# Patient Record
Sex: MALE | Race: WHITE | NOT HISPANIC OR LATINO | Employment: FULL TIME | URBAN - METROPOLITAN AREA
[De-identification: names, ages, dates, MRNs, and addresses within clinical notes are randomized per-mention and may not be internally consistent; named-entity substitution may affect disease eponyms.]

---

## 2017-01-20 ENCOUNTER — RECORDS - HEALTHEAST (OUTPATIENT)
Dept: LAB | Facility: CLINIC | Age: 62
End: 2017-01-20

## 2017-01-20 LAB
CHOLEST SERPL-MCNC: 112 MG/DL
FASTING STATUS PATIENT QL REPORTED: YES
HDLC SERPL-MCNC: 34 MG/DL
LDLC SERPL CALC-MCNC: 69 MG/DL
TRIGL SERPL-MCNC: 45 MG/DL

## 2017-03-30 ENCOUNTER — AMBULATORY - HEALTHEAST (OUTPATIENT)
Dept: CARDIOLOGY | Facility: CLINIC | Age: 62
End: 2017-03-30

## 2017-04-03 ENCOUNTER — OFFICE VISIT - HEALTHEAST (OUTPATIENT)
Dept: CARDIOLOGY | Facility: CLINIC | Age: 62
End: 2017-04-03

## 2017-04-03 DIAGNOSIS — I10 ESSENTIAL HYPERTENSION WITH GOAL BLOOD PRESSURE LESS THAN 130/80: ICD-10-CM

## 2017-04-03 ASSESSMENT — MIFFLIN-ST. JEOR: SCORE: 1765.56

## 2017-07-17 ENCOUNTER — RECORDS - HEALTHEAST (OUTPATIENT)
Dept: LAB | Facility: CLINIC | Age: 62
End: 2017-07-17

## 2017-07-17 LAB — PSA SERPL-MCNC: 0.7 NG/ML (ref 0–4.5)

## 2017-08-29 ENCOUNTER — AMBULATORY - HEALTHEAST (OUTPATIENT)
Dept: CARDIOLOGY | Facility: CLINIC | Age: 62
End: 2017-08-29

## 2017-08-29 ENCOUNTER — RECORDS - HEALTHEAST (OUTPATIENT)
Dept: ADMINISTRATIVE | Facility: OTHER | Age: 62
End: 2017-08-29

## 2017-08-30 ENCOUNTER — OFFICE VISIT - HEALTHEAST (OUTPATIENT)
Dept: CARDIOLOGY | Facility: CLINIC | Age: 62
End: 2017-08-30

## 2017-08-30 DIAGNOSIS — I48.0 PAF (PAROXYSMAL ATRIAL FIBRILLATION) (H): ICD-10-CM

## 2017-08-30 ASSESSMENT — MIFFLIN-ST. JEOR: SCORE: 1756.48

## 2017-08-31 LAB
ATRIAL RATE - MUSE: 46 BPM
DIASTOLIC BLOOD PRESSURE - MUSE: NORMAL MMHG
INTERPRETATION ECG - MUSE: NORMAL
P AXIS - MUSE: 33 DEGREES
PR INTERVAL - MUSE: 190 MS
QRS DURATION - MUSE: 114 MS
QT - MUSE: 496 MS
QTC - MUSE: 434 MS
R AXIS - MUSE: 48 DEGREES
SYSTOLIC BLOOD PRESSURE - MUSE: NORMAL MMHG
T AXIS - MUSE: 58 DEGREES
VENTRICULAR RATE- MUSE: 46 BPM

## 2017-09-01 ENCOUNTER — COMMUNICATION - HEALTHEAST (OUTPATIENT)
Dept: CARDIOLOGY | Facility: CLINIC | Age: 62
End: 2017-09-01

## 2017-09-01 DIAGNOSIS — I10 HTN (HYPERTENSION): ICD-10-CM

## 2017-09-01 RX ORDER — ALISKIREN HEMIFUMARATE 150 MG/1
TABLET, FILM COATED ORAL
Qty: 90 TABLET | Refills: 3 | Status: SHIPPED | OUTPATIENT
Start: 2017-09-01

## 2017-09-18 ENCOUNTER — HOSPITAL ENCOUNTER (OUTPATIENT)
Dept: CARDIOLOGY | Facility: CLINIC | Age: 62
Discharge: HOME | End: 2017-09-18
Attending: INTERNAL MEDICINE

## 2017-09-18 DIAGNOSIS — I48.0 PAF (PAROXYSMAL ATRIAL FIBRILLATION) (H): ICD-10-CM

## 2017-10-11 ENCOUNTER — COMMUNICATION - HEALTHEAST (OUTPATIENT)
Dept: CARDIOLOGY | Facility: CLINIC | Age: 62
End: 2017-10-11

## 2017-10-11 DIAGNOSIS — I48.0 PAF (PAROXYSMAL ATRIAL FIBRILLATION) (H): ICD-10-CM

## 2017-10-13 ENCOUNTER — OFFICE VISIT - HEALTHEAST (OUTPATIENT)
Dept: CARDIOLOGY | Facility: CLINIC | Age: 62
End: 2017-10-13

## 2017-10-13 DIAGNOSIS — I10 ESSENTIAL HYPERTENSION: ICD-10-CM

## 2017-10-13 DIAGNOSIS — I48.0 PAF (PAROXYSMAL ATRIAL FIBRILLATION) (H): ICD-10-CM

## 2017-10-13 DIAGNOSIS — I25.10 CORONARY ARTERY DISEASE: ICD-10-CM

## 2017-10-13 ASSESSMENT — MIFFLIN-ST. JEOR: SCORE: 1738.34

## 2018-01-29 ENCOUNTER — RECORDS - HEALTHEAST (OUTPATIENT)
Dept: LAB | Facility: CLINIC | Age: 63
End: 2018-01-29

## 2018-01-29 LAB
ANION GAP SERPL CALCULATED.3IONS-SCNC: 10 MMOL/L (ref 5–18)
BUN SERPL-MCNC: 17 MG/DL (ref 8–22)
CALCIUM SERPL-MCNC: 9.2 MG/DL (ref 8.5–10.5)
CHLORIDE BLD-SCNC: 104 MMOL/L (ref 98–107)
CHOLEST SERPL-MCNC: 160 MG/DL
CO2 SERPL-SCNC: 26 MMOL/L (ref 22–31)
CREAT SERPL-MCNC: 0.82 MG/DL (ref 0.7–1.3)
CREAT UR-MCNC: 64.4 MG/DL
FASTING STATUS PATIENT QL REPORTED: NORMAL
GFR SERPL CREATININE-BSD FRML MDRD: >60 ML/MIN/1.73M2
GLUCOSE BLD-MCNC: 128 MG/DL (ref 70–125)
HDLC SERPL-MCNC: 44 MG/DL
LDLC SERPL CALC-MCNC: 94 MG/DL
MICROALBUMIN UR-MCNC: <0.5 MG/DL (ref 0–1.99)
MICROALBUMIN/CREAT UR: NORMAL MG/G
POTASSIUM BLD-SCNC: 3.8 MMOL/L (ref 3.5–5)
SODIUM SERPL-SCNC: 140 MMOL/L (ref 136–145)
TRIGL SERPL-MCNC: 112 MG/DL

## 2018-07-23 ENCOUNTER — RECORDS - HEALTHEAST (OUTPATIENT)
Dept: LAB | Facility: CLINIC | Age: 63
End: 2018-07-23

## 2018-07-23 LAB
ALBUMIN SERPL-MCNC: 3.8 G/DL (ref 3.5–5)
ALP SERPL-CCNC: 99 U/L (ref 45–120)
ALT SERPL W P-5'-P-CCNC: 36 U/L (ref 0–45)
ANION GAP SERPL CALCULATED.3IONS-SCNC: 10 MMOL/L (ref 5–18)
AST SERPL W P-5'-P-CCNC: 21 U/L (ref 0–40)
BILIRUB SERPL-MCNC: 0.9 MG/DL (ref 0–1)
BUN SERPL-MCNC: 21 MG/DL (ref 8–22)
CALCIUM SERPL-MCNC: 9.1 MG/DL (ref 8.5–10.5)
CHLORIDE BLD-SCNC: 104 MMOL/L (ref 98–107)
CHOLEST SERPL-MCNC: 142 MG/DL
CO2 SERPL-SCNC: 25 MMOL/L (ref 22–31)
CREAT SERPL-MCNC: 0.85 MG/DL (ref 0.7–1.3)
FASTING STATUS PATIENT QL REPORTED: ABNORMAL
GFR SERPL CREATININE-BSD FRML MDRD: >60 ML/MIN/1.73M2
GLUCOSE BLD-MCNC: 140 MG/DL (ref 70–125)
HDLC SERPL-MCNC: 37 MG/DL
LDLC SERPL CALC-MCNC: 72 MG/DL
POTASSIUM BLD-SCNC: 3.8 MMOL/L (ref 3.5–5)
PROT SERPL-MCNC: 6.4 G/DL (ref 6–8)
PSA SERPL-MCNC: 0.6 NG/ML (ref 0–4.5)
SODIUM SERPL-SCNC: 139 MMOL/L (ref 136–145)
TRIGL SERPL-MCNC: 164 MG/DL

## 2018-08-15 ENCOUNTER — COMMUNICATION - HEALTHEAST (OUTPATIENT)
Dept: ADMINISTRATIVE | Facility: CLINIC | Age: 63
End: 2018-08-15

## 2018-09-30 ENCOUNTER — COMMUNICATION - HEALTHEAST (OUTPATIENT)
Dept: CARDIOLOGY | Facility: CLINIC | Age: 63
End: 2018-09-30

## 2018-09-30 DIAGNOSIS — I48.0 PAF (PAROXYSMAL ATRIAL FIBRILLATION) (H): ICD-10-CM

## 2019-01-14 ENCOUNTER — RECORDS - HEALTHEAST (OUTPATIENT)
Dept: LAB | Facility: CLINIC | Age: 64
End: 2019-01-14

## 2019-01-14 LAB
ANION GAP SERPL CALCULATED.3IONS-SCNC: 10 MMOL/L (ref 5–18)
BUN SERPL-MCNC: 17 MG/DL (ref 8–22)
CALCIUM SERPL-MCNC: 9.1 MG/DL (ref 8.5–10.5)
CHLORIDE BLD-SCNC: 105 MMOL/L (ref 98–107)
CO2 SERPL-SCNC: 26 MMOL/L (ref 22–31)
CREAT SERPL-MCNC: 0.81 MG/DL (ref 0.7–1.3)
GFR SERPL CREATININE-BSD FRML MDRD: >60 ML/MIN/1.73M2
GLUCOSE BLD-MCNC: 145 MG/DL (ref 70–125)
POTASSIUM BLD-SCNC: 4 MMOL/L (ref 3.5–5)
SODIUM SERPL-SCNC: 141 MMOL/L (ref 136–145)

## 2019-07-26 ENCOUNTER — RECORDS - HEALTHEAST (OUTPATIENT)
Dept: ADMINISTRATIVE | Facility: OTHER | Age: 64
End: 2019-07-26

## 2019-07-29 ENCOUNTER — TRANSFERRED RECORDS (OUTPATIENT)
Dept: HEALTH INFORMATION MANAGEMENT | Facility: CLINIC | Age: 64
End: 2019-07-29

## 2019-07-29 ENCOUNTER — RECORDS - HEALTHEAST (OUTPATIENT)
Dept: LAB | Facility: CLINIC | Age: 64
End: 2019-07-29

## 2019-07-29 ENCOUNTER — OFFICE VISIT - HEALTHEAST (OUTPATIENT)
Dept: CARDIOLOGY | Facility: CLINIC | Age: 64
End: 2019-07-29

## 2019-07-29 DIAGNOSIS — I10 ESSENTIAL HYPERTENSION: ICD-10-CM

## 2019-07-29 DIAGNOSIS — R01.1 HEART MURMUR: ICD-10-CM

## 2019-07-29 DIAGNOSIS — I25.10 CORONARY ARTERY DISEASE: ICD-10-CM

## 2019-07-29 DIAGNOSIS — I48.0 PAF (PAROXYSMAL ATRIAL FIBRILLATION) (H): ICD-10-CM

## 2019-07-29 LAB
ALBUMIN SERPL-MCNC: 4 G/DL (ref 3.5–5)
ALP SERPL-CCNC: 116 U/L (ref 45–120)
ALT SERPL W P-5'-P-CCNC: 35 U/L (ref 0–45)
ANION GAP SERPL CALCULATED.3IONS-SCNC: 8 MMOL/L (ref 5–18)
AST SERPL W P-5'-P-CCNC: 21 U/L (ref 0–40)
ATRIAL RATE - MUSE: 58 BPM
BILIRUB SERPL-MCNC: 0.8 MG/DL (ref 0–1)
BUN SERPL-MCNC: 16 MG/DL (ref 8–22)
CALCIUM SERPL-MCNC: 9.6 MG/DL (ref 8.5–10.5)
CHLORIDE BLD-SCNC: 104 MMOL/L (ref 98–107)
CHOLEST SERPL-MCNC: 158 MG/DL
CO2 SERPL-SCNC: 28 MMOL/L (ref 22–31)
CREAT SERPL-MCNC: 0.84 MG/DL (ref 0.7–1.3)
DIASTOLIC BLOOD PRESSURE - MUSE: NORMAL MMHG
FASTING STATUS PATIENT QL REPORTED: NORMAL
GFR SERPL CREATININE-BSD FRML MDRD: >60 ML/MIN/1.73M2
GLUCOSE BLD-MCNC: 134 MG/DL (ref 70–125)
HDLC SERPL-MCNC: 42 MG/DL
INTERPRETATION ECG - MUSE: NORMAL
LDLC SERPL CALC-MCNC: 99 MG/DL
P AXIS - MUSE: -27 DEGREES
POTASSIUM BLD-SCNC: 4.2 MMOL/L (ref 3.5–5)
PR INTERVAL - MUSE: 158 MS
PROT SERPL-MCNC: 6.6 G/DL (ref 6–8)
QRS DURATION - MUSE: 100 MS
QT - MUSE: 444 MS
QTC - MUSE: 435 MS
R AXIS - MUSE: 47 DEGREES
SODIUM SERPL-SCNC: 140 MMOL/L (ref 136–145)
SYSTOLIC BLOOD PRESSURE - MUSE: NORMAL MMHG
T AXIS - MUSE: 59 DEGREES
TRIGL SERPL-MCNC: 85 MG/DL
VENTRICULAR RATE- MUSE: 58 BPM

## 2019-07-29 ASSESSMENT — MIFFLIN-ST. JEOR: SCORE: 1824.52

## 2019-08-06 ENCOUNTER — HOSPITAL ENCOUNTER (OUTPATIENT)
Dept: CARDIOLOGY | Facility: CLINIC | Age: 64
Discharge: HOME | End: 2019-08-06
Attending: INTERNAL MEDICINE

## 2019-08-06 LAB
AORTIC ROOT: 3.1 CM
AORTIC VALVE MEAN VELOCITY: 102 CM/S
AV DIMENSIONLESS INDEX VTI: 0.7
AV MEAN GRADIENT: 5 MMHG
AV PEAK GRADIENT: 8.3 MMHG
AV VALVE AREA: 2.6 CM2
AV VELOCITY RATIO: 0.8
BSA FOR ECHO PROCEDURE: 2.27 M2
CV BLOOD PRESSURE: ABNORMAL MMHG
CV ECHO HEIGHT: 70 IN
CV ECHO WEIGHT: 230 LBS
DOP CALC AO PEAK VEL: 144 CM/S
DOP CALC AO VTI: 37.1 CM
DOP CALC LVOT AREA: 3.46 CM2
DOP CALC LVOT DIAMETER: 2.1 CM
DOP CALC LVOT PEAK VEL: 114 CM/S
DOP CALC LVOT STROKE VOLUME: 94.9 CM3
DOP CALCLVOT PEAK VEL VTI: 27.4 CM
ECHO EJECTION FRACTION ESTIMATED: 55 %
EJECTION FRACTION: 51 % (ref 55–75)
FRACTIONAL SHORTENING: 25.8 % (ref 28–44)
INTERVENTRICULAR SEPTUM IN END DIASTOLE: 1.22 CM (ref 0.6–1)
IVS/PW RATIO: 1.1
LA AREA 1: 20.5 CM2
LA AREA 2: 19 CM2
LEFT ATRIUM LENGTH: 5.37 CM
LEFT ATRIUM SIZE: 4.6 CM
LEFT ATRIUM TO AORTIC ROOT RATIO: 1.48 NO UNITS
LEFT ATRIUM VOLUME INDEX: 27.2 ML/M2
LEFT ATRIUM VOLUME: 61.7 ML
LEFT VENTRICLE CARDIAC INDEX: 1.9 L/MIN/M2
LEFT VENTRICLE CARDIAC OUTPUT: 4.4 L/MIN
LEFT VENTRICLE DIASTOLIC VOLUME INDEX: 64.8 CM3/M2 (ref 34–74)
LEFT VENTRICLE DIASTOLIC VOLUME: 147 CM3 (ref 62–150)
LEFT VENTRICLE HEART RATE: 46 BPM
LEFT VENTRICLE MASS INDEX: 86.3 G/M2
LEFT VENTRICLE SYSTOLIC VOLUME INDEX: 31.7 CM3/M2 (ref 11–31)
LEFT VENTRICLE SYSTOLIC VOLUME: 72 CM3 (ref 21–61)
LEFT VENTRICULAR INTERNAL DIMENSION IN DIASTOLE: 4.61 CM (ref 4.2–5.8)
LEFT VENTRICULAR INTERNAL DIMENSION IN SYSTOLE: 3.42 CM (ref 2.5–4)
LEFT VENTRICULAR MASS: 196 G
LEFT VENTRICULAR OUTFLOW TRACT MEAN GRADIENT: 2 MMHG
LEFT VENTRICULAR OUTFLOW TRACT MEAN VELOCITY: 68.9 CM/S
LEFT VENTRICULAR OUTFLOW TRACT PEAK GRADIENT: 5 MMHG
LEFT VENTRICULAR POSTERIOR WALL IN END DIASTOLE: 1.1 CM (ref 0.6–1)
LV STROKE VOLUME INDEX: 41.8 ML/M2
MITRAL VALVE E/A RATIO: 1.5
MV AVERAGE E/E' RATIO: 11 CM/S
MV DECELERATION TIME: 241 MS
MV E'TISSUE VEL-LAT: 9.85 CM/S
MV E'TISSUE VEL-MED: 6.53 CM/S
MV LATERAL E/E' RATIO: 9.1
MV MEDIAL E/E' RATIO: 13.8
MV PEAK A VELOCITY: 59.7 CM/S
MV PEAK E VELOCITY: 90.1 CM/S
NUC REST DIASTOLIC VOLUME INDEX: 3680 LBS
NUC REST SYSTOLIC VOLUME INDEX: 70 IN
TRICUSPID VALVE ANULAR PLANE SYSTOLIC EXCURSION: 2.2 CM

## 2019-08-06 ASSESSMENT — MIFFLIN-ST. JEOR: SCORE: 1824.52

## 2019-09-25 ENCOUNTER — COMMUNICATION - HEALTHEAST (OUTPATIENT)
Dept: CARDIOLOGY | Facility: CLINIC | Age: 64
End: 2019-09-25

## 2019-09-25 DIAGNOSIS — I48.0 PAF (PAROXYSMAL ATRIAL FIBRILLATION) (H): ICD-10-CM

## 2019-12-12 ENCOUNTER — THERAPY VISIT (OUTPATIENT)
Dept: PHYSICAL THERAPY | Facility: CLINIC | Age: 64
End: 2019-12-12
Payer: OTHER MISCELLANEOUS

## 2019-12-12 DIAGNOSIS — M54.6 CHRONIC BILATERAL THORACIC BACK PAIN: ICD-10-CM

## 2019-12-12 DIAGNOSIS — M25.511 CHRONIC RIGHT SHOULDER PAIN: ICD-10-CM

## 2019-12-12 DIAGNOSIS — G89.29 CHRONIC RIGHT SHOULDER PAIN: ICD-10-CM

## 2019-12-12 DIAGNOSIS — G89.29 CHRONIC BILATERAL THORACIC BACK PAIN: ICD-10-CM

## 2019-12-12 PROCEDURE — 97110 THERAPEUTIC EXERCISES: CPT | Mod: GP | Performed by: PHYSICAL THERAPIST

## 2019-12-12 PROCEDURE — 97161 PT EVAL LOW COMPLEX 20 MIN: CPT | Mod: GP | Performed by: PHYSICAL THERAPIST

## 2019-12-12 NOTE — PROGRESS NOTES
[facility] Initial Evaluation  Subjective:  The history is provided by the patient. No  was used.   Reggie Roper being seen for right shoulder and upper back pain.   Date of Onset: Right shoulder began February 2018, upper back April 2019. 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  and reported as 4/10 (0-4/10) on pain scale. General health as reported by patient is good. Pertinent medical history includes:  High blood pressure and overweight.    Surgeries include:  Heart surgery. Other surgery history details: angiogram.  Current medications:  High blood pressure medication.   Primary job tasks include:  Lifting/carrying, prolonged standing and pushing/pulling.  Pain is described as aching  Pain is worse during the day. Since onset symptoms are gradually improving. Special tests:  MRI. Previous treatment includes physical therapy. There was moderate improvement following previous treatment.   Patient is warehouse operations. Restrictions include:  Working in normal job with restrictions (lifting < 30#).    Barriers include:  None as reported by patient.  Red flags:  None as reported by patient.  Type of problem:  Right shoulder   Condition occurred with:  Other. This is a chronic condition    Patient reports pain:  Anterior.   Symptoms are exacerbated by using arm overhead and other (pushing/pulling) and relieved by rest.    Type of problem:  Thoracic spine   Condition occurred with:  Contact with object. This is a chronic condition    Patient reports pain:  Upper thoracic, thoracic right side and thoracic left side. Radiates to:  None.  Symptoms are exacerbated by other (pushing/pulling, reaching) and relieved by rest.                      Objective:  System              Cervical/Thoracic Evaluation    AROM:  AROM Cervical:    Flexion:             100%  Extension:       50%  Rotation:         Left: 80% (R shoulder pain)     Right: 90% (R shoulder pain)  Side Bend:      Left: 75%     Right:  75%  AROM Thoracic:    Flexion:               100%  Extension:          50%  Rotation:            Left: 100%     Right: 100%      Headaches: none                         Shoulder Evaluation:  ROM:  AROM:    Flexion:  Left:  141    Right:  122    Abduction:  Left: 140   Right:  140      External Rotation:  Left:  T10    Right:  T12                      Strength:    Flexion: Right: 5/5     Pain:     Abduction:  Right: 5-/5     Pain:    Internal Rotation:  Right: 5/5     Pain:  External Rotation:   Right:5-/5     Pain:              Special Tests:      Right shoulder positive for the following special tests:Impingement (+ Neer's, H-K and cross impingement)  Right shoulder negative for the following special tests:Rotator cuff tear  Palpation:  normal      Mobility Tests:    Glenohumeral anterior right:  Normal  Glenohumeral posterior right:  Normal                                             General     ROS    Assessment/Plan:    Patient is a 64 year old male with thoracic and right side shoulder complaints.  He has a significant loss of cervical and thoracic extension. He has poor posture with significant FH and rounded shoulders. He has mild cuff weakness and positive impingement testing. He may benefit from progressive stretching and strengthening as well as posture correction.  Patient has the following significant findings with corresponding treatment plan.                Diagnosis 1:  Thoracic pain  Pain -  self management, education, directional preference exercise and home program  Decreased ROM/flexibility - manual therapy and therapeutic exercise  Decreased function - therapeutic activities  Impaired posture - neuro re-education  Diagnosis 2:  R shoulder pain   Pain -  self management, education, directional preference exercise and home program  Decreased  ROM/flexibility - manual therapy and therapeutic exercise  Decreased strength - therapeutic exercise and therapeutic activities  Decreased function - therapeutic activities  Impaired posture - neuro re-education    Cumulative Therapy Evaluation is: Low complexity.    Previous and current functional limitations:  (See Goal Flow Sheet for this information)    Short term and Long term goals: (See Goal Flow Sheet for this information)     Communication ability:  Patient appears to be able to clearly communicate and understand verbal and written communication and follow directions correctly.  Treatment Explanation - The following has been discussed with the patient:   RX ordered/plan of care  Anticipated outcomes  Possible risks and side effects  This patient would benefit from PT intervention to resume normal activities.   Rehab potential is good.    Frequency:  1 X week, once daily  Duration:  for 6 weeks  Discharge Plan:  Achieve all LTG.  Independent in home treatment program.  Reach maximal therapeutic benefit.    Please refer to the daily flowsheet for treatment today, total treatment time and time spent performing 1:1 timed codes.

## 2019-12-19 ENCOUNTER — THERAPY VISIT (OUTPATIENT)
Dept: PHYSICAL THERAPY | Facility: CLINIC | Age: 64
End: 2019-12-19
Payer: OTHER MISCELLANEOUS

## 2019-12-19 DIAGNOSIS — M25.511 CHRONIC RIGHT SHOULDER PAIN: ICD-10-CM

## 2019-12-19 DIAGNOSIS — G89.29 CHRONIC RIGHT SHOULDER PAIN: ICD-10-CM

## 2019-12-19 DIAGNOSIS — M54.6 CHRONIC BILATERAL THORACIC BACK PAIN: ICD-10-CM

## 2019-12-19 DIAGNOSIS — G89.29 CHRONIC BILATERAL THORACIC BACK PAIN: ICD-10-CM

## 2019-12-19 PROCEDURE — 97112 NEUROMUSCULAR REEDUCATION: CPT | Mod: GP | Performed by: PHYSICAL THERAPIST

## 2019-12-19 PROCEDURE — 97110 THERAPEUTIC EXERCISES: CPT | Mod: GP | Performed by: PHYSICAL THERAPIST

## 2020-01-02 ENCOUNTER — THERAPY VISIT (OUTPATIENT)
Dept: PHYSICAL THERAPY | Facility: CLINIC | Age: 65
End: 2020-01-02
Payer: OTHER MISCELLANEOUS

## 2020-01-02 DIAGNOSIS — G89.29 CHRONIC BILATERAL THORACIC BACK PAIN: ICD-10-CM

## 2020-01-02 DIAGNOSIS — G89.29 CHRONIC RIGHT SHOULDER PAIN: ICD-10-CM

## 2020-01-02 DIAGNOSIS — M25.511 CHRONIC RIGHT SHOULDER PAIN: ICD-10-CM

## 2020-01-02 DIAGNOSIS — M54.6 CHRONIC BILATERAL THORACIC BACK PAIN: ICD-10-CM

## 2020-01-02 PROCEDURE — 97110 THERAPEUTIC EXERCISES: CPT | Mod: GP | Performed by: PHYSICAL THERAPIST

## 2020-01-02 PROCEDURE — 97112 NEUROMUSCULAR REEDUCATION: CPT | Mod: GP | Performed by: PHYSICAL THERAPIST

## 2020-01-06 ENCOUNTER — RECORDS - HEALTHEAST (OUTPATIENT)
Dept: LAB | Facility: CLINIC | Age: 65
End: 2020-01-06

## 2020-01-06 LAB
ANION GAP SERPL CALCULATED.3IONS-SCNC: 10 MMOL/L (ref 5–18)
BUN SERPL-MCNC: 15 MG/DL (ref 8–22)
CALCIUM SERPL-MCNC: 9.1 MG/DL (ref 8.5–10.5)
CHLORIDE BLD-SCNC: 103 MMOL/L (ref 98–107)
CO2 SERPL-SCNC: 26 MMOL/L (ref 22–31)
CREAT SERPL-MCNC: 0.86 MG/DL (ref 0.7–1.3)
GFR SERPL CREATININE-BSD FRML MDRD: >60 ML/MIN/1.73M2
GLUCOSE BLD-MCNC: 187 MG/DL (ref 70–125)
POTASSIUM BLD-SCNC: 4 MMOL/L (ref 3.5–5)
SODIUM SERPL-SCNC: 139 MMOL/L (ref 136–145)

## 2020-01-09 ENCOUNTER — THERAPY VISIT (OUTPATIENT)
Dept: PHYSICAL THERAPY | Facility: CLINIC | Age: 65
End: 2020-01-09
Payer: OTHER MISCELLANEOUS

## 2020-01-09 DIAGNOSIS — G89.29 CHRONIC RIGHT SHOULDER PAIN: ICD-10-CM

## 2020-01-09 DIAGNOSIS — G89.29 CHRONIC BILATERAL THORACIC BACK PAIN: ICD-10-CM

## 2020-01-09 DIAGNOSIS — M25.511 CHRONIC RIGHT SHOULDER PAIN: ICD-10-CM

## 2020-01-09 DIAGNOSIS — M54.6 CHRONIC BILATERAL THORACIC BACK PAIN: ICD-10-CM

## 2020-01-09 PROCEDURE — 97110 THERAPEUTIC EXERCISES: CPT | Mod: GP | Performed by: PHYSICAL THERAPIST

## 2020-01-09 PROCEDURE — 97112 NEUROMUSCULAR REEDUCATION: CPT | Mod: GP | Performed by: PHYSICAL THERAPIST

## 2020-01-16 ENCOUNTER — THERAPY VISIT (OUTPATIENT)
Dept: PHYSICAL THERAPY | Facility: CLINIC | Age: 65
End: 2020-01-16
Payer: OTHER MISCELLANEOUS

## 2020-01-16 DIAGNOSIS — G89.29 CHRONIC BILATERAL THORACIC BACK PAIN: ICD-10-CM

## 2020-01-16 DIAGNOSIS — M54.6 CHRONIC BILATERAL THORACIC BACK PAIN: ICD-10-CM

## 2020-01-16 DIAGNOSIS — M25.511 CHRONIC RIGHT SHOULDER PAIN: ICD-10-CM

## 2020-01-16 DIAGNOSIS — G89.29 CHRONIC RIGHT SHOULDER PAIN: ICD-10-CM

## 2020-01-16 PROCEDURE — 97112 NEUROMUSCULAR REEDUCATION: CPT | Mod: GP | Performed by: PHYSICAL THERAPIST

## 2020-01-16 PROCEDURE — 97110 THERAPEUTIC EXERCISES: CPT | Mod: GP | Performed by: PHYSICAL THERAPIST

## 2020-01-30 ENCOUNTER — THERAPY VISIT (OUTPATIENT)
Dept: PHYSICAL THERAPY | Facility: CLINIC | Age: 65
End: 2020-01-30
Payer: OTHER MISCELLANEOUS

## 2020-01-30 DIAGNOSIS — M54.6 CHRONIC BILATERAL THORACIC BACK PAIN: ICD-10-CM

## 2020-01-30 DIAGNOSIS — G89.29 CHRONIC RIGHT SHOULDER PAIN: ICD-10-CM

## 2020-01-30 DIAGNOSIS — M25.511 CHRONIC RIGHT SHOULDER PAIN: ICD-10-CM

## 2020-01-30 DIAGNOSIS — G89.29 CHRONIC BILATERAL THORACIC BACK PAIN: ICD-10-CM

## 2020-01-30 PROCEDURE — 97112 NEUROMUSCULAR REEDUCATION: CPT | Mod: GP | Performed by: PHYSICAL THERAPIST

## 2020-01-30 PROCEDURE — 97110 THERAPEUTIC EXERCISES: CPT | Mod: GP | Performed by: PHYSICAL THERAPIST

## 2020-01-30 NOTE — PROGRESS NOTES
Subjective:  HPI                    Objective:  System    Physical Exam    General     ROS    Assessment/Plan:    DISCHARGE REPORT    Progress reporting period is from 12-12-19 to 1-30-20.       SUBJECTIVE  Daily activities go fine at home. CC is shoulder pain in the workplace with pulling heavy doors up/down or lifting heavy boxes overhead. More aware of posutre. He has follow up with MD next week.     Current Pain level: 0/10.      Initial Pain level: 4/10.   Changes in function:  Yes (See Goal flowsheet attached for changes in current functional level)  Adverse reaction to treatment or activity: None    OBJECTIVE  Changes noted in objective findings:  Yes,   CAROM: flex=100%, ext=75% (50% at IE), LR=90% (80% at IE), RR=90%, RSB and ARF=535% (75% at IE.)   Thoracic ext improved from 50% to 75%.   R shoulder AROM is WNL bilaterally (improved from R flex=122 at IE).   MMT: 5/5 throughout, improved from abd and ER=5-/5 at IE.)   (-) impingement testing today (+ at IE).    Poor to fair posture: FH, rounded shoulders, large protruding abdomen.      ASSESSMENT/PLAN  Updated problem list and treatment plan: Diagnosis 1:  Thoracic pain  Decreased ROM/flexibility - home program  Decreased function - home program  Impaired posture - home program  Diagnosis 2:  R shoulder pain   Pain -  home program  Decreased function - home program  Impaired posture - home program  STG/LTGs have been met or progress has been made towards goals:  Yes (See Goal flow sheet completed today.)  Assessment of Progress: The patient's condition is improving.  Patient is meeting short term goals and is progressing towards long term goals.  Self Management Plans:  Patient has been instructed in a home treatment program.  Patient  has been instructed in self management of symptoms.  I have re-evaluated this patient and find that the nature, scope, duration and intensity of the therapy is appropriate for the medical condition of the patient.  Reggie  continues to require the following intervention to meet STG and LTG's:  PT intervention is no longer required to meet STG/LTG.    Recommendations:  This patient is ready to be discharged from therapy and continue their home treatment program.  He has follow up with MD next week.    Please refer to the daily flowsheet for treatment today, total treatment time and time spent performing 1:1 timed codes.

## 2020-04-06 ENCOUNTER — RECORDS - HEALTHEAST (OUTPATIENT)
Dept: LAB | Facility: CLINIC | Age: 65
End: 2020-04-06

## 2020-04-06 LAB
CHOLEST SERPL-MCNC: 133 MG/DL
FASTING STATUS PATIENT QL REPORTED: ABNORMAL
HDLC SERPL-MCNC: 37 MG/DL
LDLC SERPL CALC-MCNC: 69 MG/DL
TRIGL SERPL-MCNC: 135 MG/DL

## 2020-04-07 ENCOUNTER — TELEPHONE (OUTPATIENT)
Dept: PHYSICAL THERAPY | Facility: CLINIC | Age: 65
End: 2020-04-07

## 2020-05-17 ENCOUNTER — COMMUNICATION - HEALTHEAST (OUTPATIENT)
Dept: CARDIOLOGY | Facility: CLINIC | Age: 65
End: 2020-05-17

## 2020-05-17 DIAGNOSIS — I48.0 PAF (PAROXYSMAL ATRIAL FIBRILLATION) (H): ICD-10-CM

## 2020-08-10 ENCOUNTER — RECORDS - HEALTHEAST (OUTPATIENT)
Dept: LAB | Facility: CLINIC | Age: 65
End: 2020-08-10

## 2020-08-10 LAB
ANION GAP SERPL CALCULATED.3IONS-SCNC: 8 MMOL/L (ref 5–18)
BUN SERPL-MCNC: 13 MG/DL (ref 8–22)
CALCIUM SERPL-MCNC: 9 MG/DL (ref 8.5–10.5)
CHLORIDE BLD-SCNC: 103 MMOL/L (ref 98–107)
CO2 SERPL-SCNC: 27 MMOL/L (ref 22–31)
CREAT SERPL-MCNC: 0.93 MG/DL (ref 0.7–1.3)
GFR SERPL CREATININE-BSD FRML MDRD: >60 ML/MIN/1.73M2
GLUCOSE BLD-MCNC: 178 MG/DL (ref 70–125)
POTASSIUM BLD-SCNC: 4 MMOL/L (ref 3.5–5)
SODIUM SERPL-SCNC: 138 MMOL/L (ref 136–145)

## 2020-08-25 ENCOUNTER — COMMUNICATION - HEALTHEAST (OUTPATIENT)
Dept: CARDIOLOGY | Facility: CLINIC | Age: 65
End: 2020-08-25

## 2020-08-25 DIAGNOSIS — I48.0 PAF (PAROXYSMAL ATRIAL FIBRILLATION) (H): ICD-10-CM

## 2020-08-27 ENCOUNTER — RECORDS - HEALTHEAST (OUTPATIENT)
Dept: LAB | Facility: CLINIC | Age: 65
End: 2020-08-27

## 2020-08-27 LAB — PSA SERPL-MCNC: 0.5 NG/ML (ref 0–4.5)

## 2020-09-30 ENCOUNTER — RECORDS - HEALTHEAST (OUTPATIENT)
Dept: ADMINISTRATIVE | Facility: OTHER | Age: 65
End: 2020-09-30

## 2020-09-30 ENCOUNTER — AMBULATORY - HEALTHEAST (OUTPATIENT)
Dept: CARDIOLOGY | Facility: CLINIC | Age: 65
End: 2020-09-30

## 2020-10-02 ENCOUNTER — OFFICE VISIT - HEALTHEAST (OUTPATIENT)
Dept: CARDIOLOGY | Facility: CLINIC | Age: 65
End: 2020-10-02

## 2020-10-02 DIAGNOSIS — I10 ESSENTIAL HYPERTENSION: ICD-10-CM

## 2020-10-02 DIAGNOSIS — I25.10 CORONARY ARTERY DISEASE INVOLVING NATIVE CORONARY ARTERY OF NATIVE HEART WITHOUT ANGINA PECTORIS: ICD-10-CM

## 2020-10-02 DIAGNOSIS — I48.0 PAF (PAROXYSMAL ATRIAL FIBRILLATION) (H): ICD-10-CM

## 2020-10-02 ASSESSMENT — MIFFLIN-ST. JEOR: SCORE: 1838.13

## 2021-01-14 ENCOUNTER — RECORDS - HEALTHEAST (OUTPATIENT)
Dept: LAB | Facility: CLINIC | Age: 66
End: 2021-01-14

## 2021-01-14 LAB
ANION GAP SERPL CALCULATED.3IONS-SCNC: 7 MMOL/L (ref 5–18)
BUN SERPL-MCNC: 17 MG/DL (ref 8–22)
CALCIUM SERPL-MCNC: 9.1 MG/DL (ref 8.5–10.5)
CHLORIDE BLD-SCNC: 102 MMOL/L (ref 98–107)
CO2 SERPL-SCNC: 30 MMOL/L (ref 22–31)
CREAT SERPL-MCNC: 0.9 MG/DL (ref 0.7–1.3)
GFR SERPL CREATININE-BSD FRML MDRD: >60 ML/MIN/1.73M2
GLUCOSE BLD-MCNC: 111 MG/DL (ref 70–125)
POTASSIUM BLD-SCNC: 3.9 MMOL/L (ref 3.5–5)
SODIUM SERPL-SCNC: 139 MMOL/L (ref 136–145)

## 2021-03-08 ENCOUNTER — RECORDS - HEALTHEAST (OUTPATIENT)
Dept: LAB | Facility: CLINIC | Age: 66
End: 2021-03-08

## 2021-03-08 LAB
CHOLEST SERPL-MCNC: 128 MG/DL
FASTING STATUS PATIENT QL REPORTED: ABNORMAL
HDLC SERPL-MCNC: 34 MG/DL
LDLC SERPL CALC-MCNC: 59 MG/DL
TRIGL SERPL-MCNC: 173 MG/DL

## 2021-05-24 ENCOUNTER — RECORDS - HEALTHEAST (OUTPATIENT)
Dept: ADMINISTRATIVE | Facility: CLINIC | Age: 66
End: 2021-05-24

## 2021-05-30 VITALS — WEIGHT: 217 LBS | BODY MASS INDEX: 31.07 KG/M2 | HEIGHT: 70 IN

## 2021-05-30 NOTE — PATIENT INSTRUCTIONS - HE
Reggie Roper,    It was a pleasure to see you today at the [facility].     My recommendations after this visit include:    1.  We will check a EKG today and give you the original to take for your preop exam.    2.  I would like to get an echocardiogram to evaluate the heart murmur that you have in the mild edema.  We can get this before your surgery.    3.  We will call you with the results of the echocardiogram.    4.  Please stop your Xarelto 4 days prior to surgery and resume it as soon as your surgeon will allow after surgery.        Nino Salazar

## 2021-05-31 VITALS — BODY MASS INDEX: 30.78 KG/M2 | HEIGHT: 70 IN | WEIGHT: 215 LBS

## 2021-05-31 VITALS — WEIGHT: 211 LBS | HEIGHT: 70 IN | BODY MASS INDEX: 30.21 KG/M2

## 2021-06-03 VITALS — BODY MASS INDEX: 32.93 KG/M2 | WEIGHT: 230 LBS | HEIGHT: 70 IN

## 2021-06-03 VITALS — HEIGHT: 70 IN | BODY MASS INDEX: 32.93 KG/M2 | WEIGHT: 230 LBS

## 2021-06-04 VITALS
BODY MASS INDEX: 33.36 KG/M2 | SYSTOLIC BLOOD PRESSURE: 134 MMHG | DIASTOLIC BLOOD PRESSURE: 70 MMHG | RESPIRATION RATE: 14 BRPM | HEIGHT: 70 IN | WEIGHT: 233 LBS | HEART RATE: 68 BPM

## 2021-06-09 NOTE — PROGRESS NOTES
[facility] Note    Assessment / Plan:    1.  Hypertension.  controlled on medical regimen.  Consider a trial of stopping amlodipine  2.  Obesity.  Considerably improved with diet and exercise regimen.  Congratulated and encouraged to continue his efforts  3.  Diabetes mellitus, noncompliant with metformin.  Encouraged to contact his primary care provider    Follow-up here as needed    ______________________________________________________________________    Subjective:    I had the opportunity to see Reggie Roper at the [facility]. Reggie Roper is a 62 y.o. male with a history of mild coronary artery disease at angiography 6/2014.  He also has a history of hypertension and dyslipidemia.  He had obesity and suspected sleep apnea when I last saw him 2 years ago.  In the interim he was diagnosed with early diabetes.  This encouraged him to seek diet and exercise help.  Since that time he has lost 25 pounds and is currently exercising 3-4 days a week on an elliptical .  He feels that his energy level is significantly improved.  Has not experienced any further chest pains.  His alertness is also improved.  He never had a sleep study performed.    He stopped taking his metformin on his own since he last saw his primary care provider.  He felt she did not mention that diabetes continued to be an issue.  He was encouraged to contact his primary care provider before adjusting any of his medications.  He does not follow his blood sugars at home.    ______________________________________________________________________    Problem List:  Patient Active Problem List   Diagnosis     Chest Pain     Dyslipidemia     Atherosclerosis     Coronary artery disease     Essential hypertension     Medical History:  Past Medical History:   Diagnosis Date     Abnormal stress test      Chest pain     Midsternal Pain     Hyperlipidemia      Hypertension      Shortness of breath on exertion       Surgical History:  Past Surgical History:   Procedure Laterality Date     CATARACT EXTRACTION       Social History:  Social History     Social History     Marital status:      Spouse name: N/A     Number of children: N/A     Years of education: N/A     Occupational History     Not on file.     Social History Main Topics     Smoking status: Former Smoker     Types: Cigars     Smokeless tobacco: Never Used     Alcohol use Not on file     Drug use: No     Sexual activity: Not on file     Other Topics Concern     Not on file     Social History Narrative     Sleep History:  Awakens refreshed during the daytime.  Works nights in a [employer]  Exercise History:  Exercises on [employer] 3-4 days a week    Review of Systems:   General: Weight Loss  Eyes: WNL  Ears/Nose/Throat: WNL  Lungs: WNL  Heart: WNL  Stomach: WNL  Bladder: WNL  Muscle/Joints: WNL  Skin: WNL  Nervous System: WNL  Mental Health: WNL    Blood: WNL      Family History:  Family History   Problem Relation Age of Onset     Heart disease Father      CABG Father 78         Allergies:  No Known Allergies  Medications:  Current Outpatient Prescriptions   Medication Sig Dispense Refill     aliskiren (TEKTURNA) 150 MG tablet Take 150 mg by mouth daily.       amLODIPine (NORVASC) 5 MG tablet Take 5 mg by mouth daily.       ASPIRIN ORAL Take 81 mg by mouth daily.       atorvastatin (LIPITOR) 40 MG tablet TAKE 1 TABLET BY MOUTH AT BEDTIME. 90 tablet 0     atorvastatin (LIPITOR) 40 MG tablet TAKE 1 TABLET BY MOUTH AT BEDTIME. 90 tablet 0     BYSTOLIC 10 mg tablet TAKE 1 TABLET BY MOUTH DAILY FOR BLOOD PRESSURE 90 tablet 0     metFORMIN (GLUCOPHAGE) 500 MG tablet 500 mg 2 (two) times a day with meals.        nitroglycerin (NITROSTAT) 0.4 MG SL tablet Place 1 tablet (0.4 mg total) under the tongue every 5 (five) minutes as needed for chest pain. 25 tablet 10     omeprazole (PRILOSEC) 20 MG capsule Take 20 mg by mouth daily.       No current  "facility-administered medications for this visit.        Objective:   Wt Readings from Last 3 Encounters:   04/03/17 217 lb (98.4 kg)   07/16/15 (!) 242 lb (109.8 kg)   12/19/14 (!) 241 lb (109.3 kg)     Vital signs:  /66 (Patient Site: Left Arm, Patient Position: Sitting, Cuff Size: Adult Large)  Pulse 60  Resp 18  Ht 5' 10\" (1.778 m)  Wt 217 lb (98.4 kg)  BMI 31.14 kg/m2      Physical Exam:    GENERAL APPEARANCE: Alert, cooperative and in no acute distress. Tired appearing   HEENT: Conjunctivae slightly injected bilaterally. Periorbital puffiness is noted No scleral icterus. No Xanthelasma. Oral mucous membranes pink and moist.   NECK: No JVD. No Hepatojugular reflux. Thyroid not enlarged.   CHEST: clear to auscultation   CARDIOVASCULAR: Regular S1, S2 without murmur. Brachial, radial and posterior tibial pulses are intact and symetric. No carotid bruits noted.   ABDOMEN: Obese. Nontender. BS+. No bruits.   EXTREMITIES: Trace bilateral ankle edema.      Lab Results:  LIPIDS:  Lab Results   Component Value Date    CHOL 112 01/20/2017    CHOL 146 01/11/2016    CHOL 115 01/06/2015     Lab Results   Component Value Date    HDL 34 (L) 01/20/2017    HDL 37 (L) 01/11/2016    HDL 34 (L) 01/06/2015     Lab Results   Component Value Date    LDLCALC 69 01/20/2017    LDLCALC 84 01/11/2016    LDLCALC 69 01/06/2015     Lab Results   Component Value Date    TRIG 45 01/20/2017    TRIG 123 01/11/2016    TRIG 62 01/06/2015         LYUDMILA HERNANDEZ MD [facility]  [phone]    This note created using Dragon voice recognition software.  Sound alike errors may have escaped editing.          "

## 2021-06-13 NOTE — PROGRESS NOTES
[facility]--Electrophysiology    Assessment/Plan:      Problem List Items Addressed This Visit     Coronary artery disease    Essential hypertension    PAF (paroxysmal atrial fibrillation) - Primary        1.  Paroxysmal atrial fibrillation: No symptomatology or evidence of recurrence since his first episode in August.  Had a lengthy discussion of the physiology and natural progression of atrial fibrillation as well as treatment options of antiarrhythmic medications versus pulmonary vein isolation ablation.  As he has a tendency for sinus bradycardia, he may be a better candidate for ablative therapies, but recommend watchful waiting at this point as he has had only one known episode.  He was instructed to check his pulse daily and keep a log of A. fib episodes including frequency, duration, and associated symptoms.  He was given information to review at home on atrial fibrillation and ablative therapies.    He was reassured that atrial fibrillation is not life-threatening, but does carry an increased risk for stroke.  He has a [id number] score of 2 for hypertension and CAD, this may increase to 3 if he truly has diabetes.  We discussed the risk for stroke versus the risk for bleed and current guidelines for OAC therapy.  Continue Xarelto 20 mg daily with his evening meal.    2.  Hypertension: Initially hypertensive, but within target upon recheck.  Blood pressures consistently at target, so no medication adjustments recommended.    3.  Nonobstructive coronary artery disease: No symptoms indicative of myocardial ischemia.  Aspirin was discontinued when he was started on Xarelto.    Follow-up in 6 months, or sooner if he has an increase in frequency or duration of atrial fibrillation.    Subjective:      Reggie Roper, a very pleasant 62-year-old gentleman, comes in today for EP evaluation of atrial fibrillation.  His only known episode of atrial fibrillation occurred on 8/26/2017.  He had been working at  [place] [employer] when he developed a sluggish, lightheaded sensation.  He was found to be in atrial fibrillation with controlled ventricular response.  He was started on diltiazem in addition to his chronic Bystolic which he takes for hypertension.  Diltiazem was subsequently discontinued due to profound bradycardia and he was started on Xarelto 20 mg daily for stroke prophylaxis.  Event monitor showed no atrial fibrillation or flutter nor significant bradycardia.  He also has known history of nonobstructive coronary artery disease.  In the past year, he was diagnosed with \"prediabetes\", but since that time has changed his diet, began exercising, and has been losing weight.    Al states that he has been feeling well and has had no recurrent symptoms of atrial fibrillation.  He denies chest discomfort, palpitations, shortness of breath, paroxysmal nocturnal dyspnea, orthopnea, lightheadedness, dizziness, pre-syncope, or syncope.  He works nights but consistently eats between 5 and 6 PM; he has been taking his Xarelto with his evening meal and reports no missed doses or bleeding issues.    Medical, surgical, family, social history, and medications were reviewed and updated as necessary.    Patient Active Problem List   Diagnosis     Chest Pain     Dyslipidemia     Atherosclerosis     Coronary artery disease     Essential hypertension     PAF (paroxysmal atrial fibrillation)       Past Medical History:   Diagnosis Date     Abnormal stress test      Chest pain     Midsternal Pain     Diabetes mellitus      Hyperlipidemia      Hypertension      Shortness of breath on exertion        Past Surgical History:   Procedure Laterality Date     CATARACT EXTRACTION         No Known Allergies    Current Outpatient Prescriptions   Medication Sig Dispense Refill     atorvastatin (LIPITOR) 40 MG tablet TAKE 1 TABLET BY MOUTH AT BEDTIME. 90 tablet 0     BYSTOLIC 10 mg tablet TAKE 1 TABLET BY MOUTH DAILY FOR BLOOD PRESSURE 90 tablet 0 " "    omeprazole (PRILOSEC) 20 MG capsule Take 20 mg by mouth daily.       rivaroxaban 20 mg Tab Take 1 tablet (20 mg total) by mouth daily. 90 tablet 2     TEKTURNA 150 mg tablet TAKE 1 TABLET BY MOUTH ONCE DAILY FOR BLOOD PRESSURE 90 tablet 3     metFORMIN (GLUCOPHAGE) 500 MG tablet 500 mg 2 (two) times a day with meals.        No current facility-administered medications for this visit.        Family History   Problem Relation Age of Onset     Heart disease Father      CABG Father 78       Social History   Substance Use Topics     Smoking status: Former Smoker     Types: Cigars     Smokeless tobacco: Never Used     Alcohol use Yes      Comment: occasional       Review of Systems:   General: WNL  Eyes: WNL  Ears/Nose/Throat: WNL  Lungs: WNL  Heart: WNL  Stomach: WNL  Bladder: WNL  Muscle/Joints: WNL  Skin: WNL  Nervous System: WNL  Mental Health: WNL     Blood: WNL      Objective:    /74  Pulse (!) 48  Resp 16  Ht 5' 10\" (1.778 m)  Wt 211 lb (95.7 kg)  BMI 30.28 kg/m2    Physical Exam  General Appearance:  Alert, well-appearing, in no acute distress.     HEENT:  Atraumatic, normocephalic; no scleral icterus, EOM intact, PERRL; the mucous membranes were pink and moist.   Chest: Chest symmetric, spine straight.     Lungs:   Respirations unlabored; the lungs are clear to auscultation.   Cardiovascular:   Auscultation reveals normal first and second heart sounds with no murmurs, rubs, or gallops.  Regular rate and rhythm.   Radial and posterior tibial pulses are intact.    Abdomen:  Soft, nontender, nondistended, bowel sounds present.     Extremities: No cyanosis, clubbing, or edema.   Musculoskeletal:  Moves all extremities.     Skin: No xanthelasma.   Neurologic: Mood and affect are appropriate. Oriented to person, place, time, and situation.       Cardiographics (personally reviewed)  EC2017 shows sinus bradycardia 46 bpm, QT/QTc interval measures 496/434 ms  EC2017 shows atrial " fibrillation with controlled ventricular response at 85 bpm    One lead patch monitor worn 9/18/2017 through 10/1/2017 shows sinus rhythm with rates 50-70 bpm, occasional PACs.  No atrial fibrillation, atrial flutter, tachyarrhythmias, or bradycardia arrhythmias.    Coronary angiogram done 6/23/2014  Calculated LV ejection fraction is 68 %.    Arterial access obtained via right femoral artery.   Right dominant coronary arterial system.   The LAD coronary artery exhibits mild disease in the proximal   - mid segment.   The LCX coronary artery exhibits mild disease in the proximal   segment.   The right coronary artery exhibits mild disease in the mid   segment.       Lab Review   Lab Results   Component Value Date    CREATININE 1.02 08/26/2017    BUN 16 08/26/2017     08/26/2017    K 4.1 08/26/2017     (H) 08/26/2017    CO2 27 08/26/2017     Lab Results   Component Value Date    TSH 1.68 08/26/2017   No results found for: HGBA1C  Lab Results   Component Value Date    WBC 7.2 08/26/2017    HGB 16.7 08/26/2017    HCT 47.7 08/26/2017    MCV 94 08/26/2017     08/26/2017         Greater than 60 minutes were spent face to face in this visit with more than 50% of the time discussing diagnoses as listed above, counseling, and coordination of care.      Maddi Shannon, [facility], Electrophysiology  [phone]    This note has been dictated using voice recognition software. Any grammatical, typographical, or context distortions are unintentional and inherent to the software.

## 2021-06-25 NOTE — PROGRESS NOTES
Progress Notes by Mauro Richards MD (Ted) at 8/30/2017  3:50 PM     Author: Mauro Richards MD (Ted) Service: -- Author Type: Physician    Filed: 8/30/2017  4:56 PM Encounter Date: 8/30/2017 Status: Signed    : Mauro Richards MD (Ted) (Physician)           Click to link to [facility] HEART [facility] NOTE    Thank you, Dr. Sanderson, for asking the [facility] to evaluate Mr. Reggie Roper.      Assessment/Recommendations   Assessment:    Paroxysmal atrial fibrillation, resolved  Sinus bradycardia secondary to diltiazem and bystolic  Hypertension, controlled  Nonobstructive coronary artery disease  Obesity    Plan:  He needs cardioembolic event prophylaxis.  We will give him prescription for Xarelto 20 mg a day.  He was instructed to stop taking aspirin.  He should stop taking diltiazem because of profound bradycardia.  14 day event monitor to assess chronotropic competence and recurrence of atrial fibrillation.  Follow-up with EP nurse practitioner in 3-4 weeks.       History of Present Illness    Mr. Reggie Roper is a 62 y.o. male comes into rapid access clinic because of new onset atrial fibrillation.  He was working at [employer] when he developed lightheaded sensation.  He went to see a nurse.  He was found to have irregular heart rhythm.  He was sent to the emergency room.  ER evaluation confirmed presence of atrial fibrillation.  Ventricular response rate was only 84 beats per minute.  The patient did not have distinct sensation of heart racing.  He was given diltiazem and sent home.  He has not had recurrence lightheadedness.  He has noticed that his pulse rate has been only in 40s.  He denies syncope or near syncope.  He does not have a history of known atrial fibrillation.  He has been anti-taking antihypertensive medication for a number of years.  He reports recent substantial intentional weight loss.  Has a history of mild  nonobstructive coronary artery disease confirmed by coronary angiogram in 2014.  He denies history of valvular heart disease  ECG: Personally reviewed.  First EKG shows atrial fibrillation with rate of 84 bpm.  EKG performed today shows sinus bradycardia rate of 46 bpm       Physical Examination Review of Systems   Vitals:    08/30/17 1552   BP: 124/74   Pulse: (!) 44   Resp: 16   SpO2: 97%     Body mass index is 30.85 kg/(m^2).  Wt Readings from Last 3 Encounters:   08/30/17 215 lb (97.5 kg)   08/26/17 222 lb (100.7 kg)   04/03/17 217 lb (98.4 kg)     General Appearance:   Alert, cooperative, no distress, appears stated age   Head/ENT: Normocephalic, without obvious abnormality. Membranes moist      EYES:  no scleral icterus, normal conjunctivae   Neck: Supple, symmetrical, trachea midline, no adenopathy, thyroid: not enlarged, symmetric, no carotid bruit or JVD   Chest/Lungs:   Lungs are clear to auscultation, respirations unlabored. No tenderness or deformity    Cardiovascular:   Regular rhythm, S1, S2 normal, no murmur, rub or gallop.   Abdomen:  Soft, non-tender, bowel sounds active all four quadrants,  no masses, no organomegaly   Extremities: no cyanosis or clubbing. No edema   Skin: Skin color, texture, turgor normal, no rashes or lesions.    Psychiatric: Normal affect, calm   Neurologic: Alert and oriented x 3, moving all four extremities.     General: Weight Loss  Eyes: WNL  Ears/Nose/Throat: WNL  Lungs: WNL  Heart: Irregular Heartbeat  Stomach: WNL  Bladder: WNL  Muscle/Joints: WNL  Skin: WNL  Nervous System: WNL  Mental Health: WNL     Blood: WNL     Medical History  Surgical History Family History Social History   Past Medical History:   Diagnosis Date   ? Abnormal stress test    ? Chest pain     Midsternal Pain   ? Diabetes mellitus    ? Hyperlipidemia    ? Hypertension    ? Shortness of breath on exertion     Past Surgical History:   Procedure Laterality Date   ? CATARACT EXTRACTION      Family  History   Problem Relation Age of Onset   ? Heart disease Father    ? CABG Father 78    Social History     Social History   ? Marital status:      Spouse name: N/A   ? Number of children: N/A   ? Years of education: N/A     Occupational History   ? Not on file.     Social History Main Topics   ? Smoking status: Former Smoker     Types: Cigars   ? Smokeless tobacco: Never Used   ? Alcohol use Yes      Comment: occasional   ? Drug use: No   ? Sexual activity: Not on file     Other Topics Concern   ? Not on file     Social History Narrative          Medications  Allergies   Current Outpatient Prescriptions   Medication Sig Dispense Refill   ? aliskiren (TEKTURNA) 150 MG tablet Take 150 mg by mouth daily.     ? amLODIPine (NORVASC) 5 MG tablet Take 5 mg by mouth daily.     ? atorvastatin (LIPITOR) 40 MG tablet TAKE 1 TABLET BY MOUTH AT BEDTIME. 90 tablet 0   ? BYSTOLIC 10 mg tablet TAKE 1 TABLET BY MOUTH DAILY FOR BLOOD PRESSURE 90 tablet 0   ? omeprazole (PRILOSEC) 20 MG capsule Take 20 mg by mouth daily.     ? metFORMIN (GLUCOPHAGE) 500 MG tablet 500 mg 2 (two) times a day with meals.      ? rivaroxaban 20 mg Tab Take 1 tablet (20 mg total) by mouth daily. 30 tablet 12     No current facility-administered medications for this visit.       No Known Allergies      Lab Results    Chemistry/lipid CBC Cardiac Enzymes/BNP/TSH/INR   Lab Results   Component Value Date    CHOL 112 01/20/2017    HDL 34 (L) 01/20/2017    LDLCALC 69 01/20/2017    TRIG 45 01/20/2017    CREATININE 1.02 08/26/2017    BUN 16 08/26/2017    K 4.1 08/26/2017     08/26/2017     (H) 08/26/2017    CO2 27 08/26/2017    Lab Results   Component Value Date    WBC 7.2 08/26/2017    HGB 16.7 08/26/2017    HCT 47.7 08/26/2017    MCV 94 08/26/2017     08/26/2017    Lab Results   Component Value Date    TROPONINI <0.01 08/26/2017    TSH 1.68 08/26/2017    INR 1.13 (H) 08/26/2017

## 2021-06-27 NOTE — PROGRESS NOTES
Progress Notes by Nino Salazar MD at 7/29/2019  2:20 PM     Author: Nino Salazar MD Service: -- Author Type: Physician    Filed: 7/29/2019  2:59 PM Encounter Date: 7/29/2019 Status: Signed    : Nino Salazar MD (Physician)           Click to link to [facility] Heart [facility] HEART CARE NOTE    Thank you, Dr. Sanderson, for asking us to see Reggie Roper at the [facility].      Assessment/Recommendations   Patient with known history of atrial fibrillation without any symptoms to suggest atrial fibrillation in the last couple of years.  He does not have evidence of angina or congestive heart failure so his surgical risk should be minimal.  I told him he could discontinue his Xarelto 4 days prior to surgery and resume it as soon as the surgeon will allow after surgery.    Because of his murmur and mild edema I think would be reasonable to do an echocardiogram and will schedule that in the near future.  We will call him with results and any further recommendations.    We will schedule him for a one-year follow-up but would be happy to see him sooner if questions or problems arise.    Thank you for allowing us to participate in his care.         History of Present Illness    Mr. Reggie Roper is a 64 y.o. male with known history of atrial fibrillation and mild coronary artery disease.  He has had at least one episode of atrial fibrillation documented in the past.  This was several years ago.  Approximately 5 years ago he also had a coronary angiography which showed mild plaque.    He has been feeling well of late.  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  He has not had any recent palpitations, syncope or near syncopal episode and denies peripheral edema.  He has no orthopnea or paroxysmal nocturnal dyspnea.    ECG: Personally reviewed.  Sinus rhythm without acute ST-T wave changes.     Physical  Examination Review of Systems   Vitals:    07/29/19 1430   BP: 122/78   Pulse: 68   Resp: 16     Body mass index is 33 kg/m .  Wt Readings from Last 3 Encounters:   07/29/19 (!) 230 lb (104.3 kg)   10/13/17 211 lb (95.7 kg)   08/30/17 215 lb (97.5 kg)     General Appearance:   Alert, cooperative and in no acute distress.   ENT/Mouth: Oral mucuos membranes pink and moist .      EYES:  No scleral icterus. No Xanthelasma.    Neck: JVP normal. No Hepatojugular reflux. Thyroid not visualized   Chest/Lungs:   Lungs are clear to auscultation, equal chest wall expansion    Cardiovascular:   S1, S2 with 2/6 systolic murmur , no clicks or rubs. Brachial, radial and posterior tibial pulses are intact and symetric. No carotid bruits noted   Abdomen:  Nontender. BS+.  Rounded      Extremities: No cyanosis, clubbing and very mild bilateral pretibial edema   Skin: no xanthelasma, warm.    Psych: Appropriate affect.   Neurologic: normal gait, normal  bilateral, no tremors        General: WNL  Eyes: WNL  Ears/Nose/Throat: WNL  Lungs: WNL  Heart: WNL  Stomach: WNL  Bladder: WNL  Muscle/Joints: WNL  Skin: WNL  Nervous System: WNL  Mental Health: WNL     Blood: WNL     Medical History  Surgical History Family History Social History   Past Medical History:   Diagnosis Date   ? Abnormal stress test    ? Chest pain     Midsternal Pain   ? Diabetes mellitus (H)    ? Hyperlipidemia    ? Hypertension    ? Shortness of breath on exertion     Past Surgical History:   Procedure Laterality Date   ? CATARACT EXTRACTION      Family History   Problem Relation Age of Onset   ? Heart disease Father    ? CABG Father 78    Social History     Socioeconomic History   ? Marital status:      Spouse name: Not on file   ? Number of children: Not on file   ? Years of education: Not on file   ? Highest education level: Not on file   Occupational History   ? Not on file   Social Needs   ? Financial resource strain: Not on file   ? Food insecurity:      Worry: Not on file     Inability: Not on file   ? Transportation needs:     Medical: Not on file     Non-medical: Not on file   Tobacco Use   ? Smoking status: Former Smoker     Types: Cigars   ? Smokeless tobacco: Never Used   Substance and Sexual Activity   ? Alcohol use: Yes     Comment: occasional   ? Drug use: No   ? Sexual activity: Not on file   Lifestyle   ? Physical activity:     Days per week: Not on file     Minutes per session: Not on file   ? Stress: Not on file   Relationships   ? Social connections:     Talks on phone: Not on file     Gets together: Not on file     Attends Holiness service: Not on file     Active member of club or organization: Not on file     Attends meetings of clubs or organizations: Not on file     Relationship status: Not on file   ? Intimate partner violence:     Fear of current or ex partner: Not on file     Emotionally abused: Not on file     Physically abused: Not on file     Forced sexual activity: Not on file   Other Topics Concern   ? Not on file   Social History Narrative   ? Not on file          Medications  Allergies   Current Outpatient Medications   Medication Sig Dispense Refill   ? atorvastatin (LIPITOR) 40 MG tablet TAKE 1 TABLET BY MOUTH AT BEDTIME. 90 tablet 0   ? BYSTOLIC 10 mg tablet TAKE 1 TABLET BY MOUTH DAILY FOR BLOOD PRESSURE 90 tablet 0   ? metFORMIN (GLUCOPHAGE) 500 MG tablet 500 mg 2 (two) times a day with meals.      ? omeprazole (PRILOSEC) 20 MG capsule Take 20 mg by mouth daily.     ? TEKTURNA 150 mg tablet TAKE 1 TABLET BY MOUTH ONCE DAILY FOR BLOOD PRESSURE 90 tablet 3   ? XARELTO 20 mg Tab TAKE 1 TABLET DAILY 90 tablet 0     No current facility-administered medications for this visit.       No Known Allergies      Lab Results    Chemistry/lipid CBC Cardiac Enzymes/BNP/TSH/INR   Lab Results   Component Value Date    CHOL 158 07/29/2019    HDL 42 07/29/2019    LDLCALC 99 07/29/2019    TRIG 85 07/29/2019    CREATININE 0.84 07/29/2019    BUN 16  07/29/2019    K 4.2 07/29/2019     07/29/2019     07/29/2019    CO2 28 07/29/2019    Lab Results   Component Value Date    WBC 7.2 08/26/2017    HGB 16.7 08/26/2017    HCT 47.7 08/26/2017    MCV 94 08/26/2017     08/26/2017    Lab Results   Component Value Date    TROPONINI <0.01 08/26/2017    TSH 1.68 08/26/2017    INR 1.13 (H) 08/26/2017

## 2021-06-29 NOTE — PROGRESS NOTES
Progress Notes by Nino Salazar MD at 10/2/2020  3:50 PM     Author: Nino Salazar MD Service: -- Author Type: Physician    Filed: 10/7/2020  6:09 PM Encounter Date: 10/2/2020 Status: Signed    : Nino Salazar MD (Physician)               Assessment/Recommendations   Patient with known history of paroxysmal atrial fibrillation but no episodes for quite some time and certainly not this year.  He has been feeling well.  His blood pressure is at goal, his LDL cholesterol was just under 70 in April of this year and he has known mild coronary artery disease by previous angiography.    I have encouraged him to maintain an active lifestyle and to exercise with the equivalent of brisk walking at least 30 minutes at least 5 times each week.    I have not made any changes in his medications.  We will plan on seeing him back in 1 year, but of course would be happy to see him sooner if questions or problems arise.    Thank you for allowing us to participate in his care.       History of Present Illness/Subjective    Mr. Reggie Roper is a 65 y.o. male with known history of mild nonobstructive coronary artery disease by previous angiography, hypertension, as well as paroxysmal atrial fibrillation.  He has been feeling well.  He has not had any chest discomfort.  He denies orthopnea, paroxysmal nocturnal dyspnea, peripheral edema, syncope or near syncopal episodes.  LDL cholesterol in April of this year was 69.           Physical Examination Review of Systems   Vitals:    10/02/20 1548   BP: 134/70   Pulse: 68   Resp: 14     Body mass index is 33.43 kg/m .  Wt Readings from Last 3 Encounters:   10/02/20 (!) 233 lb (105.7 kg)   08/06/19 (!) 230 lb (104.3 kg)   07/29/19 (!) 230 lb (104.3 kg)     General Appearance:   Alert, cooperative and in no acute distress.   ENT/Mouth: Oral mucuos membranes pink and moist .      EYES:  no scleral icterus, normal conjunctivae   Neck: JVP normal. No Hepatojugular reflux.  Thyroid not visualized.   Chest/Lungs:   Lungs are clear to auscultation, equal chest wall expansion.   Cardiovascular:   S1, S2 without murmur ,clicks or rubs. Brachial, radial and posterior tibial pulses are intact and symetric. No carotid bruits noted   Abdomen:  Nontender. BS   Extremities: No cyanosis, clubbing or edema   Skin: no xanthelasma, warm.    Neurologic: normal arm motion bilateral, no tremors     Psychiatric: Appropriate affect.      General: WNL  Eyes: WNL  Ears/Nose/Throat: WNL  Lungs: WNL  Heart: WNL  Stomach: WNL  Bladder: WNL  Muscle/Joints: WNL  Skin: WNL  Nervous System: WNL  Mental Health: WNL     Blood: WNL       Medical History  Surgical History Family History Social History   Past Medical History:   Diagnosis Date   ? Abnormal stress test    ? Chest pain     Midsternal Pain   ? Diabetes mellitus (H)    ? Hyperlipidemia    ? Hypertension    ? Shortness of breath on exertion     Past Surgical History:   Procedure Laterality Date   ? CATARACT EXTRACTION      Family History   Problem Relation Age of Onset   ? Heart disease Father    ? CABG Father 78    Social History     Socioeconomic History   ? Marital status:      Spouse name: Not on file   ? Number of children: Not on file   ? Years of education: Not on file   ? Highest education level: Not on file   Occupational History   ? Not on file   Social Needs   ? Financial resource strain: Not on file   ? Food insecurity     Worry: Not on file     Inability: Not on file   ? Transportation needs     Medical: Not on file     Non-medical: Not on file   Tobacco Use   ? Smoking status: Former Smoker     Types: Cigars   ? Smokeless tobacco: Never Used   Substance and Sexual Activity   ? Alcohol use: Yes     Comment: occasional   ? Drug use: No   ? Sexual activity: Not on file   Lifestyle   ? Physical activity     Days per week: Not on file     Minutes per session: Not on file   ? Stress: Not on file   Relationships   ? Social connections      Talks on phone: Not on file     Gets together: Not on file     Attends Faith service: Not on file     Active member of club or organization: Not on file     Attends meetings of clubs or organizations: Not on file     Relationship status: Not on file   ? Intimate partner violence     Fear of current or ex partner: Not on file     Emotionally abused: Not on file     Physically abused: Not on file     Forced sexual activity: Not on file   Other Topics Concern   ? Not on file   Social History Narrative   ? Not on file          Medications  Allergies   Current Outpatient Medications   Medication Sig Dispense Refill   ? atorvastatin (LIPITOR) 40 MG tablet TAKE 1 TABLET BY MOUTH AT BEDTIME. 90 tablet 0   ? BYSTOLIC 10 mg tablet TAKE 1 TABLET BY MOUTH DAILY FOR BLOOD PRESSURE 90 tablet 0   ? metFORMIN (GLUCOPHAGE) 500 MG tablet 2,000 mg daily.      ? omeprazole (PRILOSEC) 20 MG capsule Take 20 mg by mouth daily.     ? rivaroxaban ANTICOAGULANT (XARELTO) 20 mg tablet Take 1 tablet (20 mg total) by mouth daily with supper. 90 tablet 0   ? TEKTURNA 150 mg tablet TAKE 1 TABLET BY MOUTH ONCE DAILY FOR BLOOD PRESSURE 90 tablet 3     No current facility-administered medications for this visit.     No Known Allergies      Lab Results    Chemistry/lipid CBC Cardiac Enzymes/BNP/TSH/INR   Lab Results   Component Value Date    CHOL 133 04/06/2020    HDL 37 (L) 04/06/2020    LDLCALC 69 04/06/2020    TRIG 135 04/06/2020    CREATININE 0.93 08/10/2020    BUN 13 08/10/2020    K 4.0 08/10/2020     08/10/2020     08/10/2020    CO2 27 08/10/2020    Lab Results   Component Value Date    WBC 7.2 08/26/2017    HGB 16.7 08/26/2017    HCT 47.7 08/26/2017    MCV 94 08/26/2017     08/26/2017    Lab Results   Component Value Date    TROPONINI <0.01 08/26/2017    TSH 1.68 08/26/2017    INR 1.13 (H) 08/26/2017

## 2021-08-09 DIAGNOSIS — I48.0 PAF (PAROXYSMAL ATRIAL FIBRILLATION) (H): ICD-10-CM

## 2021-09-15 ENCOUNTER — LAB REQUISITION (OUTPATIENT)
Dept: LAB | Facility: CLINIC | Age: 66
End: 2021-09-15
Payer: COMMERCIAL

## 2021-09-15 DIAGNOSIS — Z12.5 ENCOUNTER FOR SCREENING FOR MALIGNANT NEOPLASM OF PROSTATE: ICD-10-CM

## 2021-09-15 DIAGNOSIS — E11.9 TYPE 2 DIABETES MELLITUS WITHOUT COMPLICATIONS (H): ICD-10-CM

## 2021-09-15 LAB
ANION GAP SERPL CALCULATED.3IONS-SCNC: 13 MMOL/L (ref 5–18)
BUN SERPL-MCNC: 20 MG/DL (ref 8–22)
CALCIUM SERPL-MCNC: 9.5 MG/DL (ref 8.5–10.5)
CHLORIDE BLD-SCNC: 101 MMOL/L (ref 98–107)
CO2 SERPL-SCNC: 25 MMOL/L (ref 22–31)
CREAT SERPL-MCNC: 0.97 MG/DL (ref 0.7–1.3)
GFR SERPL CREATININE-BSD FRML MDRD: 80 ML/MIN/1.73M2
GLUCOSE BLD-MCNC: 125 MG/DL (ref 70–125)
POTASSIUM BLD-SCNC: 4.1 MMOL/L (ref 3.5–5)
PSA SERPL-MCNC: 0.53 UG/L (ref 0–4.5)
SODIUM SERPL-SCNC: 139 MMOL/L (ref 136–145)

## 2021-09-15 PROCEDURE — 80048 BASIC METABOLIC PNL TOTAL CA: CPT | Mod: ORL | Performed by: FAMILY MEDICINE

## 2021-09-15 PROCEDURE — 36415 COLL VENOUS BLD VENIPUNCTURE: CPT | Performed by: FAMILY MEDICINE

## 2021-09-15 PROCEDURE — G0103 PSA SCREENING: HCPCS | Mod: ORL | Performed by: FAMILY MEDICINE

## 2021-10-07 ENCOUNTER — LAB REQUISITION (OUTPATIENT)
Dept: LAB | Facility: CLINIC | Age: 66
End: 2021-10-07
Payer: COMMERCIAL

## 2021-10-07 ENCOUNTER — HOSPITAL ENCOUNTER (INPATIENT)
Facility: CLINIC | Age: 66
LOS: 3 days | Discharge: HOME | DRG: 039 | End: 2021-10-10
Attending: EMERGENCY MEDICINE | Admitting: INTERNAL MEDICINE
Payer: COMMERCIAL

## 2021-10-07 ENCOUNTER — APPOINTMENT (OUTPATIENT)
Dept: MRI IMAGING | Facility: CLINIC | Age: 66
DRG: 039 | End: 2021-10-07
Attending: EMERGENCY MEDICINE
Payer: COMMERCIAL

## 2021-10-07 DIAGNOSIS — R53.1 WEAKNESS: ICD-10-CM

## 2021-10-07 DIAGNOSIS — R05.9 COUGH, UNSPECIFIED: ICD-10-CM

## 2021-10-07 DIAGNOSIS — I65.22 CAROTID ARTERY STENOSIS, SYMPTOMATIC, LEFT: Primary | ICD-10-CM

## 2021-10-07 DIAGNOSIS — I63.9 ACUTE EMBOLIC STROKE (H): ICD-10-CM

## 2021-10-07 LAB
ALBUMIN SERPL-MCNC: 4.1 G/DL (ref 3.4–5)
ALBUMIN UR-MCNC: NEGATIVE MG/DL
ALP SERPL-CCNC: 120 U/L (ref 40–150)
ALT SERPL W P-5'-P-CCNC: 64 U/L (ref 0–70)
ANION GAP SERPL CALCULATED.3IONS-SCNC: 6 MMOL/L (ref 3–14)
APPEARANCE UR: CLEAR
AST SERPL W P-5'-P-CCNC: 29 U/L (ref 0–45)
BASOPHILS # BLD AUTO: 0.1 10E3/UL (ref 0–0.2)
BASOPHILS NFR BLD AUTO: 1 %
BILIRUB SERPL-MCNC: 0.9 MG/DL (ref 0.2–1.3)
BILIRUB UR QL STRIP: NEGATIVE
BUN SERPL-MCNC: 16 MG/DL (ref 7–30)
CALCIUM SERPL-MCNC: 8.9 MG/DL (ref 8.5–10.1)
CHLORIDE BLD-SCNC: 105 MMOL/L (ref 94–109)
CO2 SERPL-SCNC: 28 MMOL/L (ref 20–32)
COLOR UR AUTO: YELLOW
CREAT SERPL-MCNC: 0.96 MG/DL (ref 0.66–1.25)
EOSINOPHIL # BLD AUTO: 0.2 10E3/UL (ref 0–0.7)
EOSINOPHIL NFR BLD AUTO: 2 %
ERYTHROCYTE [DISTWIDTH] IN BLOOD BY AUTOMATED COUNT: 12.4 % (ref 10–15)
ETHANOL SERPL-MCNC: <0.01 G/DL
GFR SERPL CREATININE-BSD FRML MDRD: 81 ML/MIN/1.73M2
GLUCOSE BLD-MCNC: 162 MG/DL (ref 70–99)
GLUCOSE UR STRIP-MCNC: >=1000 MG/DL
HCT VFR BLD AUTO: 49.3 % (ref 40–53)
HGB BLD-MCNC: 16.2 G/DL (ref 13.3–17.7)
HGB UR QL STRIP: NEGATIVE
HOLD SPECIMEN: NORMAL
IMM GRANULOCYTES # BLD: 0 10E3/UL
IMM GRANULOCYTES NFR BLD: 0 %
KETONES UR STRIP-MCNC: NEGATIVE MG/DL
LEUKOCYTE ESTERASE UR QL STRIP: NEGATIVE
LYMPHOCYTES # BLD AUTO: 2 10E3/UL (ref 0.8–5.3)
LYMPHOCYTES NFR BLD AUTO: 26 %
MCH RBC QN AUTO: 31.1 PG (ref 26.5–33)
MCHC RBC AUTO-ENTMCNC: 32.9 G/DL (ref 31.5–36.5)
MCV RBC AUTO: 95 FL (ref 78–100)
MONOCYTES # BLD AUTO: 0.8 10E3/UL (ref 0–1.3)
MONOCYTES NFR BLD AUTO: 11 %
MUCOUS THREADS #/AREA URNS LPF: PRESENT /LPF
NEUTROPHILS # BLD AUTO: 4.7 10E3/UL (ref 1.6–8.3)
NEUTROPHILS NFR BLD AUTO: 60 %
NITRATE UR QL: NEGATIVE
NRBC # BLD AUTO: 0 10E3/UL
NRBC BLD AUTO-RTO: 0 /100
PH UR STRIP: 5 [PH] (ref 5–7)
PLATELET # BLD AUTO: 194 10E3/UL (ref 150–450)
POTASSIUM BLD-SCNC: 4 MMOL/L (ref 3.4–5.3)
PROT SERPL-MCNC: 8.1 G/DL (ref 6.8–8.8)
RBC # BLD AUTO: 5.21 10E6/UL (ref 4.4–5.9)
RBC URINE: 0 /HPF
SODIUM SERPL-SCNC: 139 MMOL/L (ref 133–144)
SP GR UR STRIP: 1.03 (ref 1–1.03)
TROPONIN I SERPL-MCNC: <0.015 UG/L (ref 0–0.04)
TSH SERPL DL<=0.005 MIU/L-ACNC: 1.79 MU/L (ref 0.4–4)
UROBILINOGEN UR STRIP-MCNC: NORMAL MG/DL
WBC # BLD AUTO: 7.8 10E3/UL (ref 4–11)
WBC URINE: 1 /HPF

## 2021-10-07 PROCEDURE — 83735 ASSAY OF MAGNESIUM: CPT | Performed by: INTERNAL MEDICINE

## 2021-10-07 PROCEDURE — 84443 ASSAY THYROID STIM HORMONE: CPT | Performed by: EMERGENCY MEDICINE

## 2021-10-07 PROCEDURE — 99285 EMERGENCY DEPT VISIT HI MDM: CPT | Mod: 25

## 2021-10-07 PROCEDURE — 36415 COLL VENOUS BLD VENIPUNCTURE: CPT | Performed by: EMERGENCY MEDICINE

## 2021-10-07 PROCEDURE — C9803 HOPD COVID-19 SPEC COLLECT: HCPCS

## 2021-10-07 PROCEDURE — 80048 BASIC METABOLIC PNL TOTAL CA: CPT | Performed by: PHYSICIAN ASSISTANT

## 2021-10-07 PROCEDURE — 93005 ELECTROCARDIOGRAM TRACING: CPT

## 2021-10-07 PROCEDURE — U0003 INFECTIOUS AGENT DETECTION BY NUCLEIC ACID (DNA OR RNA); SEVERE ACUTE RESPIRATORY SYNDROME CORONAVIRUS 2 (SARS-COV-2) (CORONAVIRUS DISEASE [COVID-19]), AMPLIFIED PROBE TECHNIQUE, MAKING USE OF HIGH THROUGHPUT TECHNOLOGIES AS DESCRIBED BY CMS-2020-01-R: HCPCS | Mod: ORL | Performed by: PHYSICIAN ASSISTANT

## 2021-10-07 PROCEDURE — 255N000002 HC RX 255 OP 636: Performed by: EMERGENCY MEDICINE

## 2021-10-07 PROCEDURE — A9585 GADOBUTROL INJECTION: HCPCS | Performed by: EMERGENCY MEDICINE

## 2021-10-07 PROCEDURE — 85025 COMPLETE CBC W/AUTO DIFF WBC: CPT | Performed by: EMERGENCY MEDICINE

## 2021-10-07 PROCEDURE — 83036 HEMOGLOBIN GLYCOSYLATED A1C: CPT | Performed by: INTERNAL MEDICINE

## 2021-10-07 PROCEDURE — 82465 ASSAY BLD/SERUM CHOLESTEROL: CPT | Performed by: INTERNAL MEDICINE

## 2021-10-07 PROCEDURE — 82040 ASSAY OF SERUM ALBUMIN: CPT | Performed by: EMERGENCY MEDICINE

## 2021-10-07 PROCEDURE — 87635 SARS-COV-2 COVID-19 AMP PRB: CPT | Performed by: EMERGENCY MEDICINE

## 2021-10-07 PROCEDURE — 120N000001 HC R&B MED SURG/OB

## 2021-10-07 PROCEDURE — 84484 ASSAY OF TROPONIN QUANT: CPT | Performed by: EMERGENCY MEDICINE

## 2021-10-07 PROCEDURE — 99223 1ST HOSP IP/OBS HIGH 75: CPT | Mod: AI | Performed by: INTERNAL MEDICINE

## 2021-10-07 PROCEDURE — 70553 MRI BRAIN STEM W/O & W/DYE: CPT

## 2021-10-07 PROCEDURE — 81001 URINALYSIS AUTO W/SCOPE: CPT | Performed by: EMERGENCY MEDICINE

## 2021-10-07 PROCEDURE — 82077 ASSAY SPEC XCP UR&BREATH IA: CPT | Performed by: EMERGENCY MEDICINE

## 2021-10-07 RX ORDER — GADOBUTROL 604.72 MG/ML
10 INJECTION INTRAVENOUS ONCE
Status: COMPLETED | OUTPATIENT
Start: 2021-10-07 | End: 2021-10-07

## 2021-10-07 RX ADMIN — GADOBUTROL 10 ML: 604.72 INJECTION INTRAVENOUS at 22:29

## 2021-10-07 ASSESSMENT — MIFFLIN-ST. JEOR: SCORE: 1819.99

## 2021-10-08 ENCOUNTER — APPOINTMENT (OUTPATIENT)
Dept: PHYSICAL THERAPY | Facility: CLINIC | Age: 66
DRG: 039 | End: 2021-10-08
Attending: INTERNAL MEDICINE
Payer: COMMERCIAL

## 2021-10-08 ENCOUNTER — ANESTHESIA EVENT (OUTPATIENT)
Dept: SURGERY | Facility: CLINIC | Age: 66
DRG: 039 | End: 2021-10-08
Payer: COMMERCIAL

## 2021-10-08 ENCOUNTER — APPOINTMENT (OUTPATIENT)
Dept: CARDIOLOGY | Facility: CLINIC | Age: 66
DRG: 039 | End: 2021-10-08
Attending: STUDENT IN AN ORGANIZED HEALTH CARE EDUCATION/TRAINING PROGRAM
Payer: COMMERCIAL

## 2021-10-08 ENCOUNTER — APPOINTMENT (OUTPATIENT)
Dept: CT IMAGING | Facility: CLINIC | Age: 66
DRG: 039 | End: 2021-10-08
Attending: EMERGENCY MEDICINE
Payer: COMMERCIAL

## 2021-10-08 ENCOUNTER — APPOINTMENT (OUTPATIENT)
Dept: SPEECH THERAPY | Facility: CLINIC | Age: 66
DRG: 039 | End: 2021-10-08
Attending: INTERNAL MEDICINE
Payer: COMMERCIAL

## 2021-10-08 ENCOUNTER — APPOINTMENT (OUTPATIENT)
Dept: ULTRASOUND IMAGING | Facility: CLINIC | Age: 66
DRG: 039 | End: 2021-10-08
Attending: EMERGENCY MEDICINE
Payer: COMMERCIAL

## 2021-10-08 PROBLEM — E78.2 MIXED HYPERLIPIDEMIA: Status: ACTIVE | Noted: 2021-10-08

## 2021-10-08 PROBLEM — I48.0 PAF (PAROXYSMAL ATRIAL FIBRILLATION) (H): Status: ACTIVE | Noted: 2017-08-30

## 2021-10-08 PROBLEM — E66.9 OBESITY: Status: ACTIVE | Noted: 2021-10-08

## 2021-10-08 PROBLEM — R01.1 HEART MURMUR: Status: ACTIVE | Noted: 2019-07-29

## 2021-10-08 PROBLEM — E11.9 TYPE 2 DIABETES MELLITUS WITHOUT COMPLICATION (H): Status: ACTIVE | Noted: 2021-10-08

## 2021-10-08 PROBLEM — K21.9 ESOPHAGEAL REFLUX: Status: ACTIVE | Noted: 2021-10-08

## 2021-10-08 LAB
ANION GAP SERPL CALCULATED.3IONS-SCNC: 9 MMOL/L (ref 5–18)
ATRIAL RATE - MUSE: 51 BPM
BUN SERPL-MCNC: 13 MG/DL (ref 8–22)
CALCIUM SERPL-MCNC: 9.3 MG/DL (ref 8.5–10.5)
CHLORIDE BLD-SCNC: 105 MMOL/L (ref 98–107)
CHOLEST SERPL-MCNC: 142 MG/DL
CO2 SERPL-SCNC: 25 MMOL/L (ref 22–31)
CREAT SERPL-MCNC: 0.9 MG/DL (ref 0.7–1.3)
DIASTOLIC BLOOD PRESSURE - MUSE: NORMAL MMHG
GFR SERPL CREATININE-BSD FRML MDRD: 88 ML/MIN/1.73M2
GLUCOSE BLD-MCNC: 222 MG/DL (ref 70–125)
GLUCOSE BLDC GLUCOMTR-MCNC: 154 MG/DL (ref 70–99)
GLUCOSE BLDC GLUCOMTR-MCNC: 177 MG/DL (ref 70–99)
HBA1C MFR BLD: 8.2 % (ref 0–5.6)
HDLC SERPL-MCNC: 40 MG/DL
INTERPRETATION ECG - MUSE: NORMAL
LDLC SERPL CALC-MCNC: 81 MG/DL
MAGNESIUM SERPL-MCNC: 2.2 MG/DL (ref 1.6–2.3)
NONHDLC SERPL-MCNC: 102 MG/DL
P AXIS - MUSE: 39 DEGREES
POTASSIUM BLD-SCNC: 4 MMOL/L (ref 3.4–5.3)
POTASSIUM BLD-SCNC: 4.6 MMOL/L (ref 3.5–5)
PR INTERVAL - MUSE: 174 MS
QRS DURATION - MUSE: 94 MS
QT - MUSE: 452 MS
QTC - MUSE: 416 MS
R AXIS - MUSE: 63 DEGREES
SARS-COV-2 RNA RESP QL NAA+PROBE: NEGATIVE
SODIUM SERPL-SCNC: 139 MMOL/L (ref 136–145)
SYSTOLIC BLOOD PRESSURE - MUSE: NORMAL MMHG
T AXIS - MUSE: 74 DEGREES
TRIGL SERPL-MCNC: 107 MG/DL
VENTRICULAR RATE- MUSE: 51 BPM

## 2021-10-08 PROCEDURE — 250N000013 HC RX MED GY IP 250 OP 250 PS 637: Performed by: EMERGENCY MEDICINE

## 2021-10-08 PROCEDURE — 250N000012 HC RX MED GY IP 250 OP 636 PS 637: Performed by: INTERNAL MEDICINE

## 2021-10-08 PROCEDURE — 97116 GAIT TRAINING THERAPY: CPT | Mod: GP | Performed by: PHYSICAL THERAPIST

## 2021-10-08 PROCEDURE — 120N000001 HC R&B MED SURG/OB

## 2021-10-08 PROCEDURE — 84132 ASSAY OF SERUM POTASSIUM: CPT | Performed by: INTERNAL MEDICINE

## 2021-10-08 PROCEDURE — 70450 CT HEAD/BRAIN W/O DYE: CPT

## 2021-10-08 PROCEDURE — 258N000003 HC RX IP 258 OP 636: Performed by: INTERNAL MEDICINE

## 2021-10-08 PROCEDURE — 70496 CT ANGIOGRAPHY HEAD: CPT

## 2021-10-08 PROCEDURE — 97530 THERAPEUTIC ACTIVITIES: CPT | Mod: GP | Performed by: PHYSICAL THERAPIST

## 2021-10-08 PROCEDURE — 99207 PR CONSULT E&M CHANGED TO INITIAL LEVEL: CPT | Performed by: PSYCHIATRY & NEUROLOGY

## 2021-10-08 PROCEDURE — 255N000002 HC RX 255 OP 636: Performed by: INTERNAL MEDICINE

## 2021-10-08 PROCEDURE — 999N000208 ECHOCARDIOGRAM COMPLETE

## 2021-10-08 PROCEDURE — 36415 COLL VENOUS BLD VENIPUNCTURE: CPT | Performed by: INTERNAL MEDICINE

## 2021-10-08 PROCEDURE — 250N000011 HC RX IP 250 OP 636: Performed by: EMERGENCY MEDICINE

## 2021-10-08 PROCEDURE — 99233 SBSQ HOSP IP/OBS HIGH 50: CPT | Performed by: INTERNAL MEDICINE

## 2021-10-08 PROCEDURE — 97161 PT EVAL LOW COMPLEX 20 MIN: CPT | Mod: GP | Performed by: PHYSICAL THERAPIST

## 2021-10-08 PROCEDURE — 99221 1ST HOSP IP/OBS SF/LOW 40: CPT | Performed by: PSYCHIATRY & NEUROLOGY

## 2021-10-08 PROCEDURE — 92610 EVALUATE SWALLOWING FUNCTION: CPT | Mod: GN | Performed by: REHABILITATION PRACTITIONER

## 2021-10-08 PROCEDURE — 250N000013 HC RX MED GY IP 250 OP 250 PS 637: Performed by: INTERNAL MEDICINE

## 2021-10-08 PROCEDURE — 250N000011 HC RX IP 250 OP 636: Performed by: PHYSICIAN ASSISTANT

## 2021-10-08 PROCEDURE — 93306 TTE W/DOPPLER COMPLETE: CPT | Mod: 26 | Performed by: INTERNAL MEDICINE

## 2021-10-08 PROCEDURE — 93880 EXTRACRANIAL BILAT STUDY: CPT

## 2021-10-08 PROCEDURE — 99222 1ST HOSP IP/OBS MODERATE 55: CPT | Mod: 57 | Performed by: SURGERY

## 2021-10-08 RX ORDER — LABETALOL HYDROCHLORIDE 5 MG/ML
10-20 INJECTION, SOLUTION INTRAVENOUS EVERY 10 MIN PRN
Status: DISCONTINUED | OUTPATIENT
Start: 2021-10-08 | End: 2021-10-09

## 2021-10-08 RX ORDER — HYDRALAZINE HYDROCHLORIDE 20 MG/ML
10-20 INJECTION INTRAMUSCULAR; INTRAVENOUS
Status: DISCONTINUED | OUTPATIENT
Start: 2021-10-08 | End: 2021-10-10 | Stop reason: HOSPADM

## 2021-10-08 RX ORDER — IOPAMIDOL 755 MG/ML
70 INJECTION, SOLUTION INTRAVASCULAR ONCE
Status: COMPLETED | OUTPATIENT
Start: 2021-10-08 | End: 2021-10-08

## 2021-10-08 RX ORDER — SODIUM CHLORIDE 9 MG/ML
INJECTION, SOLUTION INTRAVENOUS CONTINUOUS PRN
Status: DISCONTINUED | OUTPATIENT
Start: 2021-10-08 | End: 2021-10-10 | Stop reason: HOSPADM

## 2021-10-08 RX ORDER — LIDOCAINE 40 MG/G
CREAM TOPICAL
Status: DISCONTINUED | OUTPATIENT
Start: 2021-10-08 | End: 2021-10-09

## 2021-10-08 RX ORDER — ATORVASTATIN CALCIUM 40 MG/1
40 TABLET, FILM COATED ORAL DAILY
Status: DISCONTINUED | OUTPATIENT
Start: 2021-10-08 | End: 2021-10-08

## 2021-10-08 RX ORDER — DEXTROSE MONOHYDRATE 25 G/50ML
25-50 INJECTION, SOLUTION INTRAVENOUS
Status: DISCONTINUED | OUTPATIENT
Start: 2021-10-08 | End: 2021-10-10 | Stop reason: HOSPADM

## 2021-10-08 RX ORDER — AMOXICILLIN 250 MG
1-2 CAPSULE ORAL 2 TIMES DAILY
Status: DISCONTINUED | OUTPATIENT
Start: 2021-10-08 | End: 2021-10-10 | Stop reason: HOSPADM

## 2021-10-08 RX ORDER — NICOTINE POLACRILEX 4 MG
15-30 LOZENGE BUCCAL
Status: DISCONTINUED | OUTPATIENT
Start: 2021-10-08 | End: 2021-10-10 | Stop reason: HOSPADM

## 2021-10-08 RX ORDER — HEPARIN SODIUM 10000 [USP'U]/100ML
0-5000 INJECTION, SOLUTION INTRAVENOUS CONTINUOUS
Status: DISCONTINUED | OUTPATIENT
Start: 2021-10-08 | End: 2021-10-08

## 2021-10-08 RX ORDER — CLOPIDOGREL BISULFATE 75 MG/1
75 TABLET ORAL DAILY
Status: DISCONTINUED | OUTPATIENT
Start: 2021-10-09 | End: 2021-10-10 | Stop reason: HOSPADM

## 2021-10-08 RX ORDER — ATORVASTATIN CALCIUM 40 MG/1
80 TABLET, FILM COATED ORAL DAILY
Status: DISCONTINUED | OUTPATIENT
Start: 2021-10-09 | End: 2021-10-10 | Stop reason: HOSPADM

## 2021-10-08 RX ORDER — ASPIRIN 81 MG/1
243 TABLET, CHEWABLE ORAL ONCE
Status: COMPLETED | OUTPATIENT
Start: 2021-10-08 | End: 2021-10-08

## 2021-10-08 RX ORDER — ASPIRIN 81 MG/1
81 TABLET, CHEWABLE ORAL ONCE
Status: COMPLETED | OUTPATIENT
Start: 2021-10-08 | End: 2021-10-08

## 2021-10-08 RX ORDER — ATORVASTATIN CALCIUM 80 MG/1
80 TABLET, FILM COATED ORAL DAILY
Status: DISCONTINUED | OUTPATIENT
Start: 2021-10-09 | End: 2021-10-08

## 2021-10-08 RX ORDER — ONDANSETRON 4 MG/1
4 TABLET, ORALLY DISINTEGRATING ORAL EVERY 6 HOURS PRN
Status: DISCONTINUED | OUTPATIENT
Start: 2021-10-08 | End: 2021-10-09

## 2021-10-08 RX ORDER — ONDANSETRON 2 MG/ML
4 INJECTION INTRAMUSCULAR; INTRAVENOUS EVERY 6 HOURS PRN
Status: DISCONTINUED | OUTPATIENT
Start: 2021-10-08 | End: 2021-10-09

## 2021-10-08 RX ORDER — CLOPIDOGREL 300 MG/1
300 TABLET, FILM COATED ORAL ONCE
Status: COMPLETED | OUTPATIENT
Start: 2021-10-08 | End: 2021-10-08

## 2021-10-08 RX ORDER — ASPIRIN 81 MG/1
81 TABLET ORAL DAILY
Status: DISCONTINUED | OUTPATIENT
Start: 2021-10-09 | End: 2021-10-10

## 2021-10-08 RX ADMIN — CLOPIDOGREL BISULFATE 300 MG: 300 TABLET, FILM COATED ORAL at 01:58

## 2021-10-08 RX ADMIN — HUMAN ALBUMIN MICROSPHERES AND PERFLUTREN 9 ML: 10; .22 INJECTION, SOLUTION INTRAVENOUS at 08:43

## 2021-10-08 RX ADMIN — SODIUM CHLORIDE: 9 INJECTION, SOLUTION INTRAVENOUS at 09:49

## 2021-10-08 RX ADMIN — IOPAMIDOL 70 ML: 755 INJECTION, SOLUTION INTRAVENOUS at 00:59

## 2021-10-08 RX ADMIN — ASPIRIN 81 MG CHEWABLE TABLET 81 MG: 81 TABLET CHEWABLE at 01:29

## 2021-10-08 RX ADMIN — HEPARIN SODIUM 1200 UNITS/HR: 10000 INJECTION, SOLUTION INTRAVENOUS at 09:45

## 2021-10-08 RX ADMIN — SODIUM CHLORIDE: 9 INJECTION, SOLUTION INTRAVENOUS at 20:26

## 2021-10-08 RX ADMIN — ASPIRIN 81 MG CHEWABLE TABLET 243 MG: 81 TABLET CHEWABLE at 01:58

## 2021-10-08 RX ADMIN — SENNOSIDES AND DOCUSATE SODIUM 2 TABLET: 8.6; 5 TABLET ORAL at 20:25

## 2021-10-08 RX ADMIN — SENNOSIDES AND DOCUSATE SODIUM 1 TABLET: 8.6; 5 TABLET ORAL at 11:36

## 2021-10-08 RX ADMIN — INSULIN ASPART 1 UNITS: 100 INJECTION, SOLUTION INTRAVENOUS; SUBCUTANEOUS at 18:20

## 2021-10-08 ASSESSMENT — ACTIVITIES OF DAILY LIVING (ADL)
ADLS_ACUITY_SCORE: 5
FALL_HISTORY_WITHIN_LAST_SIX_MONTHS: NO

## 2021-10-08 NOTE — CONSULTS
"      [facility]    Stroke Telephone Note    I was called by Chad A. Trierweiler on 10/08/21 regarding patient Reggie Roper. The patient is a 66 year old male w pmhx of HTN HLD DM2 afib on Xarelto comes in with confusion, RUE weakness/clumsiness, found to have scattered subcortical LMCA white matter, L insular, infarcts.    Patient notes that 0630 on 10/6 was getting ready for work when fell and had syncopal vs presyncopal episode, evaluated by EMS who told patient to follow-up w MD, at work had issues with weakness vs clumsiness with his RUE, patient was seen in clinic 10/7 and told to present to [place], got lost, went home, later presented to [facility] after family insistence. Patient takes Xarelto for afib admits to missing a Xarelto dose recently.    Imaging Findings   MRI brain reveals scattered LMCA subcortical, L insular infarcts. No blood on T*.  CTA H+N shows critical stenosis of LICA appears to still have minimal flow, ROCKY significant stenosis at origin.    Impression  Acute ischemic stroke of LMCA subcortical, L insula due to large vessel disease in setting of HLD DM, possible exacerbated by cardioembolism 2/2 afib 2/2 Xarelto non-compliance.    R sided weakness vs clumsiness consistent with location of LMCA infarcts. Unclear if infarcts are source of mild confusion but can be explained by hypoperfusion from critical stenosis LICA. Patient needs to come in for close observation and urgent revascularization.    Recommendations   - Use orderset: \"Ischemic Stroke Routine Admission\" or \"Ischemic Stroke No Thrombolytics/No Thrombectomy ICU Admission\"  - Neurochecks and Vital Signs every q2h   - Permissive HTN; goal SBP < 220 mmHg, avoid hypoperfusion  - Daily aspirin 325 mg for secondary stroke prevention  - Plavix 300mg load, 75mg every day after  - Statin: Atorvastatin 40mg qhs  - Urgent carotid US  - TTE (with Bubble Study if age 60 yrs or less)  - Telemetry, EKG  - Bedside " "Glucose Monitoring  - A1c, Lipid Panel, Troponin x 3  - PT/OT/SLP  - Stroke Education  - Euthermia, Euglycemia  - Swallow study per RN  - Hold Xarelto pending intervention   - NPO for intervention    - Plan communicated to ED attending and Hospitalist    My recommendations are based on the information provided over the phone by Reggie Roper's in-person providers. They are not intended to replace the clinical judgment of his in-person providers. I was not requested to personally see or examine the patient at this time.    The Stroke Staff is Dr. Holden.    Ivan Pozo MD  Vascular Neurology Fellow  To page me or covering stroke neurology team member, click here: AMCOM   Choose \"On Call\" tab at top, then search dropdown box for \"Neurology Adult\", select location, press Enter, then look for stroke/neuro ICU/telestroke.      "

## 2021-10-08 NOTE — PROGRESS NOTES
"   10/08/21 1032   General Information   Onset of Illness/Injury or Date of Surgery 10/07/21   Referring Physician Connor Craig MD   Patient/Family Therapy Goal Statement (SLP) Pt states \"I am here to have surgery tomorrow\".   Pertinent History of Current Problem Per chart review \"Reggie Roper is a 66 year old year old male who has a PMH significant for Afib on Xarelto, HTN, HLD, DM2, GERD presented after fall after getting out of shower on 10/6.  Found to have left acute/subacute MCA stroke and left ICA stenosis greater than 90% concerning for symptomatic carotid artery stenosis.\"    General Observations Patient is very pleasant and cooperative, though some word retrieval deficits and confusion are evident during conversation.   Dentition (Oral Motor)   Dentition (Oral Motor) natural dentition;adequate dentition   Facial Symmetry (Oral Motor)   Facial Symmetry (Oral Motor) WNL   Lip Function (Oral Motor)   Lip Range of Motion (Oral Motor) WNL   Tongue Function (Oral Motor)   Tongue ROM (Oral Motor) WNL   General Swallowing Observations   Current Diet/Method of Nutritional Intake (General Swallowing Observations, NIS) thin liquids (level 0);regular diet   Clinical Swallow Evaluation   Feeding Assistance no assistance needed   Clinical Swallow Eval: Thin Liquid Texture Trial   Mode of Presentation, Thin Liquids cup;straw;self-fed   Oral Phase of Swallow WFL   Pharyngeal Phase of Swallow   (no overt s/s aspiration noted with trials of thin liquids)   Clinical Swallow Evaluation: Puree Solid Texture Trial   Mode of Presentation, Puree spoon;self-fed   Oral Phase, Puree WFL   Pharyngeal Phase, Puree   (no overt s/s aspiration)   Clinical Swallow Evaluation: Solid Food Texture Trial   Mode of Presentation self-fed   Volume Presented kamaljit crackers   Oral Phase WFL   Pharyngeal Phase   (no overt s/s aspiration)   Swallowing Recommendations   Diet Consistency Recommendations thin liquids (level 0);regular diet "   Supervision Level for Intake distant supervision needed   Mode of Delivery Recommendations bolus size, small   Monitoring/Assistance Required (Eating/Swallowing)   (monitor for s/s aspiration)   Recommended Feeding/Eating Techniques (Swallow Eval) maintain upright sitting position for eating   Medication Administration Recommendations, Swallowing (SLP) pills with water or applesauce   Comment, Swallowing Recommendations No overt s/s aspiration noted during bedside evaluation. However, pt started coughing as SLP was leaving the room. Coughing appears unrelated to swallowing but recommend continue to monitor diet tolerance.   SLP Therapy Assessment/Plan   Criteria for Skilled Therapeutic Interventions Met (SLP Eval) yes;treatment indicated   Rehab Potential (SLP Eval) good, to achieve stated therapy goals   Therapy Frequency (SLP Eval) daily   Predicted Duration of Therapy Intervention (SLP Eval) 3 days for swallow   Therapy Plan Review/Discharge Plan (SLP)   Therapy Plan Review (SLP) care plan/treatment goals reviewed   SLP Discharge Planning    SLP Discharge Recommendation (DC Rec) home with assist  (or ARC)   SLP Rationale for DC Rec below baseline communication   SLP Brief overview of current status  Swallow function appears WFL to safely tolerate regular solids/thin liquids. Straws Ok. No overt s/s aspiration noted during bedside evaluation. However, pt started coughing as SLP was leaving the room. Coughing appears unrelated to swallowing but recommend continue to monitor diet tolerance.    Total Evaluation Time   Total Evaluation Time (Minutes) 20

## 2021-10-08 NOTE — H&P
[facility]    History and Physical  Hospitalist       Date of Admission:  10/7/2021  Date of Service (when I saw the patient): 10/08/21    Assessment & Plan   Reggie Roper is a 66 year old male who presents with neurologic changes    CVA  Getting ready for work 10/6 am when had fall (syncope vs near-syncope). Apparently disoriented, went to work and some R arm issues. EMS evaluated but not transported. Some confusion after done with work, dtr involved, ultimately to ED. Appears mildly confused/ confabulating in ED but otherwise doing well. Vitals stable. BPs elevated mildly. Labs normal. MRI with multifocal small late acute to early subacute infarcts in the L MCA territory. CT/ CTA with critical stenosis, d/w neuro fellow, likely surgery in the morning.   - telemetry   - q2 hour neuro/ vitals  - neuro stroke consult  - permissive hypertension SBP<220  - aspirin, plavix loaded in ED, further doses per neurology  - carotid ultrasound pending  - hold Eliquis pending neuro assessment  - continue home atorvastatin 40 mg daily  - TTE (pending surgery plans/ schedule- didn't order on admission)  - A1C, lipids, troponins  - PT/OT/SLP eventually  - keep NPO  - vascular surgery consult    Atrial fibrillation  Mild CAD on CT angiogram ~2014  HTN  HLD  [needs rec- atorvastatin 40 mg daily, bystolic 10 mg daily, rivaroxaban 20 mg daily, tekturna 150 mg daily]  - permissive hypertension  - continue atorvastatin 40 mg daily  - hold Eliquis for now    DM II, non-insulin dependent  [needs rec- metformin 2000 mg daily]  - check A1C  - hold metformin inpatient  - BID and HS blood sugars  - start sliding scale insulin if indicated    GERD  [needs rec- omeprazole 20 mg daily]  - resume with rec    COVID-19 negative    DVT Prophylaxis: Pneumatic Compression Devices and Ambulate every shift  Code Status: Full Code    Disposition: Expected discharge in 2-3 days     Connor Craig MD  [phone] (P)  Text  Page     Primary Care Physician   Simin Sanderson    Chief Complaint   confusion    History is obtained from the patient and medical records.  Note patient is somewhat limited history unclear whether he is confabulating versus baseline.    History of Present Illness   Reggie Roper is a 66 year old male who presents with confusion and weakness.  No history from patient is difficulty as he is vague in his answers, almost appears like he is confabulating.  He reportedly had a fall at home the day prior to presentation when he was getting ready for work.  He denies loss of consciousness.  He works overnight and then went to work.  While he was at work he had another episode of confusion and difficult time using his right hand.  EMS was called but is unclear what the assessment was he was transported to the hospital.  He ultimately left work L and went home.  His daughter tried calling him and could not get a hold of him so she called police to do a welfare check.  They found him sleeping and was doing well apparently.  He scheduled an appointment to see his doctor on the day of presentation and ultimately was advised to go to the hospital.  He attempted to drive to the hospital but got lost and went back home.  He was trying to text his daughter and compresses family but he was confused.  His ex-wife ultimately brought him to the hospital.  The time of visit with him he states he is doing fine.  Denies any weakness in his right arm or leg.  He does not think he is confused.  Denies any palpitations or chest pain.  Denies shortness of breath.  Denies any abdominal symptoms.  He does have history of a atrial fibrillation and states that he has been taking his Xarelto daily as prescribed.  He also has a history of diabetes and hypertension.    Past Medical History    I have reviewed this patient's medical history and updated it with pertinent information if needed.   Past Medical History:   Diagnosis Date     Abnormal stress  test      Chest pain     Midsternal Pain     Diabetes mellitus (H)      Hyperlipidemia      Hypertension      Shortness of breath on exertion        Past Surgical History   I have reviewed this patient's surgical history and updated it with pertinent information if needed.  Past Surgical History:   Procedure Laterality Date     CATARACT EXTRACTION         Prior to Admission Medications   Prior to Admission Medications   Prescriptions Last Dose Informant Patient Reported? Taking?   BYSTOLIC 10 mg tablet   No No   Sig: [BYSTOLIC 10 MG TABLET] TAKE 1 TABLET BY MOUTH DAILY FOR BLOOD PRESSURE   TEKTURNA 150 mg tablet   No No   Sig: [TEKTURNA 150 MG TABLET] TAKE 1 TABLET BY MOUTH ONCE DAILY FOR BLOOD PRESSURE   atorvastatin (LIPITOR) 40 MG tablet   No No   Sig: [ATORVASTATIN (LIPITOR) 40 MG TABLET] TAKE 1 TABLET BY MOUTH AT BEDTIME.   metFORMIN (GLUCOPHAGE) 500 MG tablet   Yes No   Sig: [METFORMIN (GLUCOPHAGE) 500 MG TABLET] 2,000 mg daily.    omeprazole (PRILOSEC) 20 MG capsule   Yes No   Sig: [OMEPRAZOLE (PRILOSEC) 20 MG CAPSULE] Take 20 mg by mouth daily.   rivaroxaban ANTICOAGULANT (XARELTO) 20 MG TABS tablet   No No   Sig: Take 1 tablet (20 mg) by mouth daily (with dinner)      Facility-Administered Medications: None     Allergies   No Known Allergies    Social History   I have reviewed this patient's social history and updated it with pertinent information if needed. Reggie LUX Roper  reports that he has quit smoking. His smoking use included cigars and cigars. He has never used smokeless tobacco. He reports current alcohol use. He reports that he does not use drugs.    Family History   I have reviewed this patient's family history and updated it with pertinent information if needed.   Family History   Problem Relation Age of Onset     Heart Disease Father      CABG Father 78.00       Review of Systems   The 10 point Review of Systems is negative other than noted in the HPI or here.     Physical Exam   Temp: 97.1  F  (36.2  C) Temp src: Temporal BP: 130/66 Pulse: 53   Resp: 28 SpO2: 96 % O2 Device: None (Room air)    Vital Signs with Ranges  229 lbs 0 oz    Constitutional: alert, oriented and in no acute distress  Eyes: EOMI, PERRL  HEENT: OP clear  Respiratory: CTA B without w/c  Cardiovascular: bradycardic, sinus no murmur. trace edema.  GI: soft, nontender, nondistended, BS present  Lymph/Hematologic: no cervical LAD  Genitourinary: deferred  Skin: no rashes or lesions grossly  Musculoskeletal: no deformities or arthritis  Neurologic: CN II-XII, strength on R intact. Appears confused or confabulating, unclear baseline  Psychiatric: mood and affect wnl    Data   Data reviewed today:  I personally reviewed the EKG tracing showing bradycardia, sinus rhythm and the brain MRI image(s) showing CVA's.  Recent Labs   Lab 10/07/21  2125   WBC 7.8   HGB 16.2   MCV 95         POTASSIUM 4.0   CHLORIDE 105   CO2 28   BUN 16   CR 0.96   ANIONGAP 6   JESSICA 8.9   *   ALBUMIN 4.1   PROTTOTAL 8.1   BILITOTAL 0.9   ALKPHOS 120   ALT 64   AST 29   TROPONIN <0.015       Recent Results (from the past 24 hour(s))   MR Brain w/o & w Contrast    Narrative    EXAM: MR BRAIN W/O and W CONTRAST  LOCATION: [facility]  DATE/TIME: 10/7/2021 10:18 PM    INDICATION: Confusion and slurred speech  COMPARISON: 09/27/2013  CONTRAST: 10 mL Gadavist  TECHNIQUE: Routine multiplanar multisequence head MRI without and with intravenous contrast.    FINDINGS:  INTRACRANIAL CONTENTS: Multiple foci of diffusion restriction in the left insula and external capsule, the left frontal lobe white matter, and the centrum semiovale. The largest areas of diffusion restriction in the left centrum semiovale and left insula   and subinsular white matter measure 20 x 12 mm and 10 x 15 mm respectively. There is mild associated FLAIR hyperintensity, and findings are likely late acute to early subacute in age. No mass, acute hemorrhage, or  extra-axial fluid collections.   Scattered nonspecific T2/FLAIR hyperintensities within the cerebral white matter most consistent with mild chronic microvascular ischemic change. Mild generalized cerebral atrophy. No hydrocephalus. Normal position of the cerebellar tonsils. No   pathologic contrast enhancement.    SELLA: No abnormality accounting for technique.    OSSEOUS STRUCTURES/SOFT TISSUES: Normal marrow signal. The major intracranial vascular flow voids are maintained.     ORBITS: Prior bilateral cataract surgery. Visualized portions of the orbits are otherwise unremarkable.     SINUSES/MASTOIDS: Mucosal thickening primarily involving the ethmoid air cells. Question prior left mastoidectomy with scattered opacification of the bilateral mastoid air cells.       Impression    IMPRESSION:  1.  Multifocal small late acute to early subacute infarcts in the left MCA territory involving the left insula and subinsular white matter, the left frontal lobe white matter, and the left centrum semiovale.  2.  Mild generalized parenchymal volume loss and sequela of chronic microvascular ischemic disease.    Findings were discussed with Dr. Trierweiler at 2312 hours on 10/07/2021.

## 2021-10-08 NOTE — CONSULTS
[facility]    Stroke Consult Note    Reason for Consult: Stroke    Chief Complaint: Altered Mental Status       HPI  Reggie Roper is a 66 year old male with past medical history pertinent for HTN, HLD, DMT2, CAD, non-smoker, paroxysmal atrial fibrillation with reported compliance to Xarelto (possibly missed one dose per report) who presented to the ED 10/7/21 due to following a recurrent presyncopal episodes x 1 day with one fall 10/6/21 0630 associated with R hand weakness and disorientation while at work, EMS contacted but not brought to ED. Daughter tried calling him and was not able to contact so called police for welfare check. He was sleeping and feeling well. He went to the clinic and head scan ordered and directed to present to [facility]; however, he was unable to find his way so went home instead. He was unable to communicate appropriately to family via text later so ex-wife brought to ED here.    Patient reported feeling back to baseline on arrival, wife reported he was still disoriented to details of the day, time, and place. No focal weaknesses present, R hand weakness resolved. Imaging showed L MCA acute/subacute infarcts and near complete L carotid occlusion. Not candidate for TNK.     Stroke Evaluation Summarized    MRI/Head CT NCCT 10/7/21: Left MCA territory small patchy acute infarcts, Age-related changes  MRI 10/7/21: Multifocal small late acute/early subacute infarcts L MCA (L insula and subinsular white matter, L frontal lobe white matter, and the L centrum semiovale) chronic microvascular ischemic disease.   Intracranial Vasculature CTA head: L proximal-mid M2 segments superior division and mid division occluded with reconstitution, Left MCA collaterals good, L ICA at the skull base demonstrates long segment moderate stenosis, L ICA sluggish flow, otherwise, no significant stenosis/occlusion.   Cervical Vasculature CTA neck: Left proximal carotid bulb  "greater than 90% stenosis. Left mid to distal carotid bulb occlusion or near occlusion with reconstitution. Left cervical ICA is significantly diminutive with long segment moderate to severe stenosis-may be secondary to the proximal occlusion or near occlusion versus superimposed nonocclusive dissection, Atherosclerotic plaque < 50% stenosis in the right bulb     Echocardiogram TTE 10/8/21: LA mildly dilated, RA not well visualized, LV not well visualized, diastolic function indeterminate   EKG/Telemetry ECG 10/7/21: Sinus bradycardia, no acute change   Other Testing Bilateral Carotid US: 50-69% stenosis R ICA, Near occlusion L carotid bulb/proximal ICA     LDL  10/7/2021: 81 mg/dL   A1C  10/7/2021: 8.2 %   Troponin 10/7/2021: <0.015 ug/L       Impression  Acute ischemic stroke of Multifocal small late acute/early subacute infarcts L MCA (L insula and subinsular white matter, L frontal lobe white matter, and the L centrum semiovale), embolic from L carotid occlusion.    Recommendations  - Use orderset: \"Ischemic Stroke Routine Admission\" or \"Ischemic Stroke No Thrombolytics/No Thrombectomy ICU Admission\"  - appreciate vascular surgery L carotid endarterectomy planned for 0730 tomorrow  - Neurochecks and Vital Signs every 2 hrs   - Permissive HTN; goal SBP < 220 mmHg  - on Xarelto for Afib at home--held on admission, start DAPT pending CEA  - Statin: was on atorvastatin 40 mg daily at home, increase to 80 mg, LDL 81 (goal 40-70)  - Telemetry  - Bedside Glucose Monitoring  - PT/OT/SLP  - Stroke Education  - Euthermia, Euglycemia, Eunatremia  -Hgb A1c 8.2%, need tighter control of Diabetes, (goal <7%), recommend Mediterranean diet, follow-up with PCP  -PT/OT/SPT    Patient Follow-up    - final recommendation pending work-up    Thank you for this consult. We will continue to follow.     Shannon Celaya PA-C  Vascular Neurology  To page me or covering stroke neurology team member, click here: [facility]COM   Choose \"On Call\" " "tab at top, then search dropdown box for \"Neurology Adult\", select location, press Enter, then look for stroke/neuro ICU/telestroke.    _____________________________________________________    Clinically Significant Risk Factors Present on Admission            # Coagulation Defect: home medication list includes an anticoagulant medication        Past Medical History   Past Medical History:   Diagnosis Date     Abnormal stress test      Chest pain     Midsternal Pain     Diabetes mellitus (H)      Hyperlipidemia      Hypertension      Shortness of breath on exertion      Past Surgical History   Past Surgical History:   Procedure Laterality Date     CATARACT EXTRACTION       Medications   Home Meds  Prior to Admission medications    Medication Sig Start Date End Date Taking? Authorizing Provider   atorvastatin (LIPITOR) 40 MG tablet [ATORVASTATIN (LIPITOR) 40 MG TABLET] TAKE 1 TABLET BY MOUTH AT BEDTIME. 6/3/16   Jonny Bowman MD   BYSTOLIC 10 mg tablet [BYSTOLIC 10 MG TABLET] TAKE 1 TABLET BY MOUTH DAILY FOR BLOOD PRESSURE 4/25/16   Jonny Bowman MD   metFORMIN (GLUCOPHAGE) 500 MG tablet [METFORMIN (GLUCOPHAGE) 500 MG TABLET] 2,000 mg daily.  1/20/17   Provider, Historical   omeprazole (PRILOSEC) 20 MG capsule [OMEPRAZOLE (PRILOSEC) 20 MG CAPSULE] Take 20 mg by mouth daily. 6/20/14   Provider, Historical   rivaroxaban ANTICOAGULANT (XARELTO) 20 MG TABS tablet Take 1 tablet (20 mg) by mouth daily (with dinner) 8/9/21   Nino Salazar MD   TEKTURNA 150 mg tablet [TEKTURNA 150 MG TABLET] TAKE 1 TABLET BY MOUTH ONCE DAILY FOR BLOOD PRESSURE 9/1/17   Jimmy Richards MD       Scheduled Meds    [START ON 10/9/2021] atorvastatin  80 mg Oral Daily     sodium chloride 0.9 %  90 mL Intravenous Once     senna-docusate  1-2 tablet Oral or NG Tube BID     sodium chloride (PF)  3 mL Intracatheter Q8H     sodium chloride (PF)  3 mL Intracatheter Q8H       Infusion Meds    heparin       - MEDICATION INSTRUCTIONS -       " sodium chloride         PRN Meds  labetalol **OR** hydrALAZINE, lidocaine 4%, lidocaine (buffered or not buffered), magnesium hydroxide, - MEDICATION INSTRUCTIONS -, ondansetron **OR** ondansetron, sodium chloride (PF), sodium chloride (PF), sodium chloride    Allergies   No Known Allergies  Family History   Family History   Problem Relation Age of Onset     Heart Disease Father      CABG Father 78.00     Social History   Social History     Tobacco Use     Smoking status: Former Smoker     Types: Cigars, Cigars     Smokeless tobacco: Never Used   Substance Use Topics     Alcohol use: Yes     Comment: Alcoholic Drinks/day: occasional     Drug use: No       Review of Systems   The 10 point Review of Systems is negative other than noted in the HPI or here.        PHYSICAL EXAMINATION   Temp:  [97.1  F (36.2  C)] 97.1  F (36.2  C)  Pulse:  [49-78] 52  Resp:  [14-28] 14  BP: (125-173)/() 157/80  SpO2:  [92 %-100 %] 100 %    General Exam  General:  patient lying in bed without any acute distress    HEENT:  normocephalic/atraumatic  Cardio:  RRR  Pulmonary:  no respiratory distress    Neuro Exam  Mental Status:  alert, oriented x 3, follows commands, speech clear and fluent, naming and repetition normal  Cranial Nerves:  visual fields intact, EOMI with normal smooth pursuit, facial sensation intact and symmetric, facial movements symmetric, hearing not formally tested but intact to conversation, palate elevation symmetric and uvula midline, no dysarthria, tongue protrusion midline  Motor:  normal muscle tone and bulk, no abnormal movements, able to move all limbs spontaneously, strength 5/5 throughout upper and lower extremities, no pronator drift  Reflexes:  toes down-going  Sensory:  light touch sensation intact and symmetric throughout upper and lower extremities, no extinction on double simultaneous stimulation   Coordination:  normal finger-to-nose and heel-to-shin bilaterally without dysmetria, rapid  alternating movements symmetric  Station/Gait:  deferred    Dysphagia Screen  Per Nursing    Stroke Scales    NIHSS  Interval     Interval Comments arrival to neuro floor, initial stroke consult (10/08/21 0927)   1a. Level of Consciousness 0-->Alert, keenly responsive   1b. LOC Questions 0-->Answers both questions correctly   1c. LOC Commands 0-->Performs both tasks correctly   2.   Best Gaze 0-->Normal   3.   Visual 0-->No visual loss   4.   Facial Palsy 0-->Normal symmetrical movements   5a. Motor Arm, Left 0-->No drift, limb holds 90 (or 45) degrees for full 10 secs   5b. Motor Arm, Right 0-->No drift, limb holds 90 (or 45) degrees for full 10 secs   6a. Motor Leg, Left 0-->No drift, leg holds 30 degree position for full 5 secs   6b. Motor Leg, right 0-->No drift, leg holds 30 degree position for full 5 secs   7.   Limb Ataxia 0-->Absent   8.   Sensory 0-->Normal, no sensory loss   9.   Best Language 0-->No aphasia, normal   10. Dysarthria 0-->Normal   11. Extinction and Inattention  0-->No abnormality   Total 0 (10/08/21 0927)       Modified Arnulfo Score (Pre-morbid)  0-No deficits    Imaging  I personally reviewed all imaging; relevant findings per HPI.    Labs Data   CBC  Recent Labs   Lab 10/07/21  2125   WBC 7.8   RBC 5.21   HGB 16.2   HCT 49.3        Basic Metabolic Panel   Recent Labs   Lab 10/08/21  0257 10/07/21  2125   NA  --  139   POTASSIUM 4.0 4.0   CHLORIDE  --  105   CO2  --  28   BUN  --  16   CR  --  0.96   GLC  --  162*   JESSICA  --  8.9     Liver Panel  Recent Labs   Lab 10/07/21  2125   PROTTOTAL 8.1   ALBUMIN 4.1   BILITOTAL 0.9   ALKPHOS 120   AST 29   ALT 64     INR  No lab results found.   Lipid Profile    Recent Labs   Lab Test 10/07/21  2125 03/08/21  0941 04/06/20  1210   CHOL 142 128 133   HDL 40 34* 37*   LDL 81 59 69   TRIG 107 173* 135     A1C    Recent Labs   Lab Test 10/07/21  2125   A1C 8.2*     Troponin I    Recent Labs   Lab 10/07/21  2125   TROPONIN <0.015          Stroke  Consult Data Data This was a non-emergent, non-telestroke consult.

## 2021-10-08 NOTE — CONSULTS
[facility] PATIENT CONSULTATION [facility]    HPI:  Reggie Roper is a 66 year old year old male who has a PMH significant for Afib on Xarelto, HTN, HLD, DM2, GERD presented after fall after getting out of shower on 10/6.  Found to have left acute/subacute MCA stroke and left ICA stenosis greater than 90% concerning for symptomatic carotid artery stenosis.  No deficits currently.  He states on 10/6 he fell getting out of the shower but reported no weakness and went to work the next day.  He was feeling unwell, left work and told his daughter who is a nurse who told him to go to the ED. Missed Xarelto doses. No amaurosis fugax symptoms. Denies any current hemiparesis/hemiplegia or facial drooping. No tobacco use.    Review Of Systems:   General: Denies F/C  Respiratory: Denies SOB  Cardio: Denies CP  Gastrointestinal: Denies N/V  Genitourinary: Denies recent change in urination  Musculoskeletal: See HPI  Neurologic: Denies HA  Psychiatric: Denies confusion  Hematology/immunology: no unexpected bruising    PAST MEDICAL HISTORY:  Past Medical History:   Diagnosis Date     Abnormal stress test      Chest pain     Midsternal Pain     Diabetes mellitus (H)      Hyperlipidemia      Hypertension      Shortness of breath on exertion        PAST SURGICAL HISTORY:  Past Surgical History:   Procedure Laterality Date     CATARACT EXTRACTION         FAMILY HISTORY:  Family History   Problem Relation Age of Onset     Heart Disease Father      CABG Father 78.00       SOCIAL HISTORY:   Social History     Tobacco Use     Smoking status: Former Smoker     Types: Cigars, Cigars     Smokeless tobacco: Never Used   Substance Use Topics     Alcohol use: Yes     Comment: Alcoholic Drinks/day: occasional     HOME MEDICATIONS:  Prior to Admission medications    Medication Sig Start Date End Date Taking? Authorizing Provider   atorvastatin (LIPITOR) 40 MG tablet [ATORVASTATIN (LIPITOR) 40 MG TABLET] TAKE 1 TABLET BY MOUTH AT BEDTIME.  "6/3/16   Jonny Bowman MD   BYSTOLIC 10 mg tablet [BYSTOLIC 10 MG TABLET] TAKE 1 TABLET BY MOUTH DAILY FOR BLOOD PRESSURE 4/25/16   Jonny Bowman MD   metFORMIN (GLUCOPHAGE) 500 MG tablet [METFORMIN (GLUCOPHAGE) 500 MG TABLET] 2,000 mg daily.  1/20/17   Provider, Historical   omeprazole (PRILOSEC) 20 MG capsule [OMEPRAZOLE (PRILOSEC) 20 MG CAPSULE] Take 20 mg by mouth daily. 6/20/14   Provider, Historical   rivaroxaban ANTICOAGULANT (XARELTO) 20 MG TABS tablet Take 1 tablet (20 mg) by mouth daily (with dinner) 8/9/21   Nino Salazar MD   TEKTURNA 150 mg tablet [TEKTURNA 150 MG TABLET] TAKE 1 TABLET BY MOUTH ONCE DAILY FOR BLOOD PRESSURE 9/1/17   Jimmy Richards MD       VITAL SIGNS:  /78   Pulse 54   Temp 97.1  F (36.2  C) (Temporal)   Resp 16   Ht 1.778 m (5' 10\")   Wt 103.9 kg (229 lb)   SpO2 98%   BMI 32.86 kg/m    No intake or output data in the 24 hours ending 10/08/21 0747    Labs:  ROUTINE IP LABS (Last four results)  BMP  Recent Labs   Lab 10/08/21  0257 10/07/21  2125   NA  --  139   POTASSIUM 4.0 4.0   CHLORIDE  --  105   JESSICA  --  8.9   CO2  --  28   BUN  --  16   CR  --  0.96   GLC  --  162*     CBC  Recent Labs   Lab 10/07/21  2125   WBC 7.8   RBC 5.21   HGB 16.2   HCT 49.3   MCV 95   MCH 31.1   MCHC 32.9   RDW 12.4        INRNo lab results found in last 7 days.    PHYSICAL EXAM:  Constitutional: healthy, alert, no acute distress and cooperative but disinhibited   Cardiovascular: RRR  Respiratory: CTAB anteriorly, breathing unlabored without secondary muscle use  Psychiatric: mentation appears normal and affect normal/bright  Neck: no asymmetry  GI/Abdomen: +BS, abdomen soft, non-tender. No masses, no CVAT  MSK: able to move all extremities without weakness or ataxia  Extremities: no open lesions, extremities warm and well perfused   Neuro: tongue midline, no facial drooping, 5/5 strength bilaterally upper and lower extremities, no sensory loss    IMAGING:                "                                                     IMPRESSION:   HEAD CTA:   1.  Left proximal to mid M2 segments superior division and mid division are occluded with reconstitution. Left MCA collaterals good.  2.  Left ICA at the skull base demonstrates long segment moderate stenosis described above. Left ICA at the skull base demonstrates mildly diminished enhancement suggesting sluggish flow.  3.  Otherwise, no significant stenosis/occlusion.     NECK CTA:  1.  Left proximal carotid bulb greater than 90% stenosis. Left mid to distal carotid bulb occlusion or near occlusion with reconstitution. Left cervical ICA is significantly diminutive with long segment moderate to severe stenosis. (NASCET criteria   cannot be calculated). Left cervical ICA long segment moderate to severe stenosis may be secondary to the proximal occlusion or near occlusion versus superimposed nonocclusive dissection.  2.  Atherosclerotic plaque results in less than 50% stenosis in the right bulb.  3.  Bilateral extradural vertebral arteries demonstrate no significant stenosis/occlusion or dissection    Patient Active Problem List   Diagnosis     Acute embolic stroke (H)       ASSESSMENT:  66 year old year old male who has a PMH significant for Afib on Xarelto, HTN, HLD, DM2, GERD presented after fall after getting out of shower on 10/6.  Found to have left acute/subacute MCA stroke and left ICA stenosis greater than 90% concerning for symptomatic carotid artery stenosis. Echo pending to evaluate cardiac source for underlying Afib and non-compliance w/ Xarelto.    PLAN:  He continues to exhibit frontal lobe behavior, will re-examine  Tenatively boarded for L CEA tomorrow at 730am  Diet as tolerated per SLP, NPO after midnight  ASA/statin, continue Plavix; hold Xarelto (Ok to give morning doses of ASA/plavix tomorrow); ok for heparin  Echo study ordered    Seen and examined with Dr. Gael Shi MD  Vascular Surgery Fellow  Pager:  [phone]    Vascular surgery attending staff note: I have seen and examined the patient myself.  I reviewed the imaging.  I have discussed the case with our fellow and I agree with the documentation by Dr. Shi.   Patient is a 66-year-old male who has presented with left hemispheric embolic cerebrovascular accidents with a high-grade stenosis of the left cervical internal carotid artery.  I have also had the opportunity to discuss the case with Dr. Holden in neurology.  We believe that the stroke has been caused by the high-grade stenosis of the left cervical internal carotid artery.  Patient will benefit from left carotid endarterectomy for prevention of future disabling strokes.  We will proceed with left carotid endarterectomy tomorrow, 9 October 2021.  This was explained to the patient.    ANÍBAL IBARRA M.D.

## 2021-10-08 NOTE — ED NOTES
Bed: ED15  Expected date: 10/8/21  Expected time: 12:38 AM  Means of arrival:   Comments:  Hold for ED 30

## 2021-10-08 NOTE — PROGRESS NOTES
[facility]    HOSPITALIST PROGRESS NOTE :   --------------------------------------------------    Date of Admission:  10/7/2021    Cumulative Summary: Reggie Roper is a 66 year old male with past medical history significant for essential hypertension, hyperlipidemia, type 2 diabetes mellitus, coronary artery disease, paroxysmal atrial fibrillation anticoagulated with Xarelto who presented to the ED on October 7, 2021 due to recurrent presyncopal episode for 1 day associated with 1 fall on October 6.  Patient also noticed associated right hand weakness and disorientation while at work.  Later his family was concerned about his confusion and word finding difficulty and eventually patient was brought to the ED by his ex-wife.  Patient was found to have acute ischemic a stroke of multifocal small late acute/early subacute infarcts and was admitted for further evaluation and management.    Assessment & Plan     Acute ischemic stroke (H) (10/7/2021):   Patient is a 66-year-old female with past medical history significant for essential hypertension, hyperlipidemia, type 2 diabetes, coronary artery disease and paroxysmal atrial fibrillation, reporting compliance with Xarelto, probably missed 1 dose per report who presented to the ED October 7 due to recurrent presyncopal episodes, when associated with right hand weakness and disorientation while at work.  EMS was contacted but did not bring patient to the ED.  Later his daughter tried calling him and was not able to contact so called police for welfare check.  Was feeling well at the time of welfare check.  Later he went to the clinic and CT scan was ordered and he was dilated to [facility] to get the scan however he was unable to find his way so he went home instead.  Eventually he was unable to communicate appropriately to family via text so his ex-wife brought him to the emergency room.  Imaging showed left MCA acute/subacute infarct  with near complete left carotid occlusion.  Patient was not a candidate for TNKase.  EKG on admission showed sinus bradycardia no acute changes.  Bilateral carotid ultrasound showed severe restenosis of distal left common carotid artery and carotid bulb.    --Patient is admitted to the neuro floor with telemetry.  --Start patient on general diet, will make patient n.p.o. after midnight.  --Patient has also been evaluated by vascular surgery, planning for left carotid endarterectomy tomorrow at 7:30 AM.  --Check neuro check and vital signs every 2 hours.  --Neurology is recommending permissive hypertension with keeping systolic blood pressure less than 220.  --We will follow up on neurology recommendation regarding starting antiplatelet therapy as currently his Xarelto has been held due to the pending surgery.  Patient probably can be started on aspirin and Plavix in the meantime but will let neurology decide.  --Echocardiogram with bubble study has been ordered stat by vascular surgery.  -- Patient will be monitored on telemetry during the hospitalization  --Physical, occupational and speech therapy has been ordered.    Atrial fibrillation:   Mild coronary artery disease on CT angiogram in 2014:  Essential hypertension:  Hyperlipidemia:  His home medications include atorvastatin 40 mg p.o. daily, Bystolic 10 mg p.o. daily, rivaroxaban 20 mg p.o. daily, Tekturna 150 mg p.o. daily.    --We will hold oral antihypertensive medications at this time for permissive hypertension.  --Atorvastatin is a started at 40 mg daily, will check fasting lipid profile for tomorrow, will follow up on neurology recommendation they would like to increase Lipitor to 80 mg p.o. daily.    Noninsulin-dependent type 2 diabetes mellitus: Home medications include Metformin 2000 mg p.o. daily.    --Hemoglobin A1c has been checked, which came elevated around 8.2.  --We will go ahead and start patient on medium dose sliding scale insulin and meal  coverage with 1 unit of NovoLog for 10 g of carb.  --We will discuss with patient that probably he will need addition of another oral hypoglycemic agent.    GERD  [needs rec- omeprazole 20 mg daily]  -- resume with rec    Clinically Significant Risk Factors Present on Admission             # Coagulation Defect: home medication list includes an anticoagulant medication       Diet: Regular Diet Adult  NPO per Anesthesia Guidelines for Procedure/Surgery Except for: Meds starting at midnight   Rodríguez Catheter: Not present  DVT Prophylaxis: Xarelto on hold , continue compression stockings   Code Status: Full Code    The patient's care was discussed with the Bedside Nurse and Patient.    Disposition Plan   Expected discharge: 10/11/2021, expecting patient to stay more than 2 nights in the hospital.  More than 35 minutes of the time was a spent in patient care, more than 50% of the time was spent in counseling and coordination of the care.    Megan Yung MD, [facility]  Text Page (7am - 6pm)    ----------------------------------------------------------------------------------------------------------------------    Interval History   Patient care was assumed this morning, patient was seen and examined.  Patient is sitting in bed.  Currently patient does not have any motor or sensory deficit other than some word finding difficulties on neuro examination.  Reviewed with him regarding his MRI findings: Patient is aware about plan for surgery tomorrow morning.  Currently patient lives at home alone but his ex-wife lives in the [facility] area along with his daughter who lives in [place].  His other daughter is also coming to the town tomorrow evening.  Patient is otherwise denying any chest pain, palpitations or shortness of breath.  Discussed with patient regarding getting echocardiogram.  All the questions were answered.    -Data reviewed today: I reviewed all new labs and imaging results over the last 24 hours.    I personally  reviewed no images or EKG's today.    Physical Exam   Temp: 97.5  F (36.4  C) Temp src: Axillary BP: (!) 145/73 Pulse: 59   Resp: 14 SpO2: 95 % O2 Device: None (Room air)    Vitals:    10/07/21 2108   Weight: 103.9 kg (229 lb)     Vital Signs with Ranges  Temp:  [97.1  F (36.2  C)-97.5  F (36.4  C)] 97.5  F (36.4  C)  Pulse:  [49-78] 59  Resp:  [14-28] 14  BP: (125-173)/() 145/73  SpO2:  [92 %-100 %] 95 %  No intake/output data recorded.    GENERAL: Alert , awake and oriented. NAD. Conversational, appropriate.   HEENT: Normocephalic. EOMI. No icterus or injection. Nares normal.   LUNGS: Clear to auscultation. No dyspnea at rest.   HEART: Regular rate. Extremities perfused.   ABDOMEN: Soft, nontender, and nondistended. Positive bowel sounds.   EXTREMITIES: No LE edema noted.   NEUROLOGIC: Moves extremities x4 on command. No acute focal neurologic abnormalities noted.     Medications     - MEDICATION INSTRUCTIONS -       sodium chloride 100 mL/hr at 10/08/21 0949       [START ON 10/9/2021] aspirin  81 mg Oral Daily     [START ON 10/9/2021] atorvastatin  80 mg Oral or NG Tube Daily     [START ON 10/9/2021] clopidogrel  75 mg Oral Daily     insulin aspart   Subcutaneous TID w/meals     insulin aspart  1-10 Units Subcutaneous TID AC     insulin aspart  1-7 Units Subcutaneous At Bedtime     sodium chloride 0.9 %  90 mL Intravenous Once     senna-docusate  1-2 tablet Oral or NG Tube BID     sodium chloride (PF)  3 mL Intracatheter Q8H       Data   Recent Labs   Lab 10/08/21  0257 10/07/21  2125   WBC  --  7.8   HGB  --  16.2   MCV  --  95   PLT  --  194   NA  --  139   POTASSIUM 4.0 4.0   CHLORIDE  --  105   CO2  --  28   BUN  --  16   CR  --  0.96   ANIONGAP  --  6   JESSICA  --  8.9   GLC  --  162*   ALBUMIN  --  4.1   PROTTOTAL  --  8.1   BILITOTAL  --  0.9   ALKPHOS  --  120   ALT  --  64   AST  --  29   TROPONIN  --  <0.015       Imaging:   Recent Results (from the past 24 hour(s))   MR Brain w/o & w Contrast     Narrative    EXAM: MR BRAIN W/O and W CONTRAST  LOCATION: [facility]  DATE/TIME: 10/7/2021 10:18 PM    INDICATION: Confusion and slurred speech  COMPARISON: 09/27/2013  CONTRAST: 10 mL Gadavist  TECHNIQUE: Routine multiplanar multisequence head MRI without and with intravenous contrast.    FINDINGS:  INTRACRANIAL CONTENTS: Multiple foci of diffusion restriction in the left insula and external capsule, the left frontal lobe white matter, and the centrum semiovale. The largest areas of diffusion restriction in the left centrum semiovale and left insula   and subinsular white matter measure 20 x 12 mm and 10 x 15 mm respectively. There is mild associated FLAIR hyperintensity, and findings are likely late acute to early subacute in age. No mass, acute hemorrhage, or extra-axial fluid collections.   Scattered nonspecific T2/FLAIR hyperintensities within the cerebral white matter most consistent with mild chronic microvascular ischemic change. Mild generalized cerebral atrophy. No hydrocephalus. Normal position of the cerebellar tonsils. No   pathologic contrast enhancement.    SELLA: No abnormality accounting for technique.    OSSEOUS STRUCTURES/SOFT TISSUES: Normal marrow signal. The major intracranial vascular flow voids are maintained.     ORBITS: Prior bilateral cataract surgery. Visualized portions of the orbits are otherwise unremarkable.     SINUSES/MASTOIDS: Mucosal thickening primarily involving the ethmoid air cells. Question prior left mastoidectomy with scattered opacification of the bilateral mastoid air cells.       Impression    IMPRESSION:  1.  Multifocal small late acute to early subacute infarcts in the left MCA territory involving the left insula and subinsular white matter, the left frontal lobe white matter, and the left centrum semiovale.  2.  Mild generalized parenchymal volume loss and sequela of chronic microvascular ischemic disease.    Findings were discussed with   Luisajohnsonellie at 2312 hours on 10/07/2021.       Head CT w/o contrast    Narrative    EXAM: CT HEAD W/O CONTRAST  LOCATION: [facility]  DATE/TIME: 10/8/2021 12:58 AM    INDICATION: Neuro deficit, acute, stroke suspected, slurred speech, left facial droop, confusion   COMPARISON: MRI brain 10/07/2021  TECHNIQUE: Routine CT Head without IV contrast. Multiplanar reformats. Dose reduction techniques were used.    FINDINGS:  INTRACRANIAL CONTENTS: No acute intracranial hemorrhage, extraaxial collection, or mass effect.  Left frontal lobe subcortical white matter, left mid insula, and left lentiform nucleus demonstrate patchy acute infarcts when correlated with MRI brain   10/07/2021. Left circular sulcus M2 segment hyperdense artery sign. Mild presumed chronic small vessel ischemic changes. Normal ventricles and sulci.     VISUALIZED ORBITS/SINUSES/MASTOIDS: No intraorbital abnormality. No paranasal sinus mucosal disease. Right canal wall down mastoidectomy. Left mastoid air cells essentially clear.    BONES/SOFT TISSUES: No acute abnormality.      Impression    IMPRESSION:  1.  Left MCA territory small patchy acute infarcts described above redemonstrated from MRI brain 10/07/2021.  2.  No acute intracranial hemorrhage.  3.  No midline shift.  4.  Age-related changes described above.   CTA Head Neck with Contrast    Narrative    EXAM: CTA  HEAD NECK WITH CONTRAST  LOCATION: [facility]  DATE/TIME: 10/8/2021 12:59 AM    INDICATION: Stroke/TIA, assess intracranial arteries. Slurred speech. Left facial droop. Confusion.  COMPARISON: None.  CONTRAST: 70mL Isovue-[id number]  TECHNIQUE: Head and neck CT angiogram with IV contrast. Axial helical CT images of the head and neck vessels obtained during the arterial phase of intravenous contrast administration. Axial 2D reconstructed images and multiplanar 3D MIP reconstructed   images of the head and neck vessels were performed by the  technologist. Dose reduction techniques were used. All stenosis measurements made according to NASCET criteria unless otherwise specified.    FINDINGS:   HEAD CTA:  Left vertical and horizontal petrous ICA and left precavernous ICA moderate long segment stenosis. Left carotid siphon and left supraclinoid ICA long segment moderate stenosis. Left ICA at the skull base demonstrates mildly diminished enhancement   suggesting sluggish flow.    Left proximal to mid M2 segments superior division and mid division are occluded with reconstitution. Left MCA collaterals good.    Otherwise, no significant stenosis/occlusion. No brain aneurysm. No AVM/AVF.    Dominant right and smaller left vertebral artery contribute to a normal basilar artery.     DURAL VENOUS SINUSES: Not well evaluated on a technical basis.      NECK CTA:  RIGHT CAROTID: Atherosclerotic plaque results in less than 50% stenosis in the right bulb. No dissection.    LEFT CAROTID: Left proximal carotid bulb greater than 90% stenosis. Left mid to distal carotid bulb occlusion or near occlusion with reconstitution. Left cervical ICA is significantly diminutive with long segment moderate to severe stenosis. (NASCET   criteria cannot be calculated). Left cervical ICA long segment moderate to severe stenosis may be secondary to the proximal occlusion or near occlusion versus superimposed nonocclusive dissection.    VERTEBRAL ARTERIES:  No significant stenosis/occlusion. No dissection.      Dominant right and smaller left vertebral arteries.    AORTIC ARCH: Classic aortic arch anatomy with no significant stenosis at the origin of the great vessels.    ARTERIAL PLAQUE: Calcified/noncalcified plaque bilateral carotid bifurcations/bulbs and bilateral carotid siphons.    NONVASCULAR STRUCTURES:   Dental amalgam resulting in streak artifact. Degenerative changes noted within the spine. Right canal wall down mastoidectomy.          Impression     IMPRESSION:   HEAD CTA:    1.  Left proximal to mid M2 segments superior division and mid division are occluded with reconstitution. Left MCA collaterals good.  2.  Left ICA at the skull base demonstrates long segment moderate stenosis described above. Left ICA at the skull base demonstrates mildly diminished enhancement suggesting sluggish flow.  3.  Otherwise, no significant stenosis/occlusion.    NECK CTA:  1.  Left proximal carotid bulb greater than 90% stenosis. Left mid to distal carotid bulb occlusion or near occlusion with reconstitution. Left cervical ICA is significantly diminutive with long segment moderate to severe stenosis. (NASCET criteria   cannot be calculated). Left cervical ICA long segment moderate to severe stenosis may be secondary to the proximal occlusion or near occlusion versus superimposed nonocclusive dissection.  2.  Atherosclerotic plaque results in less than 50% stenosis in the right bulb.  3.  Bilateral extradural vertebral arteries demonstrate no significant stenosis/occlusion or dissection.   US Carotid Bilateral    Narrative    PRELIMINARY REPORT - Final read in the a.m.    ULTRASOUND CAROTID BILATERAL    VELOCITY CHART:   The following velocities were obtained in the RIGHT carotid system.  CCA: 92/25 cm/s  ICA: 134/47 cm/s  ECA: 115/14 cm/s  ICA/CCA: PS 1.3    The following velocities were obtained in the LEFT carotid system.  CCA: 63/8 cm/s  ICA: 82/10 cm/s  ECA: 138/16 cm/s  ICA/CCA: PS 1.3        Impression    IMPRESSION:    1. Severe stenosis of the distal left common carotid artery/carotid bulb.  2. No significant stenosis in the right carotid artery.    Preliminary Interpretation Dictated By: Marcio Mai MD  Date: 10/8/2021 2:58 AM   Echocardiogram Complete with Bubble Study    Narrative    [id number]  [id number]  [id number]  [id number]^CONSUELO^[facility]  Echocardiography Laboratory  [address] [place], [place] [address]     Name: BONNIE SNEED LUX  MRN: [id number]  :  1955  Study Date: 10/08/2021 08:27 AM  Age: 66 yrs  Gender: Male  Patient Location[facility]  Reason For Study: Cardiac Thrombus  Ordering Physician: NICOL CORDERO  Performed By: Ophelia Dior     BSA: 2.2 m2  Height: 70 in  Weight: 229 lb  BP: 173/91 mmHg  ______________________________________________________________________________  Procedure  Complete Portable Bubble Echo Adult. Optison (NDC #8326-1314) given  intravenously.  ______________________________________________________________________________  Interpretation Summary     This TTE echo is nearly unreadable due to lung interference. No obvious clot  seen but a KESHA would be needed to exclude a cardiac source of embolism  LV not well seen, grossly normal but limited views  ______________________________________________________________________________  Left Ventricle  The left ventricle is normal in size. There is normal left ventricular wall  thickness. LV not well seen, grossly normal but limited views. Left  ventricular diastolic function is indeterminate. Regional wall motion  abnormalities cannot be excluded due to limited visualization.     Right Ventricle  The right ventricle is not well visualized.     Atria  The left atrium is mildly dilated. Right atrium not well visualized.     Mitral Valve  The mitral valve is not well visualized.     Tricuspid Valve  The tricuspid valve is not well visualized.     Aortic Valve  No hemodynamically significant valvular aortic stenosis.     Pulmonic Valve  The pulmonic valve is not well visualized.     Vessels  The aortic root is normal size.     Pericardium  The pericardium appears normal.     Rhythm  Sinus rhythm was noted.  ______________________________________________________________________________  MMode/2D Measurements & Calculations  IVSd: 0.74 cm  LVIDd: 5.4 cm  LVIDs: 3.1 cm  LVPWd: 0.91 cm  FS: 43.0 %  LV mass(C)d: 161.7 grams  LV mass(C)dI: 73.1 grams/m2  Ao root diam: 3.6 cm  LA dimension: 3.9  cm  asc Aorta Diam: 3.2 cm  LA/Ao: 1.1  RWT: 0.34     Doppler Measurements & Calculations  MV E max arin: 65.5 cm/sec  MV A max arin: 50.4 cm/sec  MV E/A: 1.3  MV dec slope: 242.5 cm/sec2  E/E' av.2  Lateral E/e': 6.3  Medial E/e': 6.1     ______________________________________________________________________________  Report approved by: Naya Monk 10/08/2021 09:59 AM

## 2021-10-08 NOTE — PLAN OF CARE
Arrived from ED this am.  Here with LMCA stroke, left ICA stenosis. Neuros stable.  Word-finding difficulty improved, slight right facial droop x 1 assessment at 1200.  Denies pain.  Tele NSR.  Up with SBA to bathroom.  Regular diet.  Blood glucose covered with sliding scale insulin. Plan for left CEA 10/9 at 0730.

## 2021-10-08 NOTE — ED NOTES
[facility]  ED Nurse Handoff Report    ED Chief complaint: Altered Mental Status      ED Diagnosis:   Final diagnoses:   Acute embolic stroke (H)       Code Status: Full Code    Allergies: No Known Allergies    Patient Story: Dropped to floor at 630pm yesterday when getting ready for work.  Confused, slurred speech.  Called EMS.  Vitals normal, EMS left.  Drove home.  Called daughter left side of face drooping, trouble with using hands.   Went to bed.  [place] PD checked on him at 9am.  Went to work.  Texting weird messages today.  Called him tonight, confused & lost when driving on the highway.       Report from ex-wife who is with patient.    (Daughter is who was in communication & attempting to get ahold of patient since yesterday.  Daughter lives in [place]  Focused Assessment:  Confusion off/on    Treatments and/or interventions provided: see MAR  Patient's response to treatments and/or interventions:     To be done/followed up on inpatient unit:      Does this patient have any cognitive concerns?: Forgetful    Activity level - Baseline/Home:  Independent  Activity Level - Current:   Stand with Assist    Patient's Preferred language: English   Needed?: No    Isolation: None  Infection: Not Applicable  Patient tested for COVID 19 prior to admission: YES  Bariatric?: No    Vital Signs:   Vitals:    10/07/21 2130 10/07/21 2303 10/07/21 2304 10/08/21 0000   BP: (!) 145/79 (!) 146/68  130/66   Pulse: 52 78  53   Resp: 28      Temp:       TempSrc:       SpO2: 98%  96% 96%   Weight:       Height:           Cardiac Rhythm:Cardiac Rhythm: Normal sinus rhythm    Was the PSS-3 completed:   Yes  What interventions are required if any?               Family Comments:   OBS brochure/video discussed/provided to patient/family: Yes              Name of person given brochure if not patient:               Relationship to patient:     For the majority of the shift this patient's behavior was Green.    Behavioral interventions performed were .    ED NURSE PHONE NUMBER: [phone]

## 2021-10-08 NOTE — PROGRESS NOTES
10/08/21 1445   Quick Adds   Type of Visit Initial PT Evaluation   Living Environment   People in home alone   Current Living Arrangements apartment  (2nd level apartment )   Home Accessibility stairs to enter home   Number of Stairs, Main Entrance greater than 10 stairs   Stair Railings, Main Entrance railings safe and in good condition   Transportation Anticipated family or friend will provide   Living Environment Comments lives in apt alone, does not have any devices    Self-Care   Usual Activity Tolerance good   Current Activity Tolerance moderate   Regular Exercise No   Equipment Currently Used at Home none   Activity/Exercise/Self-Care Comment Independent with all self-cares, mobility, works full-time at [employer] night shifts 7:30 pm-7:30am   Disability/Function   Fall history within last six months no   General Information   Onset of Illness/Injury or Date of Surgery 10/07/21   Referring Physician Connor Craig MD   Patient/Family Therapy Goals Statement (PT) return home, get surgery tomorrow    Pertinent History of Current Problem (include personal factors and/or comorbidities that impact the POC)  66 year old male with past medical history significant for essential hypertension, hyperlipidemia, type 2 diabetes mellitus, coronary artery disease, paroxysmal atrial fibrillation anticoagulated with Xarelto who presented to the ED on October 7, 2021 due to recurrent presyncopal episode for 1 day associated with 1 fall on October 6.  Patient also noticed associated right hand weakness and disorientation while at work.  Later his family was concerned about his confusion and word finding difficulty and eventually patient was brought to the ED by his ex-wife.  Patient was found to have acute ischemic a stroke of multifocal small late acute/early subacute infarcts and was admitted for further evaluation and management.   Cognition   Orientation Status (Cognition) oriented x 4   Affect/Mental  Status (Cognition) WFL   Follows Commands (Cognition) WFL   Behavioral Issues other (see comments)  (appears unconcerned about seriousness of condition )   Pain Assessment   Patient Currently in Pain No   Bed Mobility   Comment (Bed Mobility) SBA bed mobility, appears somewhat lethargic/quick moving and impulsive    Transfers   Transfer Safety Comments SBA sit <> stand without device    Gait/Stairs (Locomotion)   [place] Level (Gait) contact guard   Assistive Device (Gait)   (none)   Distance in Feet (Required for LE Total Joints) 100'   Comment (Gait/Stairs) wide REVA, mild path deviation, no device needed    Sensory Examination   Sensory Perception Comments denies sensation changes to right side or otherwise    Clinical Impression   Criteria for Skilled Therapeutic Intervention yes, treatment indicated   PT Diagnosis (PT) impaired mobility    Influenced by the following impairments impaired balance   Functional limitations due to impairments decreased activity tolerance, fall risk    Clinical Presentation Stable/Uncomplicated   Clinical Presentation Rationale clinical judgement    Clinical Decision Making (Complexity) low complexity   Therapy Frequency (PT) Daily   Predicted Duration of Therapy Intervention (days/wks) 5 days    Planned Therapy Interventions (PT) transfer training;strengthening;stair training;gait training;neuromuscular re-education;bed mobility training;balance training   Anticipated Equipment Needs at Discharge (PT)   (none pending progress )   Risk & Benefits of therapy have been explained evaluation/treatment results reviewed;care plan/treatment goals reviewed;risks/benefits reviewed;current/potential barriers reviewed;participants voiced agreement with care plan;participants included;patient   PT Discharge Planning    PT Discharge Recommendation (DC Rec) home   PT Rationale for DC Rec Patient appears close to baseline in terms of functional mobility, does endorse very mild R hand  clumsiness however able to grasp and use all objects (is left handed at baseline), Patient ambulating > 100 feet without device and other mobility SBA. Will continues to assess s/p left carotid endarterectomy planned for 10/9 AM. Anticipate patient will be able to d/c home with intermittent family check-ins as needed.    PT Brief overview of current status  Close SBA with all mobility    Total Evaluation Time   Total Evaluation Time (Minutes) 15

## 2021-10-08 NOTE — ED PROVIDER NOTES
History     Chief Complaint:  Altered Mental Status       HPI   Reggie Roper is a 66 year old male who presents with concerns for an acute stroke.  The timeline I am able to ascertain describes the patient having a near syncopal or syncopal spell after taking a shower approximately 30 hours ago.  After this, he seemed to be somewhat disoriented, but symptoms mostly resolved.  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  However, while he was there, he had another spell where he felt disoriented and had a difficult time using his right hand.  Paramedics were called, though after an assessment, he was not transported to the hospital though this is not exactly clear as to why.  He did not feel well enough to stay at work and went home.  When he did not answer his phone this morning, his daughter requested police to perform a welfare check, and once they got access to his apartment, found him sleeping.  However, he was feeling fairly well at that point and explained that he was planning to go see his doctor today.  He recalls seeing someone about his symptoms and recalls that they had ordered a scan of his head and had recommended him to go to [facility].  However, when he left this clinic and attempted to drive to the hospital, he could not find his way and eventually aborted the mission, deciding to go home.  When he was trying to text with his daughter and converse with her, family became concerned that this was something more serious and his ex-wife brought him to us for evaluation.    At the time of my assessment, the patient states that he feels normal.  However, his ex-wife notes that he continues to change his story and cannot exactly recall most of the events of today.  He also thought that he was in a different city and missed the date at the time of triage though he knows it now.  He denies headache.  He denies focal weakness or numbness.  He states that his right hand  "was not working last night though he states it seems to be working fine now.  He believes that he has been taking all of his medications as directed, though he is not sure whether he took them this morning.    Allergies:  No Known Allergies     Medications:    atorvastatin (LIPITOR) 40 MG tablet  BYSTOLIC 10 mg tablet  metFORMIN (GLUCOPHAGE) 500 MG tablet  omeprazole (PRILOSEC) 20 MG capsule  rivaroxaban ANTICOAGULANT (XARELTO) 20 MG TABS tablet  TEKTURNA 150 mg tablet        Past Medical History:    Past Medical History:   Diagnosis Date     Abnormal stress test      Chest pain      Diabetes mellitus (H)      Hyperlipidemia      Hypertension      Shortness of breath on exertion        Patient Active Problem List    Diagnosis Date Noted     Acute embolic stroke (H) 10/07/2021     Priority: Medium        Past Surgical History:    Past Surgical History:   Procedure Laterality Date     CATARACT EXTRACTION          Family History:    family history includes CABG (age of onset: 78.00) in his father; Heart Disease in his father.    Social History:   reports that he has quit smoking. His smoking use included cigars and cigars. He has never used smokeless tobacco. He reports current alcohol use. He reports that he does not use drugs.    PCP: Simin Sanderson     Review of Systems  Pertinent positives and negatives as above.  A 14-point review of systems was performed with all other systems reviewed as negative.      Physical Exam     Patient Vitals for the past 24 hrs:   BP Temp Temp src Pulse Resp SpO2 Height Weight   10/08/21 0000 130/66 -- -- 53 -- 96 % -- --   10/07/21 2304 -- -- -- -- -- 96 % -- --   10/07/21 2303 (!) 146/68 -- -- 78 -- -- -- --   10/07/21 2130 (!) 145/79 -- -- 52 28 98 % -- --   10/07/21 2108 (!) 165/80 97.1  F (36.2  C) Temporal 60 16 99 % 1.778 m (5' 10\") 103.9 kg (229 lb)        Physical Exam  Eye:  Pupils are equal, round, and reactive.  Extraocular movements intact.    ENT:  No " rhinorrhea.  Moist mucus membranes.  Normal tongue and tonsil.    Cardiac:  Regular rate and rhythm.  No murmurs, gallops, or rubs.    Pulmonary:  Clear to auscultation bilaterally.  No wheezes, rales, or rhonchi.    Abdomen:  Positive bowel sounds.  Abdomen is soft and non-distended, without focal tenderness.    Musculoskeletal:  Normal movement of all extremities without evidence for deficit.    Skin:  Warm and dry without rashes.    Neurologic:  CN II - XII intact.  5/5 strength in all extremities.  Normal sensation throughout.  Normal finger to nose and heel to shin.  2+ patellar reflexes.  Normal gait.    Psychiatric:  Normal affect with appropriate interaction with examiner.    Emergency Department Course     Imaging:  MR Brain w/o & w Contrast   Final Result   IMPRESSION:   1.  Multifocal small late acute to early subacute infarcts in the left MCA territory involving the left insula and subinsular white matter, the left frontal lobe white matter, and the left centrum semiovale.   2.  Mild generalized parenchymal volume loss and sequela of chronic microvascular ischemic disease.      Findings were discussed with Dr. Trierweiler at 2312 hours on 10/07/2021.           Laboratory:  Labs Ordered and Resulted from Time of ED Arrival Up to the Time of Departure from the ED   COMPREHENSIVE METABOLIC PANEL - Abnormal; Notable for the following components:       Result Value    Glucose 162 (*)     All other components within normal limits   ROUTINE UA WITH MICROSCOPIC REFLEX TO CULTURE - Abnormal; Notable for the following components:    Glucose Urine >=1000 (*)     Mucus Urine Present (*)     All other components within normal limits    Narrative:     Urine Culture not indicated   TROPONIN I - Normal   TSH WITH FREE T4 REFLEX - Normal   ETHYL ALCOHOL LEVEL - Normal   COVID-19 VIRUS (CORONAVIRUS) BY PCR - Normal    Narrative:     Testing was performed using the rosa  SARS-CoV-2 & Influenza A/B Assay on the rosa   Rachel  System.  This test should be ordered for the detection of SARS-COV-2 in individuals who meet SARS-CoV-2 clinical and/or epidemiological criteria. Test performance is unknown in asymptomatic patients.  This test is for in vitro diagnostic use under the FDA EUA for laboratories certified under CLIA to perform moderate and/or high complexity testing. This test has not been FDA cleared or approved.  A negative test does not rule out the presence of PCR inhibitors in the specimen or target RNA in concentration below the limit of detection for the assay. The possibility of a false negative should be considered if the patient's recent exposure or clinical presentation suggests COVID-19.  [facility] are certified under the Clinical Laboratory Improvement Amendments of 1988 (CLIA-88) as qualified to perform moderate and/or high complexity laboratory testing.   EXTRA BLUE TOP TUBE   EXTRA RED TOP TUBE   EXTRA GREEN TOP (LITHIUM HEPARIN) TUBE   EXTRA PURPLE TOP TUBE   EXTRA BLOOD BANK PURPLE TOP TUBE   CBC WITH PLATELETS AND DIFFERENTIAL   PULSE OXIMETRY NURSING   CARDIAC CONTINUOUS MONITORING   PERIPHERAL IV CATHETER   EXTRA TUBE    Narrative:     The following orders were created for panel order [facility] Draw.                  Procedure                               Abnormality         Status                                     ---------                               -----------         ------                                     Extra Blue Top Tube[[id number]]                              Final result                               Extra Red Top Tube[[id number]]                               Final result                               Extra Green Top (Lithium...[[id number]]                      Final result                               Extra Purple Top Tube[[id number]]                            Final result                               Extra Blood Bank Purple ...[[id number]]                      Final  result                                                 Please view results for these tests on the individual orders.   CBC WITH PLATELETS & DIFFERENTIAL    Narrative:     The following orders were created for panel order CBC with platelets differential.                  Procedure                               Abnormality         Status                                     ---------                               -----------         ------                                     CBC with platelets and d...[[id number]]                      Final result                                                 Please view results for these tests on the individual orders.      Procedures:  EKG: Sinus bradycardia with a rate of 51.  Normal axis and normal interval.  No ST elevation, depression, or T wave inversion concerning for ischemia.    Interventions:  Medications   sodium chloride (PF) 0.9% PF flush 3 mL (has no administration in time range)   sodium chloride (PF) 0.9% PF flush 3 mL (has no administration in time range)   Saline flush (has no administration in time range)   gadobutrol (GADAVIST) injection 10 mL (10 mLs Intravenous Given 10/7/21 2229)   sodium chloride (PF) 0.9% PF flush 10 mL (10 mLs Intravenous Given 10/7/21 2229)   iopamidol (ISOVUE-370) solution 70 mL (70 mLs Intravenous Given 10/8/21 0059)        Emergency Department Course:  Past medical records, nursing notes, and vitals reviewed.  I performed an exam of the patient and obtained history, as documented above.  I personally spoke with the neuroradiologist.  I personally spoke with Dr. Pozo of stroke neurology for recommendations.  I spoke with Dr. Craig of the hospitalist service for admission.  I rechecked the patient. Findings and plan explained to the Patient and ex-wife. Patient was admitted.    Impression & Plan      Medical Decision Making:  This delightful 66-year-old gentleman with a history of atrial fibrillation on anticoagulation presents to us  because of concerning events of near syncope, confusion, clumsiness of the right hand, and difficulties with memory.  I can see in our electronic medical record that he had a visit with a medical professional today, though due to this clinics lack of use of epic, I am unable to see what was discussed, what studies were obtained, and what information was passed on.  Therefore, I elected to obtain an MRI of the brain to definitively rule in or rule out stroke.  Unfortunately, this shows several small subacute infarcts in the left MCA area.  I verified with the patient that he has been taking his anticoagulant and he claims that he has.  Therefore it is unclear whether these embolic phenomenon are due to a failure of the anticoagulation or whether these are bits of plaque rupturing from more proximal in the vasculature.  He is well outside of any window for intervention and his NIH stroke scale is low, making him a poor candidate for large vessel intervention.  After speaking with our stroke neurology team, we will obtain imaging of his vasculature and this will be followed up with the admitting team.    I spoke with the patient about his diagnosis and about the need for admission and further work-up.  His questions were answered he is comfortable with this plan as is the hospitalist service.    Diagnosis:    ICD-10-CM    1. Acute embolic stroke (H)  I63.9         10/8/2021   Trierweiler, Chad A, MD Trierweiler, Chad A, MD  10/08/21 0109

## 2021-10-08 NOTE — PHARMACY-ADMISSION MEDICATION HISTORY
"Pharmacy Medication History  Admission medication history interview status for the 10/7/2021  admission is complete. See EPIC admission navigator for prior to admission medications     Location of Interview: Patient room  Medication history sources: Patient, Surescripts, Patient's home med list and Care Everywhere    Significant changes made to the medication list:  Adjusted metformin 2000 mg daily --> 1500 mg daily  Added sitagliptin     In the past week, patient estimated taking medication this percent of the time: greater than 90%    Additional medication history information:   The following prescriptions show recent fill history, but patient not familiar with/states not taking, and not on handwritten home med list:   -dapagliflozin 10 mg (filled 9/22/21 #90ds, patient states \"not ringing any bells\" and says only taking 2 medications for blood sugar)   -metoprolol succinate 100 mg (filled 9/22/21 #90ds, patient states still taking bystolic despite no recent fill history, written med list only shows bystolic)   Patient is moderately reliable historian.     Medication reconciliation completed by provider prior to medication history? Yes    Time spent in this activity: 20 minutes    Prior to Admission medications    Medication Sig Last Dose Taking? Auth Provider   atorvastatin (LIPITOR) 40 MG tablet [ATORVASTATIN (LIPITOR) 40 MG TABLET] TAKE 1 TABLET BY MOUTH AT BEDTIME. 10/6/2021 at Unknown time Yes Jonny Bowman MD   BYSTOLIC 10 mg tablet [BYSTOLIC 10 MG TABLET] TAKE 1 TABLET BY MOUTH DAILY FOR BLOOD PRESSURE 10/6/2021 at Unknown time Yes Jonny Bowman MD   metFORMIN (GLUCOPHAGE) 500 MG tablet Take 1,500 mg by mouth daily 10/6/2021 at Unknown time Yes Unknown, Entered By History   omeprazole (PRILOSEC) 20 MG capsule [OMEPRAZOLE (PRILOSEC) 20 MG CAPSULE] Take 20 mg by mouth daily. 10/6/2021 at PM Yes Provider, Historical   rivaroxaban ANTICOAGULANT (XARELTO) 20 MG TABS tablet Take 1 tablet (20 mg) by " mouth daily (with dinner) 10/6/2021 at PM Yes Nino Salazar MD   sitagliptin (JANUVIA) 100 MG tablet Take 100 mg by mouth daily 10/6/2021 at Unknown time Yes Unknown, Entered By History   TEKTURNA 150 mg tablet [TEKTURNA 150 MG TABLET] TAKE 1 TABLET BY MOUTH ONCE DAILY FOR BLOOD PRESSURE 10/7/2021 at PM Yes Jimmy Richards MD       The information provided in this note is only as accurate as the sources available at the time of update(s)   Maria Del Carmen Roblero, AzeemD

## 2021-10-08 NOTE — PROGRESS NOTES
RECEIVING UNIT ED HANDOFF REVIEW    ED Nurse Handoff Report was reviewed by: Ivonne Lara RN on October 8, 2021 at 8:12 AM

## 2021-10-08 NOTE — ED PROVIDER NOTES
Before signout, I received the CT angiogram report, showing almost complete stenosis of the left carotid artery with very poor flow. With this, I again spoke with the stroke fellow. He recommended increasing the aspirin to a full-strength aspirin, loading him with 300 mg of Plavix, obtaining a carotid ultrasound, and keeping the patient n.p.o. The stroke team plans to round on this patient in the morning and get the vascular surgery team involved for a probable urgent endarterectomy. Otherwise, the patient continues to be stable with no change and will be monitored every 2 hours for any neuro changes. The hospitalist service was also made aware of this and the patient was signed out on the oncoming emergency physician.     Trierweiler, Chad A, MD  10/08/21 0152

## 2021-10-08 NOTE — ED TRIAGE NOTES
Dropped to floor at 630pm yesterday when getting ready for work.  Confused, slurred speech.  Called EMS.  Vitals normal, EMS left.  Drove home.  Called daughter left side of face drooping, trouble with using hands.   Went to bed.  Danuta CASTILLO checked on him at 9am.  Went to work.  Texting weird messages today.  Called him tonight, confused & lost when driving on the highway.      Report from ex-wife who is with patient.    (Daughter is who was in communication & attempting to get ahold of patient since yesterday.  Daughter lives in [place]).

## 2021-10-09 ENCOUNTER — APPOINTMENT (OUTPATIENT)
Dept: ULTRASOUND IMAGING | Facility: CLINIC | Age: 66
DRG: 039 | End: 2021-10-09
Attending: SURGERY
Payer: COMMERCIAL

## 2021-10-09 ENCOUNTER — ANESTHESIA (OUTPATIENT)
Dept: SURGERY | Facility: CLINIC | Age: 66
DRG: 039 | End: 2021-10-09
Payer: COMMERCIAL

## 2021-10-09 LAB
ANION GAP SERPL CALCULATED.3IONS-SCNC: 6 MMOL/L (ref 3–14)
BUN SERPL-MCNC: 19 MG/DL (ref 7–30)
CALCIUM SERPL-MCNC: 8.1 MG/DL (ref 8.5–10.1)
CHLORIDE BLD-SCNC: 110 MMOL/L (ref 94–109)
CO2 SERPL-SCNC: 25 MMOL/L (ref 20–32)
CREAT SERPL-MCNC: 0.87 MG/DL (ref 0.66–1.25)
ERYTHROCYTE [DISTWIDTH] IN BLOOD BY AUTOMATED COUNT: 12.6 % (ref 10–15)
GFR SERPL CREATININE-BSD FRML MDRD: 89 ML/MIN/1.73M2
GLUCOSE BLD-MCNC: 152 MG/DL (ref 70–99)
GLUCOSE BLDC GLUCOMTR-MCNC: 145 MG/DL (ref 70–99)
GLUCOSE BLDC GLUCOMTR-MCNC: 153 MG/DL (ref 70–99)
GLUCOSE BLDC GLUCOMTR-MCNC: 165 MG/DL (ref 70–99)
GLUCOSE BLDC GLUCOMTR-MCNC: 177 MG/DL (ref 70–99)
GLUCOSE BLDC GLUCOMTR-MCNC: 185 MG/DL (ref 70–99)
GLUCOSE BLDC GLUCOMTR-MCNC: 186 MG/DL (ref 70–99)
HCT VFR BLD AUTO: 44.2 % (ref 40–53)
HGB BLD-MCNC: 14.8 G/DL (ref 13.3–17.7)
MAGNESIUM SERPL-MCNC: 1.9 MG/DL (ref 1.6–2.3)
MCH RBC QN AUTO: 31.6 PG (ref 26.5–33)
MCHC RBC AUTO-ENTMCNC: 33.5 G/DL (ref 31.5–36.5)
MCV RBC AUTO: 94 FL (ref 78–100)
PLATELET # BLD AUTO: 141 10E3/UL (ref 150–450)
POTASSIUM BLD-SCNC: 4 MMOL/L (ref 3.4–5.3)
RBC # BLD AUTO: 4.69 10E6/UL (ref 4.4–5.9)
SARS-COV-2 RNA RESP QL NAA+PROBE: NEGATIVE
SODIUM SERPL-SCNC: 141 MMOL/L (ref 133–144)
WBC # BLD AUTO: 5.9 10E3/UL (ref 4–11)

## 2021-10-09 PROCEDURE — 370N000017 HC ANESTHESIA TECHNICAL FEE, PER MIN: Performed by: SURGERY

## 2021-10-09 PROCEDURE — 36415 COLL VENOUS BLD VENIPUNCTURE: CPT | Performed by: INTERNAL MEDICINE

## 2021-10-09 PROCEDURE — 35301 RECHANNELING OF ARTERY: CPT | Mod: LT | Performed by: SURGERY

## 2021-10-09 PROCEDURE — 250N000009 HC RX 250: Performed by: NURSE ANESTHETIST, CERTIFIED REGISTERED

## 2021-10-09 PROCEDURE — 250N000011 HC RX IP 250 OP 636: Performed by: SURGERY

## 2021-10-09 PROCEDURE — 250N000013 HC RX MED GY IP 250 OP 250 PS 637: Performed by: INTERNAL MEDICINE

## 2021-10-09 PROCEDURE — 922N000001 HC NEURO MONITORING SERVICE, UP TO 7 HOURS (T1FEE): Performed by: SURGERY

## 2021-10-09 PROCEDURE — 03CL0ZZ EXTIRPATION OF MATTER FROM LEFT INTERNAL CAROTID ARTERY, OPEN APPROACH: ICD-10-PCS | Performed by: SURGERY

## 2021-10-09 PROCEDURE — 272N000001 HC OR GENERAL SUPPLY STERILE: Performed by: SURGERY

## 2021-10-09 PROCEDURE — 99233 SBSQ HOSP IP/OBS HIGH 50: CPT | Performed by: INTERNAL MEDICINE

## 2021-10-09 PROCEDURE — 250N000011 HC RX IP 250 OP 636: Performed by: NURSE ANESTHETIST, CERTIFIED REGISTERED

## 2021-10-09 PROCEDURE — 360N000077 HC SURGERY LEVEL 4, PER MIN: Performed by: SURGERY

## 2021-10-09 PROCEDURE — 120N000001 HC R&B MED SURG/OB

## 2021-10-09 PROCEDURE — 250N000013 HC RX MED GY IP 250 OP 250 PS 637: Performed by: STUDENT IN AN ORGANIZED HEALTH CARE EDUCATION/TRAINING PROGRAM

## 2021-10-09 PROCEDURE — 85027 COMPLETE CBC AUTOMATED: CPT | Performed by: INTERNAL MEDICINE

## 2021-10-09 PROCEDURE — 999N000063 US INTRAOPERATIVE: Mod: TC

## 2021-10-09 PROCEDURE — 710N000010 HC RECOVERY PHASE 1, LEVEL 2, PER MIN: Performed by: SURGERY

## 2021-10-09 PROCEDURE — 250N000011 HC RX IP 250 OP 636: Performed by: STUDENT IN AN ORGANIZED HEALTH CARE EDUCATION/TRAINING PROGRAM

## 2021-10-09 PROCEDURE — 03CJ0ZZ EXTIRPATION OF MATTER FROM LEFT COMMON CAROTID ARTERY, OPEN APPROACH: ICD-10-PCS | Performed by: SURGERY

## 2021-10-09 PROCEDURE — 258N000003 HC RX IP 258 OP 636: Performed by: NURSE ANESTHETIST, CERTIFIED REGISTERED

## 2021-10-09 PROCEDURE — 272N000282 HC OR IOM SUPPLIES OPNP: Performed by: SURGERY

## 2021-10-09 PROCEDURE — 999N000141 HC STATISTIC PRE-PROCEDURE NURSING ASSESSMENT: Performed by: SURGERY

## 2021-10-09 PROCEDURE — C1763 CONN TISS, NON-HUMAN: HCPCS | Performed by: SURGERY

## 2021-10-09 PROCEDURE — 250N000011 HC RX IP 250 OP 636: Performed by: ANESTHESIOLOGY

## 2021-10-09 PROCEDURE — 83735 ASSAY OF MAGNESIUM: CPT | Performed by: SURGERY

## 2021-10-09 PROCEDURE — 250N000009 HC RX 250: Performed by: SURGERY

## 2021-10-09 PROCEDURE — 250N000025 HC SEVOFLURANE, PER MIN: Performed by: SURGERY

## 2021-10-09 PROCEDURE — 03UJ0KZ SUPPLEMENT LEFT COMMON CAROTID ARTERY WITH NONAUTOLOGOUS TISSUE SUBSTITUTE, OPEN APPROACH: ICD-10-PCS | Performed by: SURGERY

## 2021-10-09 PROCEDURE — 80048 BASIC METABOLIC PNL TOTAL CA: CPT | Performed by: INTERNAL MEDICINE

## 2021-10-09 PROCEDURE — 250N000012 HC RX MED GY IP 250 OP 636 PS 637: Performed by: STUDENT IN AN ORGANIZED HEALTH CARE EDUCATION/TRAINING PROGRAM

## 2021-10-09 PROCEDURE — 258N000003 HC RX IP 258 OP 636: Performed by: ANESTHESIOLOGY

## 2021-10-09 PROCEDURE — 99233 SBSQ HOSP IP/OBS HIGH 50: CPT | Performed by: PSYCHIATRY & NEUROLOGY

## 2021-10-09 PROCEDURE — 258N000003 HC RX IP 258 OP 636: Performed by: SURGERY

## 2021-10-09 PROCEDURE — 03CN0ZZ EXTIRPATION OF MATTER FROM LEFT EXTERNAL CAROTID ARTERY, OPEN APPROACH: ICD-10-PCS | Performed by: SURGERY

## 2021-10-09 PROCEDURE — 250N000009 HC RX 250: Performed by: PSYCHIATRY & NEUROLOGY

## 2021-10-09 PROCEDURE — 258N000003 HC RX IP 258 OP 636: Performed by: STUDENT IN AN ORGANIZED HEALTH CARE EDUCATION/TRAINING PROGRAM

## 2021-10-09 DEVICE — DECELLULARIZED BOVINE PERICARDIUM
Type: IMPLANTABLE DEVICE | Site: NECK | Status: FUNCTIONAL
Brand: PHOTOFIX DECELLULARIZED BOVINE PERICARDIUM

## 2021-10-09 RX ORDER — NEOSTIGMINE METHYLSULFATE 1 MG/ML
VIAL (ML) INJECTION PRN
Status: DISCONTINUED | OUTPATIENT
Start: 2021-10-09 | End: 2021-10-09

## 2021-10-09 RX ORDER — SODIUM CHLORIDE, SODIUM LACTATE, POTASSIUM CHLORIDE, CALCIUM CHLORIDE 600; 310; 30; 20 MG/100ML; MG/100ML; MG/100ML; MG/100ML
INJECTION, SOLUTION INTRAVENOUS CONTINUOUS
Status: DISCONTINUED | OUTPATIENT
Start: 2021-10-09 | End: 2021-10-10

## 2021-10-09 RX ORDER — LIDOCAINE HYDROCHLORIDE 20 MG/ML
JELLY TOPICAL ONCE
Status: COMPLETED | OUTPATIENT
Start: 2021-10-09 | End: 2021-10-09

## 2021-10-09 RX ORDER — LABETALOL HYDROCHLORIDE 5 MG/ML
10 INJECTION, SOLUTION INTRAVENOUS EVERY 10 MIN PRN
Status: DISCONTINUED | OUTPATIENT
Start: 2021-10-09 | End: 2021-10-09

## 2021-10-09 RX ORDER — PROTAMINE SULFATE 10 MG/ML
INJECTION, SOLUTION INTRAVENOUS PRN
Status: DISCONTINUED | OUTPATIENT
Start: 2021-10-09 | End: 2021-10-09

## 2021-10-09 RX ORDER — NALOXONE HYDROCHLORIDE 0.4 MG/ML
0.4 INJECTION, SOLUTION INTRAMUSCULAR; INTRAVENOUS; SUBCUTANEOUS
Status: DISCONTINUED | OUTPATIENT
Start: 2021-10-09 | End: 2021-10-10 | Stop reason: HOSPADM

## 2021-10-09 RX ORDER — ONDANSETRON 2 MG/ML
INJECTION INTRAMUSCULAR; INTRAVENOUS PRN
Status: DISCONTINUED | OUTPATIENT
Start: 2021-10-09 | End: 2021-10-09

## 2021-10-09 RX ORDER — ONDANSETRON 4 MG/1
4 TABLET, ORALLY DISINTEGRATING ORAL EVERY 6 HOURS PRN
Status: DISCONTINUED | OUTPATIENT
Start: 2021-10-09 | End: 2021-10-10 | Stop reason: HOSPADM

## 2021-10-09 RX ORDER — PROCHLORPERAZINE MALEATE 5 MG
5 TABLET ORAL EVERY 6 HOURS PRN
Status: DISCONTINUED | OUTPATIENT
Start: 2021-10-09 | End: 2021-10-10 | Stop reason: HOSPADM

## 2021-10-09 RX ORDER — FENTANYL CITRATE 0.05 MG/ML
25 INJECTION, SOLUTION INTRAMUSCULAR; INTRAVENOUS EVERY 5 MIN PRN
Status: DISCONTINUED | OUTPATIENT
Start: 2021-10-09 | End: 2021-10-09 | Stop reason: HOSPADM

## 2021-10-09 RX ORDER — HEPARIN SODIUM 1000 [USP'U]/ML
INJECTION, SOLUTION INTRAVENOUS; SUBCUTANEOUS PRN
Status: DISCONTINUED | OUTPATIENT
Start: 2021-10-09 | End: 2021-10-09

## 2021-10-09 RX ORDER — AMOXICILLIN 250 MG
1 CAPSULE ORAL 2 TIMES DAILY
Status: DISCONTINUED | OUTPATIENT
Start: 2021-10-09 | End: 2021-10-09

## 2021-10-09 RX ORDER — LABETALOL 20 MG/4 ML (5 MG/ML) INTRAVENOUS SYRINGE
PRN
Status: DISCONTINUED | OUTPATIENT
Start: 2021-10-09 | End: 2021-10-09

## 2021-10-09 RX ORDER — PROPOFOL 10 MG/ML
INJECTION, EMULSION INTRAVENOUS CONTINUOUS PRN
Status: DISCONTINUED | OUTPATIENT
Start: 2021-10-09 | End: 2021-10-09

## 2021-10-09 RX ORDER — NALOXONE HYDROCHLORIDE 0.4 MG/ML
0.2 INJECTION, SOLUTION INTRAMUSCULAR; INTRAVENOUS; SUBCUTANEOUS
Status: DISCONTINUED | OUTPATIENT
Start: 2021-10-09 | End: 2021-10-10 | Stop reason: HOSPADM

## 2021-10-09 RX ORDER — POLYETHYLENE GLYCOL 3350 17 G/17G
17 POWDER, FOR SOLUTION ORAL DAILY
Status: DISCONTINUED | OUTPATIENT
Start: 2021-10-10 | End: 2021-10-10 | Stop reason: HOSPADM

## 2021-10-09 RX ORDER — OXYCODONE HYDROCHLORIDE 5 MG/1
5 TABLET ORAL EVERY 4 HOURS PRN
Status: DISCONTINUED | OUTPATIENT
Start: 2021-10-09 | End: 2021-10-09 | Stop reason: HOSPADM

## 2021-10-09 RX ORDER — ONDANSETRON 2 MG/ML
4 INJECTION INTRAMUSCULAR; INTRAVENOUS EVERY 6 HOURS PRN
Status: DISCONTINUED | OUTPATIENT
Start: 2021-10-09 | End: 2021-10-10 | Stop reason: HOSPADM

## 2021-10-09 RX ORDER — HYDROXYZINE HYDROCHLORIDE 10 MG/1
10 TABLET, FILM COATED ORAL EVERY 6 HOURS PRN
Status: DISCONTINUED | OUTPATIENT
Start: 2021-10-09 | End: 2021-10-10 | Stop reason: HOSPADM

## 2021-10-09 RX ORDER — HYDRALAZINE HYDROCHLORIDE 20 MG/ML
5 INJECTION INTRAMUSCULAR; INTRAVENOUS ONCE
Status: COMPLETED | OUTPATIENT
Start: 2021-10-09 | End: 2021-10-09

## 2021-10-09 RX ORDER — CEFAZOLIN SODIUM 2 G/100ML
2 INJECTION, SOLUTION INTRAVENOUS SEE ADMIN INSTRUCTIONS
Status: DISCONTINUED | OUTPATIENT
Start: 2021-10-09 | End: 2021-10-09

## 2021-10-09 RX ORDER — FENTANYL CITRATE 50 UG/ML
INJECTION, SOLUTION INTRAMUSCULAR; INTRAVENOUS PRN
Status: DISCONTINUED | OUTPATIENT
Start: 2021-10-09 | End: 2021-10-09

## 2021-10-09 RX ORDER — BISACODYL 10 MG
10 SUPPOSITORY, RECTAL RECTAL DAILY PRN
Status: DISCONTINUED | OUTPATIENT
Start: 2021-10-09 | End: 2021-10-10 | Stop reason: HOSPADM

## 2021-10-09 RX ORDER — PROPOFOL 10 MG/ML
INJECTION, EMULSION INTRAVENOUS PRN
Status: DISCONTINUED | OUTPATIENT
Start: 2021-10-09 | End: 2021-10-09

## 2021-10-09 RX ORDER — OXYCODONE HYDROCHLORIDE 5 MG/1
5 TABLET ORAL EVERY 4 HOURS PRN
Status: DISCONTINUED | OUTPATIENT
Start: 2021-10-09 | End: 2021-10-10 | Stop reason: HOSPADM

## 2021-10-09 RX ORDER — ACETAMINOPHEN 325 MG/1
975 TABLET ORAL EVERY 8 HOURS
Status: DISCONTINUED | OUTPATIENT
Start: 2021-10-09 | End: 2021-10-10 | Stop reason: HOSPADM

## 2021-10-09 RX ORDER — ACETAMINOPHEN 325 MG/1
650 TABLET ORAL EVERY 4 HOURS PRN
Status: DISCONTINUED | OUTPATIENT
Start: 2021-10-12 | End: 2021-10-10 | Stop reason: HOSPADM

## 2021-10-09 RX ORDER — CEFAZOLIN SODIUM 2 G/100ML
2 INJECTION, SOLUTION INTRAVENOUS
Status: COMPLETED | OUTPATIENT
Start: 2021-10-09 | End: 2021-10-09

## 2021-10-09 RX ORDER — LABETALOL HYDROCHLORIDE 5 MG/ML
10 INJECTION, SOLUTION INTRAVENOUS EVERY 10 MIN PRN
Status: DISCONTINUED | OUTPATIENT
Start: 2021-10-09 | End: 2021-10-10 | Stop reason: HOSPADM

## 2021-10-09 RX ORDER — OXYCODONE HYDROCHLORIDE 5 MG/1
10 TABLET ORAL EVERY 4 HOURS PRN
Status: DISCONTINUED | OUTPATIENT
Start: 2021-10-09 | End: 2021-10-10 | Stop reason: HOSPADM

## 2021-10-09 RX ORDER — SODIUM CHLORIDE 9 MG/ML
INJECTION, SOLUTION INTRAVENOUS CONTINUOUS
Status: DISCONTINUED | OUTPATIENT
Start: 2021-10-09 | End: 2021-10-10

## 2021-10-09 RX ORDER — ONDANSETRON 4 MG/1
4 TABLET, ORALLY DISINTEGRATING ORAL EVERY 30 MIN PRN
Status: DISCONTINUED | OUTPATIENT
Start: 2021-10-09 | End: 2021-10-09 | Stop reason: HOSPADM

## 2021-10-09 RX ORDER — LIDOCAINE HYDROCHLORIDE 20 MG/ML
INJECTION, SOLUTION INFILTRATION; PERINEURAL PRN
Status: DISCONTINUED | OUTPATIENT
Start: 2021-10-09 | End: 2021-10-09

## 2021-10-09 RX ORDER — HYDROMORPHONE HCL IN WATER/PF 6 MG/30 ML
0.2 PATIENT CONTROLLED ANALGESIA SYRINGE INTRAVENOUS EVERY 5 MIN PRN
Status: DISCONTINUED | OUTPATIENT
Start: 2021-10-09 | End: 2021-10-09 | Stop reason: HOSPADM

## 2021-10-09 RX ORDER — SODIUM CHLORIDE, SODIUM LACTATE, POTASSIUM CHLORIDE, CALCIUM CHLORIDE 600; 310; 30; 20 MG/100ML; MG/100ML; MG/100ML; MG/100ML
INJECTION, SOLUTION INTRAVENOUS CONTINUOUS
Status: DISCONTINUED | OUTPATIENT
Start: 2021-10-09 | End: 2021-10-09 | Stop reason: HOSPADM

## 2021-10-09 RX ORDER — LIDOCAINE 40 MG/G
CREAM TOPICAL
Status: DISCONTINUED | OUTPATIENT
Start: 2021-10-09 | End: 2021-10-10 | Stop reason: HOSPADM

## 2021-10-09 RX ORDER — ONDANSETRON 2 MG/ML
4 INJECTION INTRAMUSCULAR; INTRAVENOUS EVERY 30 MIN PRN
Status: DISCONTINUED | OUTPATIENT
Start: 2021-10-09 | End: 2021-10-09 | Stop reason: HOSPADM

## 2021-10-09 RX ORDER — MAGNESIUM HYDROXIDE 1200 MG/15ML
LIQUID ORAL PRN
Status: DISCONTINUED | OUTPATIENT
Start: 2021-10-09 | End: 2021-10-09 | Stop reason: HOSPADM

## 2021-10-09 RX ORDER — GLYCOPYRROLATE 0.2 MG/ML
INJECTION, SOLUTION INTRAMUSCULAR; INTRAVENOUS PRN
Status: DISCONTINUED | OUTPATIENT
Start: 2021-10-09 | End: 2021-10-09

## 2021-10-09 RX ADMIN — HYDRALAZINE HYDROCHLORIDE 5 MG: 20 INJECTION INTRAMUSCULAR; INTRAVENOUS at 10:15

## 2021-10-09 RX ADMIN — ACETAMINOPHEN 975 MG: 325 TABLET, FILM COATED ORAL at 13:21

## 2021-10-09 RX ADMIN — LABETALOL 20 MG/4 ML (5 MG/ML) INTRAVENOUS SYRINGE 5 MG: at 09:52

## 2021-10-09 RX ADMIN — ROCURONIUM BROMIDE 50 MG: 50 INJECTION, SOLUTION INTRAVENOUS at 07:38

## 2021-10-09 RX ADMIN — SODIUM CHLORIDE: 9 INJECTION, SOLUTION INTRAVENOUS at 12:12

## 2021-10-09 RX ADMIN — SODIUM CHLORIDE, POTASSIUM CHLORIDE, SODIUM LACTATE AND CALCIUM CHLORIDE: 600; 310; 30; 20 INJECTION, SOLUTION INTRAVENOUS at 09:55

## 2021-10-09 RX ADMIN — SODIUM CHLORIDE, POTASSIUM CHLORIDE, SODIUM LACTATE AND CALCIUM CHLORIDE: 600; 310; 30; 20 INJECTION, SOLUTION INTRAVENOUS at 07:32

## 2021-10-09 RX ADMIN — OMEPRAZOLE 20 MG: 20 CAPSULE, DELAYED RELEASE ORAL at 21:10

## 2021-10-09 RX ADMIN — FENTANYL CITRATE 100 MCG: 50 INJECTION, SOLUTION INTRAMUSCULAR; INTRAVENOUS at 07:38

## 2021-10-09 RX ADMIN — SENNOSIDES AND DOCUSATE SODIUM 2 TABLET: 8.6; 5 TABLET ORAL at 21:10

## 2021-10-09 RX ADMIN — HYDROMORPHONE HYDROCHLORIDE 0.5 MG: 1 INJECTION, SOLUTION INTRAMUSCULAR; INTRAVENOUS; SUBCUTANEOUS at 08:04

## 2021-10-09 RX ADMIN — PHENYLEPHRINE HYDROCHLORIDE 0.2 MCG/KG/MIN: 10 INJECTION INTRAVENOUS at 08:26

## 2021-10-09 RX ADMIN — ACETAMINOPHEN 975 MG: 325 TABLET, FILM COATED ORAL at 21:10

## 2021-10-09 RX ADMIN — INSULIN ASPART 1 UNITS: 100 INJECTION, SOLUTION INTRAVENOUS; SUBCUTANEOUS at 19:02

## 2021-10-09 RX ADMIN — LABETALOL 20 MG/4 ML (5 MG/ML) INTRAVENOUS SYRINGE 5 MG: at 09:56

## 2021-10-09 RX ADMIN — CEFAZOLIN SODIUM 2 G: 2 INJECTION, SOLUTION INTRAVENOUS at 07:32

## 2021-10-09 RX ADMIN — GLYCOPYRROLATE 0.8 MG: 0.2 INJECTION, SOLUTION INTRAMUSCULAR; INTRAVENOUS at 09:44

## 2021-10-09 RX ADMIN — ROCURONIUM BROMIDE 20 MG: 50 INJECTION, SOLUTION INTRAVENOUS at 08:38

## 2021-10-09 RX ADMIN — LABETALOL 20 MG/4 ML (5 MG/ML) INTRAVENOUS SYRINGE 5 MG: at 09:54

## 2021-10-09 RX ADMIN — NEOSTIGMINE METHYLSULFATE 5 MG: 1 INJECTION, SOLUTION INTRAVENOUS at 09:44

## 2021-10-09 RX ADMIN — HEPARIN SODIUM 10000 UNITS: 1000 INJECTION INTRAVENOUS; SUBCUTANEOUS at 08:30

## 2021-10-09 RX ADMIN — PROTAMINE SULFATE 30 MG: 10 INJECTION, SOLUTION INTRAVENOUS at 09:32

## 2021-10-09 RX ADMIN — LIDOCAINE HYDROCHLORIDE 100 MG: 20 INJECTION, SOLUTION INFILTRATION; PERINEURAL at 07:38

## 2021-10-09 RX ADMIN — ROCURONIUM BROMIDE 10 MG: 50 INJECTION, SOLUTION INTRAVENOUS at 08:05

## 2021-10-09 RX ADMIN — ROCURONIUM BROMIDE 10 MG: 50 INJECTION, SOLUTION INTRAVENOUS at 09:03

## 2021-10-09 RX ADMIN — Medication 8 MCG: at 08:04

## 2021-10-09 RX ADMIN — LIDOCAINE HYDROCHLORIDE: 20 JELLY TOPICAL at 13:42

## 2021-10-09 RX ADMIN — PROPOFOL 30 MCG/KG/MIN: 10 INJECTION, EMULSION INTRAVENOUS at 07:57

## 2021-10-09 RX ADMIN — MIDAZOLAM 2 MG: 1 INJECTION INTRAMUSCULAR; INTRAVENOUS at 07:32

## 2021-10-09 RX ADMIN — ONDANSETRON 4 MG: 2 INJECTION INTRAMUSCULAR; INTRAVENOUS at 09:31

## 2021-10-09 RX ADMIN — PROPOFOL 200 MG: 10 INJECTION, EMULSION INTRAVENOUS at 07:38

## 2021-10-09 ASSESSMENT — LIFESTYLE VARIABLES: TOBACCO_USE: 1

## 2021-10-09 ASSESSMENT — ACTIVITIES OF DAILY LIVING (ADL)
ADLS_ACUITY_SCORE: 7
ADLS_ACUITY_SCORE: 7
ADLS_ACUITY_SCORE: 5

## 2021-10-09 ASSESSMENT — ENCOUNTER SYMPTOMS: DYSRHYTHMIAS: 1

## 2021-10-09 NOTE — PLAN OF CARE
IMC status. POD0 left carotid endarterectomy. A&Ox4, lethargic upon arrival to unit. Neuros intact ex baseline left facial droop and word finding difficulty at times. Tele: NSR. VSS on RA, capno values WNL--removed per pt request. L neck incision site CDI, drainage outlined, ice applied. SANDRA with moderate output, stripped q8h, next at 2000. Denies pain, gave scheduled tylenol. Due to dangle. Shifting weight. Regular diet, no appetite for lunch, BG checks. Voided 350 ml on own, bladder scan still showed >700 ml retained, straight cath x1 with 800 ml out, next bladder scan showed 306 ml. No BM this shift. PIV infusing NS @70 ml/hr. Plan is off IMC at 0600. Discharge pending progress.

## 2021-10-09 NOTE — PROGRESS NOTES
[facility]    Stroke Progress Note    Interval EventsNo acute overnight events. Patient underwent Left CEA today, remained in PACU till around noon and then transferred to floor. Post-op patient has a stable neuroexam.     HPI Summary  66M HTN, HLD, DM2, CAD, Afib on Xarelto who presented to ED with recurrent pre-syncopal events found to have LMCA territory infarcts due to left Carotid Stenosis  >90% now s/p LCEA. Stable exam.      Stroke Evaluation Summarized  MRI/Head CT NCCT 10/7/21: Left MCA territory small patchy acute infarcts, Age-related changes  MRI 10/7/21: Multifocal small late acute/early subacute infarcts L MCA (L insula and subinsular white matter, L frontal lobe white matter, and the L centrum semiovale) chronic microvascular ischemic disease.   Intracranial Vasculature CTA head: L proximal-mid M2 segments superior division and mid division occluded with reconstitution, Left MCA collaterals good, L ICA at the skull base demonstrates long segment moderate stenosis, L ICA sluggish flow, otherwise, no significant stenosis/occlusion.   Cervical Vasculature CTA neck: Left proximal carotid bulb greater than 90% stenosis. Left mid to distal carotid bulb occlusion or near occlusion with reconstitution. Left cervical ICA is significantly diminutive with long segment moderate to severe stenosis-may be secondary to the proximal occlusion or near occlusion versus superimposed nonocclusive dissection, Atherosclerotic plaque < 50% stenosis in the right bulb     Echocardiogram TTE 10/8/21: LA mildly dilated, RA not well visualized, LV not well visualized, diastolic function indeterminate   EKG/Telemetry ECG 10/7/21: Sinus bradycardia, no acute change   Other Testing Bilateral Carotid US: 50-69% stenosis R ICA, Near occlusion L carotid bulb/proximal ICA      LDL  10/7/2021: 81 mg/dL   A1C  10/7/2021: 8.2 %   Troponin 10/7/2021: <0.015 ug/L      Impression  Acute ischemic stroke of  "Multifocal small late acute/early subacute infarcts L MCA (L insula and subinsular white matter, L frontal lobe white matter, and the L centrum semiovale), embolic from L carotid occlusion.     Recommendations  - Stroke w/u is completed.   - Normalize BP to prevent reperfusion injury.   - on Xarelto for Afib at home--held on admission  - Continue DAPT until cleared by Vascular Surgery to start Xarelto.   - Once cleared - Continue Xarelto and Aspirin for 6 weeks and then Xarelto only.   - Statin: was on atorvastatin 40 mg daily at home, increase to 80 mg, LDL 81 (goal 40-70)  - Telemetry  - Bedside Glucose Monitoring  - PT/OT/SLP  - Stroke Education  - Euthermia, Euglycemia, Eunatremia  -Hgb A1c 8.2%, need tighter control of Diabetes, (goal <7%), recommend Mediterranean diet, follow-up with PCP  -PT/OT/SPT     Patient Follow-up    - final recommendation pending work-up     Thank you for this consult. We will continue to follow.     Patient Follow-up    - in the next 1 week(s) with PCP  - in 4-6 weeks with stroke clinic TOÑO (any stroke TOÑO) at virtual stroke clinic. Patient will be contacted by virtual stroke clinic team after discharge.   - Follow up with Vascular Surgery in one month.     No further stroke evaluation is recommended, so we will sign off. Please contact us with any additional questions.    The Stroke Staff is Dr. Holden.    Karsten Conte MD  Vascular Neurology Fellow  To page me or covering stroke neurology team member, click here: AMCOM   Choose \"On Call\" tab at top, then search dropdown box for \"Neurology Adult\", select location, press Enter, then look for stroke/neuro ICU/telestroke.    ______________________________________________________    Clinically Significant Risk Factors Present on Admission                 Medications   Scheduled Meds    acetaminophen  975 mg Oral Q8H     aspirin  81 mg Oral Daily     atorvastatin  80 mg Oral or NG Tube Daily     clopidogrel  75 mg Oral Daily     insulin " aspart   Subcutaneous TID w/meals     insulin aspart  1-10 Units Subcutaneous TID AC     insulin aspart  1-7 Units Subcutaneous At Bedtime     lidocaine   Urethral Once     [START ON 10/10/2021] polyethylene glycol  17 g Oral Daily     sodium chloride 0.9 %  90 mL Intravenous Once     senna-docusate  1-2 tablet Oral or NG Tube BID     sodium chloride (PF)  3 mL Intracatheter Q8H     sodium chloride (PF)  3 mL Intracatheter Q8H     sodium chloride (PF)  3 mL Intracatheter Q8H       Infusion Meds    lactated ringers       - MEDICATION INSTRUCTIONS -       BETA BLOCKER NOT PRESCRIBED       sodium chloride 70 mL/hr at 10/09/21 1212     sodium chloride Stopped (10/09/21 0633)       PRN Meds  [START ON 10/12/2021] acetaminophen, sore throat lozenge, bisacodyl, glucose **OR** dextrose **OR** glucagon, [DISCONTINUED] labetalol **OR** hydrALAZINE, hydrOXYzine, labetalol, lidocaine 4%, lidocaine (buffered or not buffered), lidocaine (buffered or not buffered), magnesium hydroxide, - MEDICATION INSTRUCTIONS -, naloxone **OR** naloxone **OR** naloxone **OR** naloxone, ondansetron **OR** ondansetron, oxyCODONE **OR** oxyCODONE, prochlorperazine **OR** prochlorperazine, BETA BLOCKER NOT PRESCRIBED, sodium chloride (PF), sodium chloride (PF), sodium chloride       PHYSICAL EXAMINATION  Temp:  [97.6  F (36.4  C)-99  F (37.2  C)] 97.9  F (36.6  C)  Pulse:  [52-72] 61  Resp:  [14-35] 18  BP: (131-169)/(66-88) 138/66  MAP:  [96 mmHg-103 mmHg] 96 mmHg  Arterial Line BP: (154-158)/(66-72) 154/66  SpO2:  [92 %-97 %] 97 %      General Exam  General:  patient lying in bed without any acute distress    HEENT:  normocephalic/atraumatic  Cardio:  RRR  Pulmonary:  no respiratory distress     Neuro Exam  Mental Status:  alert, oriented x 3, follows commands, speech clear and fluent, naming and repetition normal  Cranial Nerves:  visual fields intact, EOMI with normal smooth pursuit, facial sensation intact and symmetric, facial movements  symmetric, hearing not formally tested but intact to conversation, palate elevation symmetric and uvula midline, no dysarthria, tongue protrusion midline  Motor:  normal muscle tone and bulk, no abnormal movements, able to move all limbs spontaneously, strength 5/5 throughout upper and lower extremities, no pronator drift  Reflexes:  toes down-going  Sensory:  light touch sensation intact and symmetric throughout upper and lower extremities, no extinction on double simultaneous stimulation   Coordination:  normal finger-to-nose and heel-to-shin bilaterally without dysmetria, rapid alternating movements symmetric  Station/Gait:  deferred    Stroke Scales    NIHSS  Interval     Interval Comments arrival to neuro floor, initial stroke consult (10/08/21 6699)   1a. Level of Consciousness 0-->Alert, keenly responsive   1b. LOC Questions 0-->Answers both questions correctly   1c. LOC Commands 0-->Performs both tasks correctly   2.   Best Gaze 0-->Normal   3.   Visual 0-->No visual loss   4.   Facial Palsy 0-->Normal symmetrical movements   5a. Motor Arm, Left 0-->No drift, limb holds 90 (or 45) degrees for full 10 secs   5b. Motor Arm, Right 0-->No drift, limb holds 90 (or 45) degrees for full 10 secs   6a. Motor Leg, Left 0-->No drift, leg holds 30 degree position for full 5 secs   6b. Motor Leg, right 0-->No drift, leg holds 30 degree position for full 5 secs   7.   Limb Ataxia 0-->Absent   8.   Sensory 0-->Normal, no sensory loss   9.   Best Language 0-->No aphasia, normal   10. Dysarthria 0-->Normal   11. Extinction and Inattention  0-->No abnormality   Total 0 (10/08/21 1005)       Modified Arnulfo Score (Pre-morbid)  0-No deficits  Modified [place] Score (Discharge)  0-No deficits    Imaging  I personally reviewed all imaging; relevant findings per HPI.     Lab Results Data   CBC  Recent Labs   Lab 10/09/21  0621 10/07/21  2125   WBC 5.9 7.8   RBC 4.69 5.21   HGB 14.8 16.2   HCT 44.2 49.3   * 194     Basic  Metabolic Panel    Recent Labs   Lab 10/09/21  1223 10/09/21  1032 10/09/21  0621 10/08/21  1716 10/08/21  0257 10/07/21  2125 10/07/21  1628 10/07/21  1628   0000   NA  --   --  141  --   --  139  --  139  --    POTASSIUM  --   --  4.0  --  4.0 4.0   < > 4.6  --    CHLORIDE  --   --  110*  --   --  105  --  105  --    CO2  --   --  25  --   --  28  --  25  --    BUN  --   --  19  --   --  16  --  13  --    CR  --   --  0.87  --   --  0.96  --  0.90  --    * 185* 152*   < >  --  162*   < > 222*   < >   JESSICA  --   --  8.1*  --   --  8.9  --  9.3  --     < > = values in this interval not displayed.     Liver Panel  Recent Labs   Lab 10/07/21  2125   PROTTOTAL 8.1   ALBUMIN 4.1   BILITOTAL 0.9   ALKPHOS 120   AST 29   ALT 64     INR  No lab results found.   Lipid Profile    Recent Labs   Lab Test 10/07/21  2125 03/08/21  0941 04/06/20  1210   CHOL 142 128 133   HDL 40 34* 37*   LDL 81 59 69   TRIG 107 173* 135     A1C    Recent Labs   Lab Test 10/07/21  2125   A1C 8.2*     Troponin I    Recent Labs   Lab 10/07/21  2125   TROPONIN <0.015

## 2021-10-09 NOTE — BRIEF OP NOTE
[facility]    Brief Operative Note    Pre-operative diagnosis: Acute embolic stroke (H) [I63.9]  Carotid artery stenosis, symptomatic, left [I65.22]  Post-operative diagnosis Same as pre-operative diagnosis    Procedure: Procedure(s):  LEFT CAROTID ENDARTERECTOMY  Surgeon: Surgeon(s) and Role:     * Jaycob Mayo MD - Primary     * Jayne Shi MD - Fellow - Assisting  Anesthesia: General   Estimated Blood Loss: Less than 100 ml    Drains: SANDRA drain  Specimens:   ID Type Source Tests Collected by Time Destination   A : LEFT CAROTID PLAQUE Tissue Carotid Plaque, Left OR DOCUMENTATION ONLY Jaycob Mayo MD 10/9/2021  8:33 AM      Findings:   High grade lesion in the proximal ICA. No neuro deficits on wakeup.  Complications: None.  Implants:   Implant Name Type Inv. Item Serial No.  Lot No. LRB No. Used Action   IMP PATCH PERICARDIUM PHOTOFIX BOVINE 0.8X8CM [id number] - [id number] Bone/Tissue/Biologic IMP PATCH PERICARDIUM PHOTOFIX BOVINE 0.8X8CM [id number]  [facility] [id number] Left 1 Implanted

## 2021-10-09 NOTE — ANESTHESIA POSTPROCEDURE EVALUATION
Patient: Reggie Roper    Procedure: Procedure(s):  LEFT CAROTID ENDARTERECTOMY       Diagnosis:Acute embolic stroke (H) [I63.9]  Carotid artery stenosis, symptomatic, left [I65.22]  Diagnosis Additional Information: No value filed.    Anesthesia Type:  General    Note:  Disposition: Inpatient   Postop Pain Control: Uneventful            Sign Out: Well controlled pain   PONV: No   Neuro/Psych: Uneventful            Sign Out: Acceptable/Baseline neuro status   Airway/Respiratory: Uneventful            Sign Out: Acceptable/Baseline resp. status   CV/Hemodynamics: Uneventful            Sign Out: Acceptable CV status   Other NRE: NONE   DID A NON-ROUTINE EVENT OCCUR? No           Last vitals:  Vitals Value Taken Time   /79 10/09/21 1030   Temp 37  C (98.6  F) 10/09/21 1003   Pulse 63 10/09/21 1035   Resp 20 10/09/21 1035   SpO2 91 % 10/09/21 1035   Vitals shown include unvalidated device data.    Electronically Signed By: Suhail Dorsey MD  October 9, 2021  10:36 AM

## 2021-10-09 NOTE — PROVIDER NOTIFICATION
"MD Notification    Notified Person: MD    Notified Person Name: Jayne Shi    Notification Date/Time:1620    Notification Interaction:    Purpose of Notification: \"302 A.H. Patient does not have parameters in meds or in active orders that match what we were told of keeping SBP <140 . Please change orders.\"    1650 - repage sent d/t no reply.    1723 - repage. \"302 A.H. Patient BP x2 over 140, male PA told RN it needs to be under 140. Order and meds do not indicate that. Nikki *[id number]\"    Orders Received:   Comments:      "

## 2021-10-09 NOTE — ANESTHESIA PREPROCEDURE EVALUATION
Anesthesia Pre-Procedure Evaluation    Patient: Reggie Roper   MRN: [id number] : 1955        Preoperative Diagnosis: Acute embolic stroke (H) [I63.9]  Carotid artery stenosis, symptomatic, left [I65.22]    Procedure : Procedure(s):  LEFT CAROTID ENDARTERECTOMY          Past Medical History:   Diagnosis Date     Abnormal stress test      Chest pain     Midsternal Pain     Diabetes mellitus (H)      Hyperlipidemia      Hypertension      Shortness of breath on exertion       Past Surgical History:   Procedure Laterality Date     CATARACT EXTRACTION        No Known Allergies   Social History     Tobacco Use     Smoking status: Former Smoker     Types: Cigars, Cigars     Smokeless tobacco: Never Used   Substance Use Topics     Alcohol use: Yes     Comment: Alcoholic Drinks/day: occasional      Wt Readings from Last 1 Encounters:   10/07/21 103.9 kg (229 lb)        Anesthesia Evaluation            ROS/MED HX  ENT/Pulmonary:     (+) tobacco use,  (-) sleep apnea   Neurologic:     (+) CVA,     Cardiovascular:     (+) Dyslipidemia hypertension--CAD ---dysrhythmias, a-fib, Previous cardiac testing   Echo: Date: Results:  ______________________________________________________________________________  Interpretation Summary     This TTE echo is nearly unreadable due to lung interference. No obvious clot  seen but a KESHA would be needed to exclude a cardiac source of embolism  LV not well seen, grossly normal but limited views  ______________________________________________________________________________  Left Ventricle  The left ventricle is normal in size. There is normal left ventricular wall  thickness. LV not well seen, grossly normal but limited views. Left  ventricular diastolic function is indeterminate. Regional wall motion  abnormalities cannot be excluded due to limited visualization.     Right Ventricle  The right ventricle is not well visualized.     Atria  The left atrium is mildly dilated. Right atrium not  well visualized.     Mitral Valve  The mitral valve is not well visualized.     Tricuspid Valve  The tricuspid valve is not well visualized.     Aortic Valve  No hemodynamically significant valvular aortic stenosis.     Pulmonic Valve  The pulmonic valve is not well visualized.     Vessels  The aortic root is normal size.     Pericardium  The pericardium appears normal.     Rhythm  Sinus rhythm was noted.  ______________________________________________________________________________  Stress Test: Date: Results:    ECG Reviewed: Date: Results:    Cath: Date: Results:      METS/Exercise Tolerance:     Hematologic:       Musculoskeletal:       GI/Hepatic:     (+) GERD,     Renal/Genitourinary:       Endo:     (+) type II DM,     Psychiatric/Substance Use:       Infectious Disease:       Malignancy:       Other:            Physical Exam    Airway        Mallampati: II   TM distance: > 3 FB   Neck ROM: full   Mouth opening: > 3 cm    Respiratory Devices and Support         Dental       (+) partials      Cardiovascular   cardiovascular exam normal          Pulmonary   pulmonary exam normal                OUTSIDE LABS:  CBC:   Lab Results   Component Value Date    WBC 5.9 10/09/2021    WBC 7.8 10/07/2021    HGB 14.8 10/09/2021    HGB 16.2 10/07/2021    HCT 44.2 10/09/2021    HCT 49.3 10/07/2021     (L) 10/09/2021     10/07/2021     BMP:   Lab Results   Component Value Date     10/09/2021     10/07/2021    POTASSIUM 4.0 10/09/2021    POTASSIUM 4.0 10/08/2021    CHLORIDE 110 (H) 10/09/2021    CHLORIDE 105 10/07/2021    CO2 25 10/09/2021    CO2 28 10/07/2021    BUN 19 10/09/2021    BUN 16 10/07/2021    CR 0.87 10/09/2021    CR 0.96 10/07/2021     (H) 10/09/2021     (H) 10/09/2021     COAGS: No results found for: PTT, INR, FIBR  POC: No results found for: BGM, HCG, HCGS  HEPATIC:   Lab Results   Component Value Date    ALBUMIN 4.1 10/07/2021    PROTTOTAL 8.1 10/07/2021    ALT 64  10/07/2021    AST 29 10/07/2021    ALKPHOS 120 10/07/2021    BILITOTAL 0.9 10/07/2021     OTHER:   Lab Results   Component Value Date    A1C 8.2 (H) 10/07/2021    JESSICA 8.1 (L) 10/09/2021    MAG 2.2 10/07/2021    TSH 1.79 10/07/2021       Anesthesia Plan    ASA Status:  3   NPO Status:  NPO Appropriate    Anesthesia Type: General.     - Airway: ETT   Induction: Intravenous.   Maintenance: Inhalation.   Techniques and Equipment:     - Airway: Video-Laryngoscope         Consents    Anesthesia Plan(s) and associated risks, benefits, and realistic alternatives discussed. Questions answered and patient/representative(s) expressed understanding.     - Discussed with:  Patient         Postoperative Care    Pain management: IV analgesics, Oral pain medications.   PONV prophylaxis: Ondansetron (or other 5HT-3), Dexamethasone or Solumedrol     Comments:                Suhail Dorsey MD

## 2021-10-09 NOTE — OP NOTE
Preoperative diagnosis: Symptomatic, severe left carotid stenosis.    Postoperative diagnosis: Same.    Procedure:  1.  Left carotid endarterectomy with bovine pericardial patch angioplasty.  2.  Intraoperative EEG.  3.  Temporary left common carotid artery to distal internal carotid artery shunting.  4.  Intraoperative ultrasonography.    Surgeon: Jaycob Mayo M.D.  Assistant: Jayne Shi M.D.    Anesthesia: General.  Estimated blood loss: 200 mL.  Specimens: None.  Complications: None.  Drains: #15 drain was placed in the wound bed.    Indication for procedure: This is a 66-year-old male who presented with left hemispheric cerebrovascular accident of an embolic nature.  He has greater than 90% stenosis of the left cervical internal carotid artery.  Left carotid endarterectomy is advised for prevention of recurrent and future disabling stroke.    Procedure details: Patient was identified and then taken to the operating room and placed in supine position.  General anesthesia was induced.  Preoperative intravenous antibiotics were administered.  The left side of the neck and chest were prepped and a sterile surgical field was created.  Preprocedure timeout was conducted.    A linear incision was made from the angle of the jaw to the sternal notch overlying the anterior border of the sternocleidomastoid muscle.  Incision was deepened with electrocautery.  Platysma muscle was divided with electrocautery.  The sternocleidomastoid muscle was identified and retracted laterally.  The carotid sheath was identified.  Internal jugular vein was retracted laterally.  Vagus nerve was identified and protected.  The left common carotid artery was dissected out and placed in Vesseloops.  10,000 units of heparin were administered.  We continued our dissection more cephalad.  Multiple branches of venous tributaries including the left facial vein were ligated with silk suture and divided to allow for more lateral retraction of the  internal jugular vein.  The superior thyroid artery and external carotid artery were dissected out and placed in Vesseloops.  We identified the hypoglossal nerve and protected it.  Distal internal carotid artery was dissected out and placed in Vesseloops.  It had been 5 minutes to the administration of heparin.  Mean arterial pressure was raised 200 mmHg.  The distal internal carotid artery was clamped followed by the superior thyroid and external carotid arteries.  Then the common carotid artery was unclamped.  There were no EEG changes.  After the distal internal carotid artery was clamped the carotid bulb was dissected free.  A #11 blade was used to make an anterior linear arteriotomy into the common carotid artery.  It was extended further into the mid internal carotid artery using a Uriarte scissor.  Near occlusive plaque of calcified and fatty atherosclerotic lesion was endarterectomized.  Plaque was sharply divided in the internal carotid artery and the common carotid artery.  The external carotid artery was everted and endarterectomized.  Loose tissue debris from the arterial lumen was carefully and meticulously removed.  Then we established a shunt by first placing it into the internal carotid artery and allowing it to backbleed and securing it with a shunt clamp.  Then it was placed into the common carotid artery and secured with a shunt clamp.  There were no EEG changes.    The arteriotomy was repaired with a bovine pericardial patch.  In the internal carotid artery the suture line was done with 6-0 Prolene and in the common carotid artery the suture line was done with 5-0 Prolene.  Prior to completion of the suture line the shunt was removed from the internal carotid artery and the internal carotid artery was allowed to backbleed, there was good backbleeding, it was reclamped.  Then the shunt was removed from the common carotid artery.  The common carotid artery was unclamped and there was robust inflow,  it was reclamped.  The external carotid artery was also unclamped and allowed to backbleed, there was good backbleeding and it was reclamped.  Arterial lumen was washed out with heparinized saline solution.  Suture line was completed.  Patient was running on a low-dose of Adriel-Synephrine drip which was then stopped and mean arterial pressure was allowed to come down to about 80 mmHg.  The common and external carotid arteries were unclamped and about 2 minutes later the internal carotid artery was unclamped.  There was one area in the suture line which required additional buttressing sutures with 6-0 Prolene to achieve complete suture line hemostasis.    Intraoperative ultrasonography was performed.  There was no evidence of hemodynamically significant stenosis in the common carotid, internal carotid, external carotid and the internal carotid artery beyond the site of endarterectomy and patching.  On B-mode axial imaging there were no dissection flaps.    30 mg of protamine were administered.  Local hemostatic agents were used to achieve complete hemostasis.  A separate stab incision was made in the left supraclavicular area and a #15 drain was placed in the wound bed and secured with silk suture.  Platysma layer was approximated with running 3-0 PDS and skin was approximated with 4-0 Monocryl suture.    Counts of instruments, sponges and needles were noted to be correct.    Patient was awakened and extubated in the operating room.  He was able to move both upper and lower extremities with equal 5 out of 5 power.    He was then allowed to going to the recovery room.    I called the patient's daughter and updated her on our progress.

## 2021-10-09 NOTE — ANESTHESIA CARE TRANSFER NOTE
Patient: Reggie Roper    Procedure: Procedure(s):  LEFT CAROTID ENDARTERECTOMY       Diagnosis: Acute embolic stroke (H) [I63.9]  Carotid artery stenosis, symptomatic, left [I65.22]  Diagnosis Additional Information: No value filed.    Anesthesia Type:   No value filed.     Note:    Oropharynx: oropharynx clear of all foreign objects  Level of Consciousness: drowsy  Oxygen Supplementation: face mask  Level of Supplemental Oxygen (L/min / FiO2): 6  Independent Airway: airway patency satisfactory and stable  Dentition: dentition unchanged  Vital Signs Stable: post-procedure vital signs reviewed and stable  Report to RN Given: handoff report given  Patient transferred to: PACU  Comments: Neuromuscular blockade reversed after TOF 4/4, spontaneous respirations, adequate tidal volumes, followed commands to voice, oropharynx suctioned with soft flexible catheter, extubated atraumatically, extubated with suction, airway patent after extubation.  Oxygen via facemask at 6 liters per minute to PACU. Oxygen tubing connected to wall O2 in PACU, SpO2, NiBP, and EKG monitors and alarms on and functioning, report on patient's clinical status given to PACU RN, RN questions answered.     Handoff Report: Identifed the Patient, Identified the Reponsible Provider, Reviewed the pertinent medical history, Discussed the surgical course, Reviewed Intra-OP anesthesia mangement and issues during anesthesia, Set expectations for post-procedure period and Allowed opportunity for questions and acknowledgement of understanding      Vitals:  Vitals Value Taken Time   /87 10/09/21 1003   Temp     Pulse 67 10/09/21 1007   Resp 16 10/09/21 1007   SpO2 95 % 10/09/21 1007   Vitals shown include unvalidated device data.    Electronically Signed By: MARIA GUADALUPE Lechuga CRNA  October 9, 2021  10:07 AM

## 2021-10-09 NOTE — PROVIDER NOTIFICATION
MD Notification    Notified Person: MD    Notified Person Name: Dr. Brown    Notification Date/Time: 1737    Notification Interaction:    Purpose of Notification: Order parameters for VS and medications.     Orders Received:    Comments:

## 2021-10-09 NOTE — ANESTHESIA PROCEDURE NOTES
Arterial Line Procedure Note    Pre-Procedure   Staff -        Anesthesiologist:  Suhail Dorsey MD       Performed By: Anesthesiologist       Location: pre-op       Pre-Anesthestic Checklist: patient identified, IV checked, site marked, risks and benefits discussed, informed consent, monitors and equipment checked, pre-op evaluation and at physician/surgeon's request  Timeout:       Correct Patient: Yes        Correct Procedure: Yes        Correct Site: Yes        Correct Position: Yes   Procedure   Procedure: arterial line       Laterality: left       Insertion Site: radial (Radial).  Sterile Prep        All elements of maximal sterile barrier technique followed       Patient Prep/Sterile Barriers: hand hygiene, sterile gloves, hat, mask, draped, sterile gown, draped       Skin prep: Chloraprep  Insertion/Injection        Technique: Seldinger Technique        Catheter Type/Size: 20 gauge, 1.75 in/4.5 cm quick cath (integral wire)  Narrative        Tegaderm dressing used.       Arterial waveform: Yes        IBP within 10% of NIBP: Yes

## 2021-10-09 NOTE — ANESTHESIA PROCEDURE NOTES
Airway       Patient location during procedure: OR       Procedure Start/Stop Times: 10/9/2021 7:40 AM  Staff -        Anesthesiologist:  Suhail Dorsey MD       CRNA: Marcela Albrecht APRN CRNA       Performed By: CRNA  Consent for Airway        Urgency: elective  Indications and Patient Condition       Indications for airway management: rissa-procedural       Induction type:intravenous       Mask difficulty assessment: 2 - vent by mask + OA or adjuvant +/- NMBA    Final Airway Details       Final airway type: endotracheal airway       Successful airway: ETT - single  Endotracheal Airway Details        ETT size (mm): 8.0       Cuffed: yes       Successful intubation technique: video laryngoscopy       VL Blade Size: Glidescope 4       Grade View of Cords: 1       Adjucts: stylet       Position: Right       Measured from: gums/teeth       Secured at (cm): 24       Bite block used: Soft    Post intubation assessment        Placement verified by: capnometry, equal breath sounds and chest rise        Number of attempts at approach: 1       Number of other approaches attempted: 0       Secured with: silk tape and other (comment) (tegaderm)       Ease of procedure: easy       Dentition: Unchanged

## 2021-10-09 NOTE — PROGRESS NOTES
"Pt here for LMCA stroke, Left ICA stenosis. A/O x4 and able to make needs known. Neuros intact, VSS, on RA, A1/GB, Ambulating to bathroom.  BG checks before meals and at HS, sliding scale insulin available.Tele NSR, Denies pain. NPO since midnight for planned Left CEA this morning at 0730.     BP (!) 154/88 (BP Location: Right arm)   Pulse 52   Temp 97.6  F (36.4  C) (Oral)   Resp 18   Ht 1.778 m (5' 10\")   Wt 103.9 kg (229 lb)   SpO2 96%   BMI 32.86 kg/m      Johnathan Morel RN    "

## 2021-10-09 NOTE — PROGRESS NOTES
[facility]    HOSPITALIST PROGRESS NOTE :   --------------------------------------------------    Date of Admission:  10/7/2021    Cumulative Summary: Reggie Roper is a 66 year old male with past medical history significant for essential hypertension, hyperlipidemia, type 2 diabetes mellitus, coronary artery disease, paroxysmal atrial fibrillation anticoagulated with Xarelto who presented to the ED on October 7, 2021 due to recurrent presyncopal episode for 1 day associated with 1 fall on October 6.  Patient also noticed associated right hand weakness and disorientation while at work.  Later his family was concerned about his confusion and word finding difficulty and eventually patient was brought to the ED by his ex-wife.  Patient was found to have acute ischemic a stroke of multifocal small late acute/early subacute infarcts and was admitted for further evaluation and management.    Assessment & Plan     Acute ischemic stroke (H) (10/7/2021):   Patient is a 66-year-old female with past medical history significant for essential hypertension, hyperlipidemia, type 2 diabetes, coronary artery disease and paroxysmal atrial fibrillation, reporting compliance with Xarelto, probably missed 1 dose per report who presented to the ED October 7 due to recurrent presyncopal episodes, when associated with right hand weakness and disorientation while at work.  EMS was contacted but did not bring patient to the ED.  Later his daughter tried calling him and was not able to contact so called police for welfare check.  Was feeling well at the time of welfare check.  Later he went to the clinic and CT scan was ordered and he was dilated to [facility] to get the scan however he was unable to find his way so he went home instead.  Eventually he was unable to communicate appropriately to family via text so his ex-wife brought him to the emergency room.  Imaging showed left MCA acute/subacute infarct  with near complete left carotid occlusion.  Patient was not a candidate for TNKase.  EKG on admission showed sinus bradycardia no acute changes.  Bilateral carotid ultrasound showed severe restenosis of distal left common carotid artery and carotid bulb.  Left symptomatic carotid artery stenosis: S/p left carotid endarterectomy by     --Patient was seen and examined this afternoon, just came back from PACU underwent left carotid endarterectomy and tolerated the procedure well.  --Restart patient on general diet after the surgery as he tolerates the diet.  --Continue patient on IV fluids, plan to stop the fluids once patient is tolerating the diet and vitals are stable.  --Continue to monitor neurochecks and vital signs every 4 hours.  --Will restart his antihypertensive medications from tomorrow morning currently getting permissive hypertension with goal of keeping systolic blood pressure less than 220.  --His PTA Xarelto has been on held, in the meantime patient has been a started on aspirin and Plavix till patient can be started back on Xarelto once okay with surgery and neurology  --Echocardiogram results are reviewed showing normal LV size, normal LV wall thickness, left ventricular diastolic function is indeterminate, regional wall motion abnormalities cannot be excluded due to limited visualization.  --Physical, occupational and speech therapy has been ordered, will follow up on their recommendations after the surgery    Atrial fibrillation:   Mild coronary artery disease on CT angiogram in 2014:  Essential hypertension:  Hyperlipidemia:  His home medications include atorvastatin 40 mg p.o. daily, Bystolic 10 mg p.o. daily, rivaroxaban 20 mg p.o. daily, Tekturna 150 mg p.o. daily.    --We will hold oral antihypertensive medications at this time for permissive hypertension.  --PTA atorvastatin is increased to 80 mg from 40 mg.    Noninsulin-dependent type 2 diabetes mellitus: Home medications include  Metformin 2000 mg p.o. daily.    --Hemoglobin A1c has been checked, which came elevated around 8.2.  --We will go ahead and start patient on medium dose sliding scale insulin and meal coverage with 1 unit of NovoLog for 10 g of carb.  --We will discuss with patient that probably he will need addition of another oral hypoglycemic agent.    GERD  [needs rec- omeprazole 20 mg daily]  --Start patient on PTA omeprazole 20 mg p.o. daily    Clinically Significant Risk Factors Present on Admission                Diet: Advance Diet as Tolerated: Regular Diet Adult starting at midnight   Rodríguez Catheter: Not present  DVT Prophylaxis: Xarelto on hold , continue compression stockings   Code Status: Full Code    The patient's care was discussed with the Bedside Nurse and Patient.    Disposition Plan   Expected discharge: 10/11/2021, expecting patient to stay more than 2 nights in the hospital.  More than 35 minutes of the time was a spent in patient care, more than 50% of the time was spent in counseling and coordination of the care.    Megan Yung MD, FACP  Text Page (7am - 6pm)    ----------------------------------------------------------------------------------------------------------------------    Interval History    Patient was seen and examined this afternoon, just came back from PACU, his surgical report seems good   Patient is denying any chest pain, palpitations or shortness of breath has been started on diet after the surgery  Patient is currently sleepy right after the surgery.  Plan of care was also discussed with bedside nursing.    Data reviewed today: I reviewed all new labs and imaging results over the last 24 hours.    I personally reviewed no images or EKG's today.    Physical Exam   Temp: 98.5  F (36.9  C) Temp src: Temporal BP: (!) 142/75 Pulse: 62   Resp: 21 SpO2: 92 % O2 Device: Nasal cannula Oxygen Delivery: 2 LPM  Vitals:    10/07/21 2108   Weight: 103.9 kg (229 lb)     Vital Signs with Ranges  Temp:   [97.6  F (36.4  C)-99  F (37.2  C)] 98.5  F (36.9  C)  Pulse:  [52-72] 62  Resp:  [14-35] 21  BP: (131-169)/(70-88) 142/75  MAP:  [96 mmHg-103 mmHg] 96 mmHg  Arterial Line BP: (154-158)/(66-72) 154/66  SpO2:  [92 %-97 %] 92 %  I/O last 3 completed shifts:  In: 2112.33 [P.O.:200; I.V.:1912.33]  Out: -     GENERAL: Alert , awake and oriented. NAD. Conversational, appropriate.   HEENT: Normocephalic. EOMI. No icterus or injection. Nares normal.   LUNGS: Clear to auscultation. No dyspnea at rest.   HEART: Regular rate. Extremities perfused.   ABDOMEN: Soft, nontender, and nondistended. Positive bowel sounds.   EXTREMITIES: No LE edema noted.   NEUROLOGIC: Moves extremities x4 on command. No acute focal neurologic abnormalities noted.     Medications     lactated ringers       - MEDICATION INSTRUCTIONS -       BETA BLOCKER NOT PRESCRIBED       sodium chloride 70 mL/hr at 10/09/21 1212     sodium chloride Stopped (10/09/21 0633)       acetaminophen  975 mg Oral Q8H     aspirin  81 mg Oral Daily     atorvastatin  80 mg Oral or NG Tube Daily     ceFAZolin  2 g Intravenous See Admin Instructions     clopidogrel  75 mg Oral Daily     insulin aspart   Subcutaneous TID w/meals     insulin aspart  1-10 Units Subcutaneous TID AC     insulin aspart  1-7 Units Subcutaneous At Bedtime     [START ON 10/10/2021] polyethylene glycol  17 g Oral Daily     sodium chloride 0.9 %  90 mL Intravenous Once     senna-docusate  1-2 tablet Oral or NG Tube BID     sodium chloride (PF)  3 mL Intracatheter Q8H     sodium chloride (PF)  3 mL Intracatheter Q8H     sodium chloride (PF)  3 mL Intracatheter Q8H       Data   Recent Labs   Lab 10/09/21  1223 10/09/21  1032 10/09/21  0621 10/08/21  1716 10/08/21  0257 10/07/21  2125 10/07/21  1628 10/07/21  1628   0000   WBC  --   --  5.9  --   --  7.8  --   --   --    HGB  --   --  14.8  --   --  16.2  --   --   --    MCV  --   --  94  --   --  95  --   --   --    PLT  --   --  141*  --   --  194  --   --    --    NA  --   --  141  --   --  139  --  139  --    POTASSIUM  --   --  4.0  --  4.0 4.0   < > 4.6  --    CHLORIDE  --   --  110*  --   --  105  --  105  --    CO2  --   --  25  --   --  28  --  25  --    BUN  --   --  19  --   --  16  --  13  --    CR  --   --  0.87  --   --  0.96  --  0.90  --    ANIONGAP  --   --  6  --   --  6  --  9  --    JESSICA  --   --  8.1*  --   --  8.9  --  9.3  --    * 185* 152*   < >  --  162*   < > 222*   < >   ALBUMIN  --   --   --   --   --  4.1  --   --   --    PROTTOTAL  --   --   --   --   --  8.1  --   --   --    BILITOTAL  --   --   --   --   --  0.9  --   --   --    ALKPHOS  --   --   --   --   --  120  --   --   --    ALT  --   --   --   --   --  64  --   --   --    AST  --   --   --   --   --  29  --   --   --    TROPONIN  --   --   --   --   --  <0.015  --   --   --     < > = values in this interval not displayed.       Imaging:   Recent Results (from the past 24 hour(s))   US Intraoperative    Narrative    This exam was marked as non-reportable because it will not be read by a   radiologist or a [facility] non-radiologist provider.

## 2021-10-10 ENCOUNTER — APPOINTMENT (OUTPATIENT)
Dept: SPEECH THERAPY | Facility: CLINIC | Age: 66
DRG: 039 | End: 2021-10-10
Payer: COMMERCIAL

## 2021-10-10 ENCOUNTER — APPOINTMENT (OUTPATIENT)
Dept: PHYSICAL THERAPY | Facility: CLINIC | Age: 66
DRG: 039 | End: 2021-10-10
Payer: COMMERCIAL

## 2021-10-10 VITALS
RESPIRATION RATE: 16 BRPM | SYSTOLIC BLOOD PRESSURE: 141 MMHG | HEIGHT: 70 IN | DIASTOLIC BLOOD PRESSURE: 77 MMHG | OXYGEN SATURATION: 94 % | WEIGHT: 229 LBS | BODY MASS INDEX: 32.78 KG/M2 | HEART RATE: 59 BPM | TEMPERATURE: 98.1 F

## 2021-10-10 DIAGNOSIS — I65.22 CAROTID ARTERY STENOSIS, SYMPTOMATIC, LEFT: Primary | ICD-10-CM

## 2021-10-10 LAB
GLUCOSE BLDC GLUCOMTR-MCNC: 145 MG/DL (ref 70–99)
GLUCOSE BLDC GLUCOMTR-MCNC: 177 MG/DL (ref 70–99)
PA ADP BLD-ACNC: 108 PRU

## 2021-10-10 PROCEDURE — 97116 GAIT TRAINING THERAPY: CPT | Mod: GP

## 2021-10-10 PROCEDURE — 250N000011 HC RX IP 250 OP 636: Performed by: STUDENT IN AN ORGANIZED HEALTH CARE EDUCATION/TRAINING PROGRAM

## 2021-10-10 PROCEDURE — 92526 ORAL FUNCTION THERAPY: CPT | Mod: GN | Performed by: SPEECH-LANGUAGE PATHOLOGIST

## 2021-10-10 PROCEDURE — 97750 PHYSICAL PERFORMANCE TEST: CPT | Mod: GP

## 2021-10-10 PROCEDURE — 99239 HOSP IP/OBS DSCHRG MGMT >30: CPT | Performed by: INTERNAL MEDICINE

## 2021-10-10 PROCEDURE — 85576 BLOOD PLATELET AGGREGATION: CPT | Mod: TC

## 2021-10-10 PROCEDURE — 36415 COLL VENOUS BLD VENIPUNCTURE: CPT | Performed by: NURSE PRACTITIONER

## 2021-10-10 PROCEDURE — 250N000013 HC RX MED GY IP 250 OP 250 PS 637: Performed by: STUDENT IN AN ORGANIZED HEALTH CARE EDUCATION/TRAINING PROGRAM

## 2021-10-10 PROCEDURE — 250N000013 HC RX MED GY IP 250 OP 250 PS 637: Performed by: INTERNAL MEDICINE

## 2021-10-10 RX ORDER — NEBIVOLOL 10 MG/1
10 TABLET ORAL DAILY
Status: DISCONTINUED | OUTPATIENT
Start: 2021-10-10 | End: 2021-10-10

## 2021-10-10 RX ORDER — ACETAMINOPHEN 325 MG/1
650 TABLET ORAL EVERY 4 HOURS PRN
COMMUNITY
Start: 2021-10-12 | End: 2023-10-02

## 2021-10-10 RX ORDER — ALISKIREN 150 MG/1
150 TABLET, FILM COATED ORAL DAILY
Status: DISCONTINUED | OUTPATIENT
Start: 2021-10-10 | End: 2021-10-10 | Stop reason: HOSPADM

## 2021-10-10 RX ORDER — OXYCODONE HYDROCHLORIDE 5 MG/1
5 TABLET ORAL EVERY 6 HOURS PRN
Qty: 12 TABLET | Refills: 0 | Status: SHIPPED | OUTPATIENT
Start: 2021-10-10 | End: 2021-10-15

## 2021-10-10 RX ORDER — NEBIVOLOL 10 MG/1
10 TABLET ORAL DAILY
Status: DISCONTINUED | OUTPATIENT
Start: 2021-10-10 | End: 2021-10-10 | Stop reason: HOSPADM

## 2021-10-10 RX ORDER — CLOPIDOGREL BISULFATE 75 MG/1
75 TABLET ORAL DAILY
Qty: 30 TABLET | Refills: 1 | Status: SHIPPED | OUTPATIENT
Start: 2021-10-11 | End: 2022-01-10

## 2021-10-10 RX ORDER — ATORVASTATIN CALCIUM 80 MG/1
80 TABLET, FILM COATED ORAL DAILY
Qty: 30 TABLET | Refills: 0 | Status: SHIPPED | OUTPATIENT
Start: 2021-10-11 | End: 2024-02-21 | Stop reason: ALTCHOICE

## 2021-10-10 RX ADMIN — INSULIN ASPART 2 UNITS: 100 INJECTION, SOLUTION INTRAVENOUS; SUBCUTANEOUS at 09:37

## 2021-10-10 RX ADMIN — LABETALOL HYDROCHLORIDE 10 MG: 5 INJECTION, SOLUTION INTRAVENOUS at 06:16

## 2021-10-10 RX ADMIN — SENNOSIDES AND DOCUSATE SODIUM 1 TABLET: 8.6; 5 TABLET ORAL at 09:37

## 2021-10-10 RX ADMIN — ALISKIREN HEMIFUMARATE 150 MG: 150 TABLET, FILM COATED ORAL at 11:16

## 2021-10-10 RX ADMIN — ACETAMINOPHEN 975 MG: 325 TABLET, FILM COATED ORAL at 06:16

## 2021-10-10 RX ADMIN — OMEPRAZOLE 20 MG: 20 CAPSULE, DELAYED RELEASE ORAL at 09:37

## 2021-10-10 RX ADMIN — ASPIRIN 81 MG: 81 TABLET, COATED ORAL at 09:37

## 2021-10-10 RX ADMIN — NEBIVOLOL HYDROCHLORIDE 10 MG: 10 TABLET ORAL at 09:37

## 2021-10-10 RX ADMIN — ATORVASTATIN CALCIUM 80 MG: 40 TABLET, FILM COATED ORAL at 09:37

## 2021-10-10 RX ADMIN — CLOPIDOGREL BISULFATE 75 MG: 75 TABLET ORAL at 09:37

## 2021-10-10 RX ADMIN — LABETALOL HYDROCHLORIDE 10 MG: 5 INJECTION, SOLUTION INTRAVENOUS at 04:18

## 2021-10-10 ASSESSMENT — ACTIVITIES OF DAILY LIVING (ADL)
ADLS_ACUITY_SCORE: 7
ADLS_ACUITY_SCORE: 9

## 2021-10-10 NOTE — PHARMACY-CONSULT NOTE
Pharmacy Consult to evaluate for medication related stroke core measures    Reggie Roper, 66 year old male admitted for acute embolic stroke on 10/7/2021.    Thrombolytic was not given because of Time from onset contraindications    VTE Prophylaxis SCDs /PCDs placed on 10/8/2021, as appropriate prior to end of hospital day 2.    Antithrombotic: clopidogrel started on 10/9/2021, as appropriate by end of hospital day 2. Continue antithrombotic therapy on discharge to meet quality measures, unless contraindicated.    Anticoagulation if history of A-fib/flutter: Patient on rivaroxaban (Xarelto) prior to admission.  Plan is to resume once cleared by surgery and continue anticoagulation on discharge to meet quality measures, unless contraindicated.    LDL Cholesterol Calculated   Date Value Ref Range Status   10/07/2021 81 <=100 mg/dL Final       Patient currently receiving Lipitor (atorvastatin) continue statin on discharge to meet quality measures, unless contraindicated.    Recommendations: None at this time    Thank you for the consult.    Frank Pires RPH 10/9/2021 7:47 PM

## 2021-10-10 NOTE — PROGRESS NOTES
10/10/21 0900   Signing Clinician's Name / Credentials   Signing clinician's name / credentials Юлия Welch, PT, DPT   Moreland Balance Scale (NICANOR BERNSTEIN, AUREA S, MEGHA PRADO, LUIS B: MEASURING BALANCE IN THE ELDERLY: VALIDATION OF AN INSTRUMENT. CAN. J. PUB. HEALTH, JULY/AUGUST SUPPLEMENT 2:S7-11, 1992.)   Sit To Stand 4   Standing Unsupported 4   Sitting Unsupported 4   Stand to Sit 4   Transfers 4   Standing with Eyes Closed 4   Standing Unsupported, Feet Together 4   Reach Forward With Outstretched Arm 4   Retrieve Object From Floor 4   Turning to Look Behind 4   Turn 360 Degrees 4   Placing Alternate Foot on Stool (4-6 inches) 1   Unsupported Tandem Stand (Demonstrate to Subject) 1   One Leg Stand 1   Total Score (A score of 45 or less has been correlated with an increased risk of falls)   Total Score (out of 56) 47     Moreland Balance Scale (BBS) Cutoff Scores: A score of < 45/56 indicates an increased risk for falls.   Patient Score: 47/56    The BBS is a measure of static and dynamic standing balance that has been validated in community dwelling elderly individuals and individuals who have Parkinson's Disease, MS, and those who are s/p CVA and TBI. The test is administered without an assistive device. Scores from the Moreland are used to determine the probability of falling based on the patient's previous history of falls and their test performance.     Minimal Detectable Change = 6.5   & Minimal Detectable Change (Parkinson's Disease) = 5 according to Cr & Chuckey 2008    Assessment (rationale for performing, application to patient s function & care plan): to assist with discharge recommendations for safety and follow up therapy services.     Minutes billed as physical performance test: 12

## 2021-10-10 NOTE — PLAN OF CARE
Alert and oriented x4. Patient lethargic. Vital signs stable ex. Elevated BP on RA. Up with SBA. Tolerating diet. Lungs sounds clear. BS+. Denies flatus. Voiding. Left neck incision, dressing marked- no change. SANDRA drain in place with serosanguineous output, stripped Q8. Denies pain. Denies nausea. Neuro vital signs intact ex. Baseline L sided facial droop.

## 2021-10-10 NOTE — DISCHARGE SUMMARY
[facility]  Hospitalist Discharge Summary      Date of Admission:  10/7/2021  Date of Discharge:  10/10/2021  Discharging Provider: Megan Yung MD    Discharge Diagnoses   Acute ischemic stroke   Left symptomatic carotid artery stenosis: S/p left carotid endarterectomy   Atrial fibrillation:   Mild coronary artery disease on CT angiogram in 2014:  Essential hypertension:  Hyperlipidemia  Noninsulin-dependent type 2 diabetes mellitus  GERD    Follow-ups Needed After Discharge   Follow-up Appointments     Follow-up and recommended labs and tests       Follow up with primary care provider, Simin Sanderson, within 7 days for   hospital follow- up.  The following labs/tests are recommended: BMP and   CBC  Follow up with neurology in 4-6 weeks   F/U with Vascular surgery,  in 4 weeks .             Unresulted Labs Ordered in the Past 30 Days of this Admission     No orders found from 9/7/2021 to 10/8/2021.          Discharge Disposition   Discharged to home  Condition at discharge: Stable    Hospital Course   Cumulative Summary: Reggie Roper is a 66 year old male with past medical history significant for essential hypertension, hyperlipidemia, type 2 diabetes mellitus, coronary artery disease, paroxysmal atrial fibrillation anticoagulated with Xarelto who presented to the ED on October 7, 2021 due to recurrent presyncopal episode for 1 day associated with 1 fall on October 6.  Patient also noticed associated right hand weakness and disorientation while at work.  Later his family was concerned about his confusion and word finding difficulty and eventually patient was brought to the ED by his ex-wife.  Patient was found to have acute ischemic a stroke of multifocal small late acute/early subacute infarcts and was admitted for further evaluation and management.  Here are further details regarding his current hosptalization     Acute ischemic stroke (H) (10/7/2021):   Patient is a 66-year-old  female with past medical history significant for essential hypertension, hyperlipidemia, type 2 diabetes, coronary artery disease and paroxysmal atrial fibrillation, reporting compliance with Xarelto, probably missed 1 dose per report who presented to the ED October 7 due to recurrent presyncopal episodes, when associated with right hand weakness and disorientation while at work.  EMS was contacted but did not bring patient to the ED.  Later his daughter tried calling him and was not able to contact so called police for welfare check.  Was feeling well at the time of welfare check.  Later he went to the clinic and CT scan was ordered and he was dilated to [facility] to get the scan however he was unable to find his way so he went home instead.  Eventually he was unable to communicate appropriately to family via text so his ex-wife brought him to the emergency room.  Imaging showed left MCA acute/subacute infarct with near complete left carotid occlusion.  Patient was not a candidate for TNKase.  EKG on admission showed sinus bradycardia no acute changes.  Bilateral carotid ultrasound showed severe restenosis of distal left common carotid artery and carotid bulb.  Left symptomatic carotid artery stenosis: S/p left carotid endarterectomy by      --Patient was seen and examined this morning, feeling well , hoping to go home  -- Patient is recommended to take Plavix and Xarelto after discussion with neurology and Vascular surgery, patient is not recommended to be on triple anti platelet therapy due to risk of hyper fusion bleeding  -- Patient is also recommended to have controlled blood pressure , will start patient on all his home antihypertensive to control the blood pressure .  -- Follow up with vascular surgery and neurology  --Echocardiogram results are reviewed showing normal LV size, normal LV wall thickness, left ventricular diastolic function is indeterminate, regional wall motion  abnormalities cannot be excluded due to limited visualization.  --Physical, occupational and speech therapy recommended discharge to home with family assist .     Atrial fibrillation:   Mild coronary artery disease on CT angiogram in 2014:  Essential hypertension:  Hyperlipidemia:  His home medications include atorvastatin 40 mg p.o. daily, Bystolic 10 mg p.o. daily, rivaroxaban 20 mg p.o. daily, Tekturna 150 mg p.o. daily.    -- discharge patient on PTA antihypertensives   --PTA atorvastatin is increased to 80 mg from 40 mg, script is given    Noninsulin-dependent type 2 diabetes mellitus: Home medications include Metformin 2000 mg p.o. daily.     --Hemoglobin A1c has been checked, which came elevated around 8.2.  -- Will be discharged back on PTA oral hypoglycemic agent.     GERD  [needs rec- omeprazole 20 mg daily]  --Started patient on PTA omeprazole 20 mg p.o. daily     Patient was seen and examined on the day of discharge , he is feeling well, does not have any complaints , I did review the discharge medications and instructions with the patient and plan for him to follow up with the PCP after the hospitalization .patient was in agreement , he is discharged in stable condition back to his home.    Consultations This Hospital Stay   VASCULAR SURGERY IP CONSULT  NEUROLOGY IP CONSULT  SPEECH LANGUAGE PATH ADULT IP CONSULT  PHARMACY IP CONSULT  PHARMACY IP CONSULT  PHARMACY IP CONSULT  PHYSICAL THERAPY ADULT IP CONSULT  PHARMACY IP CONSULT  PHARMACY IP CONSULT  PATIENT LEARNING CENTER IP CONSULT  OCCUPATIONAL THERAPY ADULT IP CONSULT  REHAB ADMISSIONS LIAISON IP CONSULT  CARE MANAGEMENT / SOCIAL WORK IP CONSULT  SMOKING CESSATION PROGRAM IP CONSULT  SMOKING CESSATION PROGRAM IP CONSULT    Code Status   Full Code    Time Spent on this Encounter   I, Megan Yung MD, personally saw the patient today and spent greater than 30 minutes discharging this patient.       Megan Yung MD, [facility]  [address]  [address] [place] [address]  Phone: [phone]  ______________________________________________________________________    Physical Exam   Vital Signs: Temp: 98.1  F (36.7  C) Temp src: Oral BP: (!) 141/77 Pulse: 59   Resp: 16 SpO2: 94 % O2 Device: None (Room air) Oxygen Delivery: 2 LPM  Weight: 229 lbs 0 oz    Physical Exam  Vitals and nursing note reviewed.   Constitutional:       Appearance: He is well-developed.   HENT:      Head: Normocephalic and atraumatic.   Eyes:      Pupils: Pupils are equal, round, and reactive to light.   Neck:      Thyroid: No thyromegaly.   Cardiovascular:      Rate and Rhythm: Normal rate and regular rhythm.      Heart sounds: Normal heart sounds.   Pulmonary:      Effort: Pulmonary effort is normal. No respiratory distress.      Breath sounds: Normal breath sounds.   Abdominal:      General: Bowel sounds are normal. There is no distension.      Palpations: Abdomen is soft.   Musculoskeletal:         General: No tenderness. Normal range of motion.      Cervical back: Normal range of motion and neck supple.   Skin:     General: Skin is warm and dry.   Neurological:      Mental Status: He is alert and oriented to person, place, and time.   Psychiatric:         Behavior: Behavior normal.            Primary Care Physician   Simin Sanderson    Discharge Orders      Physical Therapy Referral      Reason for your hospital stay    You were admitted to the hospital secondary to embolic stroke and also underwent endarterectomy     Follow-up and recommended labs and tests     Follow up with primary care provider, Simin Sanderson, within 7 days for hospital follow- up.  The following labs/tests are recommended: BMP and CBC  Follow up with neurology in 4-6 weeks   F/U with Vascular surgery,  in 4 weeks .     Activity    Your activity upon discharge: activity as tolerated and no driving for today     Discharge Instructions    You are discharged on Plavix and  Xarelto , please start your Xarelto from tonight , please avoid taking any ASA and ibuprofen for some time as you are on Plavix and Xarelto , you can take tylenol for pain control , you will have a follow up with  in one month and follow up with neurology in 4-6 weeks     Full Code     Diet    Follow this diet upon discharge: Orders Placed This Encounter      Advance Diet as Tolerated: Regular Diet Adult         Significant Results and Procedures   Results for orders placed or performed during the hospital encounter of 10/07/21   MR Brain w/o & w Contrast    Narrative    EXAM: MR BRAIN W/O and W CONTRAST  LOCATION: [facility]  DATE/TIME: 10/7/2021 10:18 PM    INDICATION: Confusion and slurred speech  COMPARISON: 09/27/2013  CONTRAST: 10 mL Gadavist  TECHNIQUE: Routine multiplanar multisequence head MRI without and with intravenous contrast.    FINDINGS:  INTRACRANIAL CONTENTS: Multiple foci of diffusion restriction in the left insula and external capsule, the left frontal lobe white matter, and the centrum semiovale. The largest areas of diffusion restriction in the left centrum semiovale and left insula   and subinsular white matter measure 20 x 12 mm and 10 x 15 mm respectively. There is mild associated FLAIR hyperintensity, and findings are likely late acute to early subacute in age. No mass, acute hemorrhage, or extra-axial fluid collections.   Scattered nonspecific T2/FLAIR hyperintensities within the cerebral white matter most consistent with mild chronic microvascular ischemic change. Mild generalized cerebral atrophy. No hydrocephalus. Normal position of the cerebellar tonsils. No   pathologic contrast enhancement.    SELLA: No abnormality accounting for technique.    OSSEOUS STRUCTURES/SOFT TISSUES: Normal marrow signal. The major intracranial vascular flow voids are maintained.     ORBITS: Prior bilateral cataract surgery. Visualized portions of the orbits are otherwise  unremarkable.     SINUSES/MASTOIDS: Mucosal thickening primarily involving the ethmoid air cells. Question prior left mastoidectomy with scattered opacification of the bilateral mastoid air cells.       Impression    IMPRESSION:  1.  Multifocal small late acute to early subacute infarcts in the left MCA territory involving the left insula and subinsular white matter, the left frontal lobe white matter, and the left centrum semiovale.  2.  Mild generalized parenchymal volume loss and sequela of chronic microvascular ischemic disease.    Findings were discussed with Dr. Trierweiler at 2312 hours on 10/07/2021.       Head CT w/o contrast    Narrative    EXAM: CT HEAD W/O CONTRAST  LOCATION: [facility]  DATE/TIME: 10/8/2021 12:58 AM    INDICATION: Neuro deficit, acute, stroke suspected, slurred speech, left facial droop, confusion   COMPARISON: MRI brain 10/07/2021  TECHNIQUE: Routine CT Head without IV contrast. Multiplanar reformats. Dose reduction techniques were used.    FINDINGS:  INTRACRANIAL CONTENTS: No acute intracranial hemorrhage, extraaxial collection, or mass effect.  Left frontal lobe subcortical white matter, left mid insula, and left lentiform nucleus demonstrate patchy acute infarcts when correlated with MRI brain   10/07/2021. Left circular sulcus M2 segment hyperdense artery sign. Mild presumed chronic small vessel ischemic changes. Normal ventricles and sulci.     VISUALIZED ORBITS/SINUSES/MASTOIDS: No intraorbital abnormality. No paranasal sinus mucosal disease. Right canal wall down mastoidectomy. Left mastoid air cells essentially clear.    BONES/SOFT TISSUES: No acute abnormality.      Impression    IMPRESSION:  1.  Left MCA territory small patchy acute infarcts described above redemonstrated from MRI brain 10/07/2021.  2.  No acute intracranial hemorrhage.  3.  No midline shift.  4.  Age-related changes described above.   CTA Head Neck with Contrast    Narrative     EXAM: CTA  HEAD NECK WITH CONTRAST  LOCATION: [facility]  DATE/TIME: 10/8/2021 12:59 AM    INDICATION: Stroke/TIA, assess intracranial arteries. Slurred speech. Left facial droop. Confusion.  COMPARISON: None.  CONTRAST: 70mL Isovue-370  TECHNIQUE: Head and neck CT angiogram with IV contrast. Axial helical CT images of the head and neck vessels obtained during the arterial phase of intravenous contrast administration. Axial 2D reconstructed images and multiplanar 3D MIP reconstructed   images of the head and neck vessels were performed by the technologist. Dose reduction techniques were used. All stenosis measurements made according to NASCET criteria unless otherwise specified.    FINDINGS:   HEAD CTA:  Left vertical and horizontal petrous ICA and left precavernous ICA moderate long segment stenosis. Left carotid siphon and left supraclinoid ICA long segment moderate stenosis. Left ICA at the skull base demonstrates mildly diminished enhancement   suggesting sluggish flow.    Left proximal to mid M2 segments superior division and mid division are occluded with reconstitution. Left MCA collaterals good.    Otherwise, no significant stenosis/occlusion. No brain aneurysm. No AVM/AVF.    Dominant right and smaller left vertebral artery contribute to a normal basilar artery.     DURAL VENOUS SINUSES: Not well evaluated on a technical basis.      NECK CTA:  RIGHT CAROTID: Atherosclerotic plaque results in less than 50% stenosis in the right bulb. No dissection.    LEFT CAROTID: Left proximal carotid bulb greater than 90% stenosis. Left mid to distal carotid bulb occlusion or near occlusion with reconstitution. Left cervical ICA is significantly diminutive with long segment moderate to severe stenosis. (NASCET   criteria cannot be calculated). Left cervical ICA long segment moderate to severe stenosis may be secondary to the proximal occlusion or near occlusion versus superimposed nonocclusive  dissection.    VERTEBRAL ARTERIES:  No significant stenosis/occlusion. No dissection.      Dominant right and smaller left vertebral arteries.    AORTIC ARCH: Classic aortic arch anatomy with no significant stenosis at the origin of the great vessels.    ARTERIAL PLAQUE: Calcified/noncalcified plaque bilateral carotid bifurcations/bulbs and bilateral carotid siphons.    NONVASCULAR STRUCTURES:   Dental amalgam resulting in streak artifact. Degenerative changes noted within the spine. Right canal wall down mastoidectomy.          Impression     IMPRESSION:   HEAD CTA:   1.  Left proximal to mid M2 segments superior division and mid division are occluded with reconstitution. Left MCA collaterals good.  2.  Left ICA at the skull base demonstrates long segment moderate stenosis described above. Left ICA at the skull base demonstrates mildly diminished enhancement suggesting sluggish flow.  3.  Otherwise, no significant stenosis/occlusion.    NECK CTA:  1.  Left proximal carotid bulb greater than 90% stenosis. Left mid to distal carotid bulb occlusion or near occlusion with reconstitution. Left cervical ICA is significantly diminutive with long segment moderate to severe stenosis. (NASCET criteria   cannot be calculated). Left cervical ICA long segment moderate to severe stenosis may be secondary to the proximal occlusion or near occlusion versus superimposed nonocclusive dissection.  2.  Atherosclerotic plaque results in less than 50% stenosis in the right bulb.  3.  Bilateral extradural vertebral arteries demonstrate no significant stenosis/occlusion or dissection.   US Carotid Bilateral    Narrative    BILATERAL CAROTID ULTRASOUND   10/8/2021 2:14 AM     HISTORY:  Severe stenosis of the left internal carotid artery on  recent CTA    COMPARISON: 10/8/2020    RIGHT CAROTID FINDINGS:  Plaque in the common carotid, carotid bulb  and internal carotid artery.  Right ICA PSV:  134  cm/sec.  Right ICA EDV:  47 cm/sec.  Right  ICA/CCA PSV Ratio:  1.3    These indicate  50 - 69%  diameter stenosis of the right ICA.    Right Vertebral: Antegrade flow.   Right ECA: Antegrade flow.     LEFT CAROTID FINDINGS:  Significant bulky atherosclerotic plaque at  the carotid bulb and proximal internal carotid artery with minimal  flow noted.  Left ICA PSV:  259  cm/sec.  Left ICA EDV:  89 cm/sec.  Left ICA/CCA PSV Ratio:  6.3    These indicate near occlusion of the left ICA.    Left Vertebral: Antegrade flow.   Left ECA: Antegrade flow.     Causes of Decreased Accuracy:   None.       Impression    IMPRESSION:    1. 50-69% diameter stenosis of the right ICA relative to the distal  ICA diameter, by ultrasound velocity criteria.  2. Near occlusion of the left carotid bulb/proximal internal carotid  artery by ultrasound velocity criteria.    Agree with the preliminary interpretation Dictated By: Marcio Mai MD on 10/8/2021 2:58 AM    KAMILLE CLEVELAND DO         SYSTEM ID:  [id number]    Intraoperative    Narrative    This exam was marked as non-reportable because it will not be read by a   radiologist or a [place] non-radiologist provider.         Echocardiogram Complete with Bubble Study    Narrative    [id number]  [id number]  [id number]  [id number]^CONSUELO^NICOL     [facility]  Echocardiography Laboratory  [address] [place] [address]     Name: BONNIE SNEED  MRN: [id number]  : 1955  Study Date: 10/08/2021 08:27 AM  Age: 66 yrs  Gender: Male  Patient Location: [facility]  Reason For Study: Cardiac Thrombus  Ordering Physician: NICOL CORDERO  Performed By: Ophleia Dior     BSA: 2.2 m2  Height: 70 in  Weight: 229 lb  BP: 173/91 mmHg  ______________________________________________________________________________  Procedure  Complete Portable Bubble Echo Adult. Optison (NDC #[id number]) given  intravenously.  ______________________________________________________________________________  Interpretation Summary     This TTE echo is  nearly unreadable due to lung interference. No obvious clot  seen but a KESHA would be needed to exclude a cardiac source of embolism  LV not well seen, grossly normal but limited views  ______________________________________________________________________________  Left Ventricle  The left ventricle is normal in size. There is normal left ventricular wall  thickness. LV not well seen, grossly normal but limited views. Left  ventricular diastolic function is indeterminate. Regional wall motion  abnormalities cannot be excluded due to limited visualization.     Right Ventricle  The right ventricle is not well visualized.     Atria  The left atrium is mildly dilated. Right atrium not well visualized.     Mitral Valve  The mitral valve is not well visualized.     Tricuspid Valve  The tricuspid valve is not well visualized.     Aortic Valve  No hemodynamically significant valvular aortic stenosis.     Pulmonic Valve  The pulmonic valve is not well visualized.     Vessels  The aortic root is normal size.     Pericardium  The pericardium appears normal.     Rhythm  Sinus rhythm was noted.  ______________________________________________________________________________  MMode/2D Measurements & Calculations  IVSd: 0.74 cm  LVIDd: 5.4 cm  LVIDs: 3.1 cm  LVPWd: 0.91 cm  FS: 43.0 %  LV mass(C)d: 161.7 grams  LV mass(C)dI: 73.1 grams/m2  Ao root diam: 3.6 cm  LA dimension: 3.9 cm  asc Aorta Diam: 3.2 cm  LA/Ao: 1.1  RWT: 0.34     Doppler Measurements & Calculations  MV E max arin: 65.5 cm/sec  MV A max arin: 50.4 cm/sec  MV E/A: 1.3  MV dec slope: 242.5 cm/sec2  E/E' av.2  Lateral E/e': 6.3  Medial E/e': 6.1     ______________________________________________________________________________  Report approved by: Naya Monk 10/08/2021 09:59 AM               Discharge Medications   Current Discharge Medication List      START taking these medications    Details   acetaminophen (TYLENOL) 325 MG tablet Take 2 tablets (650  mg) by mouth every 4 hours as needed for other (For optimal non-opioid multimodal pain management to improve pain control.)    Associated Diagnoses: Acute embolic stroke (H); Carotid artery stenosis, symptomatic, left      clopidogrel (PLAVIX) 75 MG tablet Take 1 tablet (75 mg) by mouth daily  Qty: 30 tablet, Refills: 1    Comments: Future refills by PCP Dr. Simin Sanderson with phone number [phone].  Associated Diagnoses: Acute embolic stroke (H); Carotid artery stenosis, symptomatic, left         CONTINUE these medications which have CHANGED    Details   atorvastatin (LIPITOR) 80 MG tablet 1 tablet (80 mg) by Oral or NG Tube route daily  Qty: 30 tablet, Refills: 0    Comments: Future refills by PCP Dr. Simin Sanderson with phone number [phone].  Associated Diagnoses: Acute embolic stroke (H); Carotid artery stenosis, symptomatic, left         CONTINUE these medications which have NOT CHANGED    Details   BYSTOLIC 10 mg tablet [BYSTOLIC 10 MG TABLET] TAKE 1 TABLET BY MOUTH DAILY FOR BLOOD PRESSURE  Qty: 90 tablet, Refills: 0    Associated Diagnoses: Benign hypertension      metFORMIN (GLUCOPHAGE) 500 MG tablet Take 1,500 mg by mouth daily      omeprazole (PRILOSEC) 20 MG capsule [OMEPRAZOLE (PRILOSEC) 20 MG CAPSULE] Take 20 mg by mouth daily.      rivaroxaban ANTICOAGULANT (XARELTO) 20 MG TABS tablet Take 1 tablet (20 mg) by mouth daily (with dinner)  Qty: 90 tablet, Refills: 0    Associated Diagnoses: PAF (paroxysmal atrial fibrillation) (H)      sitagliptin (JANUVIA) 100 MG tablet Take 100 mg by mouth daily      TEKTURNA 150 mg tablet [TEKTURNA 150 MG TABLET] TAKE 1 TABLET BY MOUTH ONCE DAILY FOR BLOOD PRESSURE  Qty: 90 tablet, Refills: 3    Associated Diagnoses: HTN (hypertension)      oxyCODONE (ROXICODONE) 5 MG tablet Take 1 tablet (5 mg) by mouth every 6 hours as needed for pain  Qty: 12 tablet, Refills: 0    Associated Diagnoses: Carotid artery stenosis, symptomatic, left           Allergies   No Known  Allergies

## 2021-10-10 NOTE — PLAN OF CARE
Pt. Alert and oriented x4. Vital signs stable on RA, ex slight HTN- pt was resumed on home BP meds this AM prior to DC. Assist of 1 with gb. Tolerating reg diet. Lung sounds dim. Bowel sounds hypoactive, + flatus. BM yesterday, adequate urine output. Neck incision CDI and well approximated. Dressings to SANDRA drain removal site intact, and art line removal site. Denies pain. Denies nausea. Pt discharging to daughters home in [place]. Ex wife and other daughter present for discharge instruction. Meds and instructions reviewed prior to discharge

## 2021-10-10 NOTE — PROGRESS NOTES
Brief Stroke Team Progress Note    66M HTN, HLD, DM2, CAD, Afib on Xarelto who presented to ED with recurrent pre-syncopal events found to have LMCA territory infarcts due to left Carotid Stenosis  >90% now s/p LCEA. Stable exam.    Discussed discharge antiplatelet/AC regimen with Dr. Holden and Dr. Mayo. Ok to discharge patient on Xarelto indefinitely (for Afib) and Plavix 75mg daily for one month. Follow up in Stroke Clinic in 4-6 weeks and with Dr. Mayo in one month.    P2Y12 assay reviewed: 108 --> Therapeutic.     Stroke team will sign-off at this time.     Karsten Conte MD, MPH  Vascular Neurology Fellow  Department of Neurology  Pager: [phone]

## 2021-10-10 NOTE — PROGRESS NOTES
Preoperative day 1 from left carotid endarterectomy for severe symptomatic stenosis.  No complaints.    He has been able to eat without any problems.    On examination: He appears comfortable and is in no acute distress.  He is moving both upper and lower extremities with equal 5 out of 5 power.  There is slight crookedness to his smile on the left side.  Tongue is midline.  No neck hematoma.    Blood pressure slightly elevated but he is not been on his baseline Bystolic.  This was started at 10 mg daily.    He is okay to discharge home.  I have removed his drain.  He will go home on baby aspirin, Xarelto which is his home regimen.  He would be on clopidogrel 75 mg daily for 1 month.  He is going home to his daughter who live and [place] and is a nurse by profession.  He will follow-up with me in clinic on October 25, 2021.

## 2021-10-10 NOTE — PLAN OF CARE
Speech Language Therapy Discharge Summary    Reason for therapy discharge:    Discharged to home.    Progress towards therapy goal(s). See goals on Care Plan in [facility] electronic health record for goal details.  Goals partially met.  Barriers to achieving goals:   discharge from facility.    Therapy recommendation(s):    Continued therapy is recommended.  Rationale/Recommendations:  Patient would benefit from short term ST needs for dysphagia management to ensure diet tolerance of regular textures and thin liquids .Complete a speech/language if patient having high level issues. None noted at bedside.

## 2021-10-11 NOTE — PLAN OF CARE
Physical Therapy Discharge Summary    Reason for therapy discharge:    Discharged to home with outpatient therapy.    Progress towards therapy goal(s). See goals on Care Plan in [facility] electronic health record for goal details.  Goals partially met.  Barriers to achieving goals:   discharge from facility.    Therapy recommendation(s):    Continued therapy is recommended.  Rationale/Recommendations:  outpatient PT to further address deficits and optimize functional recovery.

## 2021-10-14 ENCOUNTER — LAB REQUISITION (OUTPATIENT)
Dept: LAB | Facility: CLINIC | Age: 66
End: 2021-10-14
Payer: COMMERCIAL

## 2021-10-14 ENCOUNTER — OFFICE VISIT (OUTPATIENT)
Dept: CARDIOLOGY | Facility: CLINIC | Age: 66
End: 2021-10-14
Payer: COMMERCIAL

## 2021-10-14 ENCOUNTER — TELEPHONE (OUTPATIENT)
Dept: OTHER | Facility: CLINIC | Age: 66
End: 2021-10-14

## 2021-10-14 VITALS
OXYGEN SATURATION: 96 % | WEIGHT: 225 LBS | RESPIRATION RATE: 16 BRPM | BODY MASS INDEX: 32.28 KG/M2 | SYSTOLIC BLOOD PRESSURE: 118 MMHG | HEART RATE: 76 BPM | DIASTOLIC BLOOD PRESSURE: 70 MMHG

## 2021-10-14 DIAGNOSIS — I10 BENIGN ESSENTIAL HYPERTENSION: Primary | ICD-10-CM

## 2021-10-14 DIAGNOSIS — I25.10 ATHEROSCLEROSIS OF NATIVE CORONARY ARTERY OF NATIVE HEART WITHOUT ANGINA PECTORIS: ICD-10-CM

## 2021-10-14 DIAGNOSIS — I63.9 ACUTE EMBOLIC STROKE (H): ICD-10-CM

## 2021-10-14 DIAGNOSIS — Z98.890 S/P CAROTID ENDARTERECTOMY: ICD-10-CM

## 2021-10-14 DIAGNOSIS — I10 ESSENTIAL (PRIMARY) HYPERTENSION: ICD-10-CM

## 2021-10-14 DIAGNOSIS — I48.0 PAF (PAROXYSMAL ATRIAL FIBRILLATION) (H): ICD-10-CM

## 2021-10-14 LAB
ANION GAP SERPL CALCULATED.3IONS-SCNC: 11 MMOL/L (ref 5–18)
BUN SERPL-MCNC: 19 MG/DL (ref 8–22)
CALCIUM SERPL-MCNC: 9.3 MG/DL (ref 8.5–10.5)
CHLORIDE BLD-SCNC: 103 MMOL/L (ref 98–107)
CO2 SERPL-SCNC: 26 MMOL/L (ref 22–31)
CREAT SERPL-MCNC: 0.84 MG/DL (ref 0.7–1.3)
GFR SERPL CREATININE-BSD FRML MDRD: >90 ML/MIN/1.73M2
GLUCOSE BLD-MCNC: 160 MG/DL (ref 70–125)
POTASSIUM BLD-SCNC: 4 MMOL/L (ref 3.5–5)
SODIUM SERPL-SCNC: 140 MMOL/L (ref 136–145)

## 2021-10-14 PROCEDURE — 80048 BASIC METABOLIC PNL TOTAL CA: CPT | Mod: ORL | Performed by: PHYSICIAN ASSISTANT

## 2021-10-14 PROCEDURE — 99214 OFFICE O/P EST MOD 30 MIN: CPT | Performed by: INTERNAL MEDICINE

## 2021-10-14 RX ORDER — LISINOPRIL 10 MG/1
10 TABLET ORAL DAILY
COMMUNITY
Start: 2020-12-28 | End: 2021-10-14

## 2021-10-14 RX ORDER — DAPAGLIFLOZIN 10 MG/1
10 TABLET, FILM COATED ORAL DAILY
COMMUNITY
Start: 2021-09-22

## 2021-10-14 RX ORDER — METOPROLOL SUCCINATE 100 MG/1
100 TABLET, EXTENDED RELEASE ORAL DAILY
COMMUNITY
Start: 2021-09-22 | End: 2021-10-14

## 2021-10-14 NOTE — PROGRESS NOTES
[facility]  [phone]    .      Assessment/Recommendations   Patient with known history of paroxysmal atrial fibrillation, on Xarelto, and mild coronary artery disease by previous coronary angiography.  He presented recently with a neurological event, CVA, and had severe lesion in his left carotid.  It was felt to be an embolic event and he had a carotid endarterectomy during the same hospitalization at [facility].  He is doing well at this time.  He is not taking lisinopril metoprolol but his blood pressure is excellent.  I have asked him to check his blood pressure on a daily basis and keep us posted in the next 2 weeks regarding his blood pressure to see if we need to wean back on his antihypertensive medications.    We talked about his lipids and being very aggressive and his our atorvastatin was increased from 40 to 80 mg a day which I fully support.  I would drive his LDL cholesterol well below 70.    I have not change his medications today but do anticipate he will need to go back on more antihypertensive medications as time goes on.    We talked about exercise and I have recommended that he start with 30 minutes of walking at least 5 times each week.    Because of his blood pressure and recent events I would like to see him back in 3 months, and if stable would go back to once a year thereafter.    Thank you for allowing us to participate in his care.       History of Present Illness/Subjective    Mr. Reggie Roper is a 66 year old male with known mild coronary artery disease by angiography and a history of paroxysmal atrial fibrillation and hypertension.  He recently presented with a neurological event and a severe left carotid stenosis and had carotid endarterectomy just last week.  He is recovering nicely from the procedure but is still a little days from all of it.  His breathing is comfortable, he denies any chest discomfort and did not have any palpitations.   No A. fib was reported in the discharge summary during his recent hospitalization.    He denies peripheral edema.    ECG: Personally reviewed.  October 7 ECG is reviewed and shows sinus bradycardia 51 bpm no ST-T wave changes.       Physical Examination Review of Systems   /70 (BP Location: Left arm, Patient Position: Sitting, Cuff Size: Adult Regular)   Pulse 76   Resp 16   Wt 102.1 kg (225 lb)   SpO2 96%   BMI 32.28 kg/m    Body mass index is 32.28 kg/m .  Wt Readings from Last 3 Encounters:   10/14/21 102.1 kg (225 lb)   10/07/21 103.9 kg (229 lb)   10/02/20 105.7 kg (233 lb)     General Appearance:   Alert, cooperative and in no acute distress.   ENT/Mouth: Patient wearing a mask.      EYES:  no scleral icterus, normal conjunctivae   Neck: JVP normal. No Hepatojugular reflux. Thyroid not visualized.   Chest/Lungs:   Lungs are clear to auscultation, equal chest wall expansion.   Cardiovascular:   S1, S2 without murmur ,clicks or rubs. Brachial, radial and posterior tibial pulses are intact .  Left posterior tibial pulses mildly diminished when compared to the right.     Abdomen:  Nontender. BS+.    Extremities: No cyanosis, clubbing or edema   Skin: no xanthelasma, warm.    Neurologic: normal arm movement bilateral, no tremors     Psychiatric: Appropriate affect.      Enc Vitals  BP: 118/70  Pulse: 76  Resp: 16  SpO2: 96 %  Weight: 102.1 kg (225 lb)                                           Medical History  Surgical History Family History Social History   Past Medical History:   Diagnosis Date     Abnormal stress test      Chest pain     Midsternal Pain     Diabetes mellitus (H)      Hyperlipidemia      Hypertension      Shortness of breath on exertion     Past Surgical History:   Procedure Laterality Date     CATARACT EXTRACTION       ENDARTERECTOMY CAROTID Left 10/9/2021    Procedure: LEFT CAROTID ENDARTERECTOMY;  Surgeon: Jaycob Mayo MD;  Location:  OR    Family History   Problem  Relation Age of Onset     Heart Disease Father      CABG Father 78.00    Social History     Socioeconomic History     Marital status:      Spouse name: Not on file     Number of children: Not on file     Years of education: Not on file     Highest education level: Not on file   Occupational History     Not on file   Tobacco Use     Smoking status: Former Smoker     Types: Cigars, Cigars     Smokeless tobacco: Never Used   Substance and Sexual Activity     Alcohol use: Yes     Comment: Alcoholic Drinks/day: occasional     Drug use: No     Sexual activity: Not on file   Other Topics Concern     Not on file   Social History Narrative     Not on file     Social Determinants of Health     Financial Resource Strain:      Difficulty of Paying Living Expenses:    Food Insecurity:      Worried About Running Out of Food in the Last Year:      Ran Out of Food in the Last Year:    Transportation Needs:      Lack of Transportation (Medical):      Lack of Transportation (Non-Medical):    Physical Activity:      Days of Exercise per Week:      Minutes of Exercise per Session:    Stress:      Feeling of Stress :    Social Connections:      Frequency of Communication with Friends and Family:      Frequency of Social Gatherings with Friends and Family:      Attends Christian Services:      Active Member of Clubs or Organizations:      Attends Club or Organization Meetings:      Marital Status:    Intimate Partner Violence:      Fear of Current or Ex-Partner:      Emotionally Abused:      Physically Abused:      Sexually Abused:           Medications  Allergies   Current Outpatient Medications   Medication Sig Dispense Refill     acetaminophen (TYLENOL) 325 MG tablet Take 2 tablets (650 mg) by mouth every 4 hours as needed for other (For optimal non-opioid multimodal pain management to improve pain control.)       atorvastatin (LIPITOR) 80 MG tablet 1 tablet (80 mg) by Oral or NG Tube route daily 30 tablet 0     BYSTOLIC 10 mg  tablet [BYSTOLIC 10 MG TABLET] TAKE 1 TABLET BY MOUTH DAILY FOR BLOOD PRESSURE 90 tablet 0     clopidogrel (PLAVIX) 75 MG tablet Take 1 tablet (75 mg) by mouth daily 30 tablet 1     dapagliflozin (FARXIGA) 10 MG TABS tablet Take 10 mg by mouth daily       lisinopril (ZESTRIL) 10 MG tablet Take 10 mg by mouth daily       metFORMIN (GLUCOPHAGE) 500 MG tablet Take 1,500 mg by mouth daily       metoprolol succinate ER (TOPROL-XL) 100 MG 24 hr tablet Take 100 mg by mouth daily       omeprazole (PRILOSEC) 20 MG capsule [OMEPRAZOLE (PRILOSEC) 20 MG CAPSULE] Take 20 mg by mouth daily.       oxyCODONE (ROXICODONE) 5 MG tablet Take 1 tablet (5 mg) by mouth every 6 hours as needed for pain 12 tablet 0     rivaroxaban ANTICOAGULANT (XARELTO) 20 MG TABS tablet Take 1 tablet (20 mg) by mouth daily (with dinner) 90 tablet 0     sitagliptin (JANUVIA) 100 MG tablet Take 100 mg by mouth daily       TEKTURNA 150 mg tablet [TEKTURNA 150 MG TABLET] TAKE 1 TABLET BY MOUTH ONCE DAILY FOR BLOOD PRESSURE 90 tablet 3    Allergies   Allergen Reactions     Amlodipine      Other reaction(s): Edema, leg swelling at high dose  Leg swelling at high doses       Hydrochlorothiazide      Other reaction(s): hypokalemia, Hypokalemia     Lisinopril Cough     Other reaction(s): cough     Losartan      Other reaction(s): face flushing, Flushing         Lab Results    Chemistry/lipid CBC Cardiac Enzymes/BNP/TSH/INR   Lab Results   Component Value Date    CHOL 142 10/07/2021    HDL 40 10/07/2021    TRIG 107 10/07/2021    BUN 19 10/09/2021     10/09/2021    CO2 25 10/09/2021    Lab Results   Component Value Date    WBC 5.9 10/09/2021    HGB 14.8 10/09/2021    HCT 44.2 10/09/2021    MCV 94 10/09/2021     (L) 10/09/2021    Lab Results   Component Value Date    TSH 1.79 10/07/2021

## 2021-10-14 NOTE — LETTER
10/14/2021    Simin Sanderson MD  [facility] [address] [address]    RE: Reggie Roper       Dear Colleague,    I had the pleasure of seeing Reggie Roper in the [facility].        [facility]  [phone]    .      Assessment/Recommendations   Patient with known history of paroxysmal atrial fibrillation, on Xarelto, and mild coronary artery disease by previous coronary angiography.  He presented recently with a neurological event, CVA, and had severe lesion in his left carotid.  It was felt to be an embolic event and he had a carotid endarterectomy during the same hospitalization at [facility].  He is doing well at this time.  He is not taking lisinopril metoprolol but his blood pressure is excellent.  I have asked him to check his blood pressure on a daily basis and keep us posted in the next 2 weeks regarding his blood pressure to see if we need to wean back on his antihypertensive medications.    We talked about his lipids and being very aggressive and his our atorvastatin was increased from 40 to 80 mg a day which I fully support.  I would drive his LDL cholesterol well below 70.    I have not change his medications today but do anticipate he will need to go back on more antihypertensive medications as time goes on.    We talked about exercise and I have recommended that he start with 30 minutes of walking at least 5 times each week.    Because of his blood pressure and recent events I would like to see him back in 3 months, and if stable would go back to once a year thereafter.    Thank you for allowing us to participate in his care.       History of Present Illness/Subjective    Mr. Reggie Roper is a 66 year old male with known mild coronary artery disease by angiography and a history of paroxysmal atrial fibrillation and hypertension.  He recently presented with a neurological event  and a severe left carotid stenosis and had carotid endarterectomy just last week.  He is recovering nicely from the procedure but is still a little days from all of it.  His breathing is comfortable, he denies any chest discomfort and did not have any palpitations.  No A. fib was reported in the discharge summary during his recent hospitalization.    He denies peripheral edema.    ECG: Personally reviewed.  October 7 ECG is reviewed and shows sinus bradycardia 51 bpm no ST-T wave changes.       Physical Examination Review of Systems   /70 (BP Location: Left arm, Patient Position: Sitting, Cuff Size: Adult Regular)   Pulse 76   Resp 16   Wt 102.1 kg (225 lb)   SpO2 96%   BMI 32.28 kg/m    Body mass index is 32.28 kg/m .  Wt Readings from Last 3 Encounters:   10/14/21 102.1 kg (225 lb)   10/07/21 103.9 kg (229 lb)   10/02/20 105.7 kg (233 lb)     General Appearance:   Alert, cooperative and in no acute distress.   ENT/Mouth: Patient wearing a mask.      EYES:  no scleral icterus, normal conjunctivae   Neck: JVP normal. No Hepatojugular reflux. Thyroid not visualized.   Chest/Lungs:   Lungs are clear to auscultation, equal chest wall expansion.   Cardiovascular:   S1, S2 without murmur ,clicks or rubs. Brachial, radial and posterior tibial pulses are intact .  Left posterior tibial pulses mildly diminished when compared to the right.     Abdomen:  Nontender. BS+.    Extremities: No cyanosis, clubbing or edema   Skin: no xanthelasma, warm.    Neurologic: normal arm movement bilateral, no tremors     Psychiatric: Appropriate affect.      Enc Vitals  BP: 118/70  Pulse: 76  Resp: 16  SpO2: 96 %  Weight: 102.1 kg (225 lb)                                           Medical History  Surgical History Family History Social History   Past Medical History:   Diagnosis Date     Abnormal stress test      Chest pain     Midsternal Pain     Diabetes mellitus (H)      Hyperlipidemia      Hypertension      Shortness of breath  on exertion     Past Surgical History:   Procedure Laterality Date     CATARACT EXTRACTION       ENDARTERECTOMY CAROTID Left 10/9/2021    Procedure: LEFT CAROTID ENDARTERECTOMY;  Surgeon: Jaycob Mayo MD;  Location:  OR    Family History   Problem Relation Age of Onset     Heart Disease Father      CABG Father 78.00    Social History     Socioeconomic History     Marital status:      Spouse name: Not on file     Number of children: Not on file     Years of education: Not on file     Highest education level: Not on file   Occupational History     Not on file   Tobacco Use     Smoking status: Former Smoker     Types: Cigars, Cigars     Smokeless tobacco: Never Used   Substance and Sexual Activity     Alcohol use: Yes     Comment: Alcoholic Drinks/day: occasional     Drug use: No     Sexual activity: Not on file   Other Topics Concern     Not on file   Social History Narrative     Not on file     Social Determinants of Health     Financial Resource Strain:      Difficulty of Paying Living Expenses:    Food Insecurity:      Worried About Running Out of Food in the Last Year:      Ran Out of Food in the Last Year:    Transportation Needs:      Lack of Transportation (Medical):      Lack of Transportation (Non-Medical):    Physical Activity:      Days of Exercise per Week:      Minutes of Exercise per Session:    Stress:      Feeling of Stress :    Social Connections:      Frequency of Communication with Friends and Family:      Frequency of Social Gatherings with Friends and Family:      Attends Congregation Services:      Active Member of Clubs or Organizations:      Attends Club or Organization Meetings:      Marital Status:    Intimate Partner Violence:      Fear of Current or Ex-Partner:      Emotionally Abused:      Physically Abused:      Sexually Abused:           Medications  Allergies   Current Outpatient Medications   Medication Sig Dispense Refill     acetaminophen (TYLENOL) 325 MG tablet Take  2 tablets (650 mg) by mouth every 4 hours as needed for other (For optimal non-opioid multimodal pain management to improve pain control.)       atorvastatin (LIPITOR) 80 MG tablet 1 tablet (80 mg) by Oral or NG Tube route daily 30 tablet 0     BYSTOLIC 10 mg tablet [BYSTOLIC 10 MG TABLET] TAKE 1 TABLET BY MOUTH DAILY FOR BLOOD PRESSURE 90 tablet 0     clopidogrel (PLAVIX) 75 MG tablet Take 1 tablet (75 mg) by mouth daily 30 tablet 1     dapagliflozin (FARXIGA) 10 MG TABS tablet Take 10 mg by mouth daily       lisinopril (ZESTRIL) 10 MG tablet Take 10 mg by mouth daily       metFORMIN (GLUCOPHAGE) 500 MG tablet Take 1,500 mg by mouth daily       metoprolol succinate ER (TOPROL-XL) 100 MG 24 hr tablet Take 100 mg by mouth daily       omeprazole (PRILOSEC) 20 MG capsule [OMEPRAZOLE (PRILOSEC) 20 MG CAPSULE] Take 20 mg by mouth daily.       oxyCODONE (ROXICODONE) 5 MG tablet Take 1 tablet (5 mg) by mouth every 6 hours as needed for pain 12 tablet 0     rivaroxaban ANTICOAGULANT (XARELTO) 20 MG TABS tablet Take 1 tablet (20 mg) by mouth daily (with dinner) 90 tablet 0     sitagliptin (JANUVIA) 100 MG tablet Take 100 mg by mouth daily       TEKTURNA 150 mg tablet [TEKTURNA 150 MG TABLET] TAKE 1 TABLET BY MOUTH ONCE DAILY FOR BLOOD PRESSURE 90 tablet 3    Allergies   Allergen Reactions     Amlodipine      Other reaction(s): Edema, leg swelling at high dose  Leg swelling at high doses       Hydrochlorothiazide      Other reaction(s): hypokalemia, Hypokalemia     Lisinopril Cough     Other reaction(s): cough     Losartan      Other reaction(s): face flushing, Flushing         Lab Results    Chemistry/lipid CBC Cardiac Enzymes/BNP/TSH/INR   Lab Results   Component Value Date    CHOL 142 10/07/2021    HDL 40 10/07/2021    TRIG 107 10/07/2021    BUN 19 10/09/2021     10/09/2021    CO2 25 10/09/2021    Lab Results   Component Value Date    WBC 5.9 10/09/2021    HGB 14.8 10/09/2021    HCT 44.2 10/09/2021    MCV 94  10/09/2021     (L) 10/09/2021    Lab Results   Component Value Date    TSH 1.79 10/07/2021                                               Thank you for allowing me to participate in the care of your patient.      Sincerely,     Nino Salazar MD      [facility]  cc:   Roro Romero, APRN CNP  [address] [address]

## 2021-10-15 ENCOUNTER — TELEPHONE (OUTPATIENT)
Dept: OTHER | Facility: CLINIC | Age: 66
End: 2021-10-15

## 2021-11-01 ENCOUNTER — OFFICE VISIT (OUTPATIENT)
Dept: OTHER | Facility: CLINIC | Age: 66
End: 2021-11-01
Attending: SURGERY
Payer: COMMERCIAL

## 2021-11-01 VITALS — DIASTOLIC BLOOD PRESSURE: 71 MMHG | SYSTOLIC BLOOD PRESSURE: 112 MMHG | HEART RATE: 63 BPM

## 2021-11-01 DIAGNOSIS — I65.22 SYMPTOMATIC CAROTID ARTERY STENOSIS, LEFT: Primary | ICD-10-CM

## 2021-11-01 DIAGNOSIS — I65.22 CAROTID ARTERY STENOSIS, SYMPTOMATIC, LEFT: Primary | ICD-10-CM

## 2021-11-01 PROCEDURE — 99024 POSTOP FOLLOW-UP VISIT: CPT | Performed by: SURGERY

## 2021-11-01 NOTE — PROGRESS NOTES
[facility]        Patient is here for a postop follow up.      Pt is currently taking Statin, Plavix and Xarelto.    /71 (BP Location: Right arm, Patient Position: Chair, Cuff Size: Adult Large)   Pulse 63     The provider has been notified that the patient has no concerns.     Questions patient would like addressed today are: N/A.    Refills are needed: N/A    Has homecare services and agency name:  Parul Sanabria MA

## 2021-11-01 NOTE — PROGRESS NOTES
Mr. Roper is a very pleasant 66-year-old male with symptomatic left carotid stenosis.  He underwent left carotid endarterectomy on 9 October 2021.  He complains of hoarseness.    His left neck incision is healing well.  Both upper and lower extremities have been moved with equal 5 out of 5 power.  Tongue is midline.    He is recovering well from his left carotid endarterectomy.  I am worried about the hoarseness and potential injury to the vagus nerve or the recurrent laryngeal nerve.  I have requested Dr. Catherine at the [facility] to evaluate him for the same.    We will see him back in 3 months with left carotid duplex sonography for surveillance.

## 2021-11-02 ENCOUNTER — TELEPHONE (OUTPATIENT)
Dept: OTOLARYNGOLOGY | Facility: CLINIC | Age: 66
End: 2021-11-02

## 2021-11-02 NOTE — TELEPHONE ENCOUNTER
[facility] Call Center    Phone Message    May a detailed message be left on voicemail: no     Reason for Call: Appointment Intake    Referring Provider Name: Jaycob Mayo MD in  [facility]  Diagnosis and/or Symptoms: Carotid artery stenosis, symptomatic, left [I65.22]    Action Taken: Message routed to:  [facility] ([facility] [[id number]] - unable to locate dx in protos    Travel Screening: Not Applicable

## 2021-11-03 NOTE — TELEPHONE ENCOUNTER
Left vm message for patient in regards to scheduling appointment with provider. Writers direct dial number provided on vm for call back

## 2021-11-05 DIAGNOSIS — I48.0 PAF (PAROXYSMAL ATRIAL FIBRILLATION) (H): ICD-10-CM

## 2021-11-24 ENCOUNTER — HOSPITAL ENCOUNTER (OUTPATIENT)
Dept: PHYSICAL THERAPY | Facility: CLINIC | Age: 66
Setting detail: THERAPIES SERIES
End: 2021-11-24
Attending: FAMILY MEDICINE
Payer: COMMERCIAL

## 2021-11-24 PROCEDURE — 97161 PT EVAL LOW COMPLEX 20 MIN: CPT | Mod: GP | Performed by: PHYSICAL THERAPIST

## 2021-11-24 ASSESSMENT — 6 MINUTE WALK TEST (6MWT): TOTAL DISTANCE WALKED (FT): 1410

## 2021-11-24 NOTE — PROGRESS NOTES
11/24/21 0900   Quick Adds   Type of Visit Initial OP PT Evaluation   General Information   Start of Care Date 11/24/21   Referring Physician Eulogio Holden MD   Orders Evaluate and Treat as Indicated   Order Date 10/10/21   Medical Diagnosis Acute embolic stroke (H); Carotid artery stenosis, symptomatic, left   Onset of illness/injury or Date of Surgery 10/07/21   Precautions/Limitations no known precautions/limitations   Surgical/Medical history reviewed Yes   Pertinent history of current problem (include personal factors and/or comorbidities that impact the POC) Reggie Roper is a 66 year old male with past medical history significant for essential hypertension, hyperlipidemia, type 2 diabetes mellitus, coronary artery disease, paroxysmal atrial fibrillation anticoagulated with Xarelto who presented to the ED on October 7, 2021. Patient was found to have acute ischemic stroke of multifocal small infarcts and was admitted for further evaluation and management. Patient is now s/p left carotid endarterectomy. Pt returned to part-time work last week and full-time work this week. No complaints in terms of balance, endurance, or strength.   Patient role/Employment history Employed   Living environment Apartment/condo   Home/Community Accessibility Comments Pt lives alone in  a second floor apartment with greater than 10 stairs to enter.   Patient/Family Goals Statement Return to PLOF   Fall Risk Screen   Fall screen completed by PT   Have you fallen 2 or more times in the past year? No   Have you fallen and had an injury in the past year? No   Is patient a fall risk? No   Abuse Screen (yes response referral indicated)   Feels Unsafe at Home or Work/School no   Feels Threatened by Someone no   Does Anyone Try to Keep You From Having Contact with Others or Doing Things Outside Your Home? no   Physical Signs of Abuse Present no   Vitals Signs   Heart Rate 61   SpO2 99   Blood Pressure 156/95   Vital Signs  "Comments Pt did not take BP medication today.   Cognitive Status Examination   Orientation orientation to person, place and time   Level of Consciousness alert   Posture   Posture Forward head position;Protracted shoulders   Range of Motion (ROM)   ROM Comment WFL   Strength   Strength Comments WFL   Transfer Skills   Transfer Comments IND with sit<>stand transfers.   Gait   Gait Comments Pt ambulates with reciprocal gait pattern and normal speed. Forward head position and slight trunk flexion noted with ambulation.   Gait Special Tests   Gait Special Tests 25 FOOT TIMED WALK;FUNCTIONAL GAIT ASSESSMENT;SIX MINUTE WALK TEST   Gait Special Tests 25 Foot Timed Walk   Seconds 5.72 seconds   Steps 12 Steps   Comments 1.33 m/s   Gait Special Tests Functional Gait Assessment Score out of 30   Score out of 30 25/30   Comments < 22/20 indicates increased risk for falls.   Gait Special Tests Six Minute Walk Test   Feet 1410 Feet   Comments No AD, walking laps   Balance Special Tests   Balance Special Tests Sit to stand reps   Balance Special Tests Sit to Stand Reps in 30 Seconds   Reps in 30 seconds 14   Height 18\"   Comments No UE A   Clinical Impression   Criteria for Skilled Therapeutic Interventions Met evaluation only   Clinical Presentation Evolving/Changing   Clinical Presentation Rationale PMH, vital signs, 30\" chair stand, gait speed, FGA, 6MWT   Clinical Decision Making (Complexity) Low complexity   Risk & Benefits of therapy have been explained Yes   Patient, Family & other staff in agreement with plan of care Yes   Clinical Impression Comments Pt is a 65 y/o male with recent history of acute ischemic stroke and left carotid endarterectomy. Pt has no complaints related to balance, endurance, or strength. Pt scored WNL for age and gender norms on the 30\" chair stand and 6MWT. Pt's scores for gait speed and FGA indicate pt is not at increased risk for falls. Educated pt on starting a walking program IND to improve " cardiovascular fitness.   Education Assessment   Preferred Learning Style Listening;Demonstration   Barriers to Learning No barriers   Total Evaluation Time   PT Eval, Low Complexity Minutes ([id number]) 70

## 2021-11-29 ENCOUNTER — OFFICE VISIT (OUTPATIENT)
Dept: OTOLARYNGOLOGY | Facility: CLINIC | Age: 66
End: 2021-11-29
Payer: COMMERCIAL

## 2021-11-29 ENCOUNTER — TELEPHONE (OUTPATIENT)
Dept: OTOLARYNGOLOGY | Facility: CLINIC | Age: 66
End: 2021-11-29

## 2021-11-29 ENCOUNTER — PRE VISIT (OUTPATIENT)
Dept: OTOLARYNGOLOGY | Facility: CLINIC | Age: 66
End: 2021-11-29

## 2021-11-29 ENCOUNTER — VIRTUAL VISIT (OUTPATIENT)
Dept: OTOLARYNGOLOGY | Facility: CLINIC | Age: 66
End: 2021-11-29
Payer: COMMERCIAL

## 2021-11-29 VITALS
WEIGHT: 224 LBS | SYSTOLIC BLOOD PRESSURE: 141 MMHG | OXYGEN SATURATION: 99 % | BODY MASS INDEX: 32.07 KG/M2 | HEART RATE: 58 BPM | DIASTOLIC BLOOD PRESSURE: 77 MMHG | HEIGHT: 70 IN

## 2021-11-29 DIAGNOSIS — J38.01 UNILATERAL PARTIAL VOCAL CORD PARALYSIS: ICD-10-CM

## 2021-11-29 DIAGNOSIS — Z98.890 S/P CAROTID ENDARTERECTOMY: ICD-10-CM

## 2021-11-29 DIAGNOSIS — R49.0 DYSPHONIA: Primary | ICD-10-CM

## 2021-11-29 PROCEDURE — 31579 LARYNGOSCOPY TELESCOPIC: CPT | Performed by: OTOLARYNGOLOGY

## 2021-11-29 PROCEDURE — 99204 OFFICE O/P NEW MOD 45 MIN: CPT | Mod: 25 | Performed by: OTOLARYNGOLOGY

## 2021-11-29 PROCEDURE — 92524 BEHAVRAL QUALIT ANALYS VOICE: CPT | Mod: GN | Performed by: SPEECH-LANGUAGE PATHOLOGIST

## 2021-11-29 ASSESSMENT — PAIN SCALES - GENERAL: PAINLEVEL: NO PAIN (0)

## 2021-11-29 ASSESSMENT — MIFFLIN-ST. JEOR: SCORE: 1797.31

## 2021-11-29 NOTE — NURSING NOTE
"Chief Complaint   Patient presents with     Consult     new patient       Pulse 58, height 1.778 m (5' 10\"), SpO2 99 %.    Lucian Hastings, EMT  "

## 2021-11-29 NOTE — LETTER
11/29/2021      RE: Reggie Roper  [address]  [facility] [address]       Dear Dr. Mayo:    I had the pleasure of meeting Reggie Roper in consultation at the [facility] of the [facility] at your request, for evaluation of dysphonia. The note from our visit follows. Speech recognition software may have been used in the documentation below; input is reviewed before signature to the best of my ability. I appreciate the opportunity to participate in the care of this pleasant patient.    Please feel free to contact me with any questions.    Sincerely yours,    Ophelia Ngo M.D., M.P.H.  , Laryngology  Director, [facility]  Otolaryngology- Head & Neck Surgery  [phone]          =====    HISTORY OF PRESENT ILLNESS:   Reggie Roper is a pleasant 66 year old male with a history of carotid stenosis and left CEA who presents with a nearly two month history of dysphonia.     Voice  He reports that his voice has been different from usual voice since left CEA 10/9/21 for symptomatic left carotid stenosis.  Voice is lower and less strong. Sometimes has word finding challenges, but mostly it is a projection issue. Voice has been entirely stable, not improving, not worsening. Works in warehouse operations, night shift. Not much vocal demand.    Swallowing  No concerns. No trouble with liquids. No recent pneumonias.    Cough/Throat-clearing  Has an inconsistent cough, not associated with oral intake.    Breathing  No concerns.     Throat discomfort  No concerns.     Reflux-type symptoms  He experiences heartburn/indigestion: never. He is taking reflux medications.      Prior Epic/outside records were reviewed for this visit, including:  Jaycob Mayo MD 11/1/21      MEDICATIONS:     Current Outpatient Medications   Medication Sig Dispense Refill     acetaminophen (TYLENOL) 325 MG tablet Take 2 tablets (650 mg)  by mouth every 4 hours as needed for other (For optimal non-opioid multimodal pain management to improve pain control.)       atorvastatin (LIPITOR) 80 MG tablet 1 tablet (80 mg) by Oral or NG Tube route daily 30 tablet 0     BYSTOLIC 10 mg tablet [BYSTOLIC 10 MG TABLET] TAKE 1 TABLET BY MOUTH DAILY FOR BLOOD PRESSURE 90 tablet 0     clopidogrel (PLAVIX) 75 MG tablet Take 1 tablet (75 mg) by mouth daily 30 tablet 1     dapagliflozin (FARXIGA) 10 MG TABS tablet Take 10 mg by mouth daily       metFORMIN (GLUCOPHAGE) 500 MG tablet Take 1,500 mg by mouth daily       omeprazole (PRILOSEC) 20 MG capsule [OMEPRAZOLE (PRILOSEC) 20 MG CAPSULE] Take 20 mg by mouth daily.       rivaroxaban ANTICOAGULANT (XARELTO) 20 MG TABS tablet Take 1 tablet (20 mg) by mouth daily (with dinner) 90 tablet 1     sitagliptin (JANUVIA) 100 MG tablet Take 100 mg by mouth daily       TEKTURNA 150 mg tablet [TEKTURNA 150 MG TABLET] TAKE 1 TABLET BY MOUTH ONCE DAILY FOR BLOOD PRESSURE 90 tablet 3       ALLERGIES:    Allergies   Allergen Reactions     Amlodipine      Other reaction(s): Edema, leg swelling at high dose  Leg swelling at high doses       Hydrochlorothiazide      Other reaction(s): hypokalemia, Hypokalemia     Lisinopril Cough     Other reaction(s): cough     Losartan      Other reaction(s): face flushing, Flushing       PAST MEDICAL HISTORY:   Past Medical History:   Diagnosis Date     Abnormal stress test      Chest pain     Midsternal Pain     Diabetes mellitus (H)      Hyperlipidemia      Hypertension      Shortness of breath on exertion         PAST SURGICAL HISTORY:   Past Surgical History:   Procedure Laterality Date     CATARACT EXTRACTION       ENDARTERECTOMY CAROTID Left 10/9/2021    Procedure: LEFT CAROTID ENDARTERECTOMY;  Surgeon: Jaycob Mayo MD;  Location:  OR       HABITS/SOCIAL HISTORY:    Social History     Tobacco Use     Smoking status: Former Smoker     Types: Cigars, Cigars     Smokeless tobacco: Never  Used   Substance Use Topics     Alcohol use: Yes     Comment: Alcoholic Drinks/day: occasional       FAMILY HISTORY:    Family History   Problem Relation Age of Onset     Heart Disease Father      CABG Father 78.00       REVIEW OF SYSTEMS:  The patient completed a comprehensive 11 point review of systems (below), which was reviewed. Positives are as noted below; pertinent findings are as noted in the HPI.     Patient Supplied Answers to Review of Systems   Noncontributory      PHYSICAL EXAMINATION:  General: The patient was alert and conversant, and in no acute distress.    Eyes: PERRL, conjunctiva and lids normal, sclera nonicteric.  Nose: Anterior rhinoscopy: no gross abnormalities. no  bleeding; no  mucopurulence; septum grossly normal, mild mucoid drainage and/or crusting.  Oral cavity/oropharynx: No masses or lesions. Dentition in poor condition. Floor of mouth and oral tongue soft to palpation. Tongue mobility and palate elevation intact and symmetric.  Ears: Normal auricles, external auditory canals bilaterally. Visualized portions of tympanic membranes normal bilaterally.   Neck: No palpable cervical lymphadenopathy. There was no significant tenderness to palpation of the thyrohyoid space, which was mildly narrow. No obvious thyroid abnormality. Landmarks palpable. Well-healed left neck incision, may have a suture that will eventually come through the skin, at its base.  Resp: Breathing comfortably, no stridor or stertor.  Neuro: Symmetric facial function. Other cranial nerves as documented above.  Psych: Normal affect, pleasant and cooperative.  Voice/speech: Moderate to severe dysphonia characterized by breathiness. Moderate strain.  Extremities: No cyanosis, clubbing, or edema of the upper extremities.    Intake scores  Total Score for Last Patient-Answered VHI Questionnaire  No flowsheet data found.  Total Score for Last Patient-Answered EAT Questionnaire  No flowsheet data found.  Total Score for Last  Patient-Answered CSI Questionnaire  No flowsheet data found.      PROCEDURE:   Flexible fiberoptic laryngoscopy and laryngovideostroboscopy  Indications: This procedure was warranted to evaluate the patient's laryngeal anatomy and function. Risks, benefits, and alternatives were discussed.  Description: After written informed consent was obtained, a time-out was performed to confirm patient identity, procedure, and procedure site. Topical 3% lidocaine with 0.25% phenylephrine was applied to the nasal cavities. I performed the endoscopy and no complications were apparent. Continuous and stroboscopic light were utilized to assess routine phonation and variable frequency phonation.  Performed by: Ophelia Ngo MD MPH  SLP: Yuri Frias MM, MA, CCC-SLP   Findings: Normal nasopharynx. Normal base of tongue, valleculae, and epiglottis. Vocal fold mobility: right: normal; left: essentially absent in paramedian to intermediate position, although there is slight movement of the left arytenoid. Medial edges of the vocal folds: smooth and straight on right, left with mild to moderate bowing. No focal mucosal lesions were observed on the true vocal folds. Glissade produced appropriate elongation. There was mild to moderate supraglottic recruitment with connected speech. Mucosa of false vocal folds, aryepiglottic folds, piriform sinuses, and posterior glottis unremarkable. Airway was slightly narrowed but patent. Response to the therapy probes was fair. No focal lesions on NBI. Mild foamy secretions, no pooling in pyriforms.    The addition of stroboscopy allowed evaluation of the mucosal wave.   Amplitude: right: normal; left: mildly decreased. Symmetry: intermittent symmetry. Closure pattern: spindle-shaped. Closure plane: at glottic level. Phase distribution: open-phase predominant.            IMPRESSION AND PLAN:   Reggie Roper presents with left vocal fold motion impairment in the context of left carotid  endarterectomy in Oct 2021.  We discussed that it can take up to a year to know the eventual neurologic and functional outcome, and that full mobility is not necessary for a functional voice. He was encouraged to know that recovery is still quite possible. We also discussed treatment options, including observation, voice therapy, and a temporary vocal fold injection medialization. Given the glottal gap and insufficiency, our discussion focused primarily on vocal fold injection. We discussed risks, benefits, and alternatives including its temporary effect and the potential need for additional procedures. We also discussed that the injection is not expected to make the voice normal, but it can be expected to improve vocal effort and fatigue, and may improve closure for more effective cough and reduce effort for swallowing. The voice is typically strained or pressed in quality for a few days to weeks following the injection. I typically perform the injection in clinic with the patient awake, although in some cases I do perform it in the OR under general anesthesia. I explained that the injection does not influence neurological recovery, but may mechanically influence vocal fold position.  The patient is interested in possibly having a vocal fold injection followed by speech therapy. I offered to complete it today, but he declined to proceed today and instead preferred to schedule a different visit for an injection, so we will get this set up. We also invited him to reach out with questions as needed, and I have requested assistance getting him set up for [employer].  I appreciate the opportunity to participate in the care of this very pleasant patient.     Today's visit required additional screening time, PPE, and cleaning measures to allow for a safe in-person visit, due to the public health emergency.  I spent a total of 48 minutes on 11/29/2021 in chart review, review of tests, patient visit, documentation, care  coordination, and/or discussion with other providers about the issues documented above, separate from any documented procedure(s).        Ophelia Ngo MD

## 2021-11-29 NOTE — LETTER
"11/29/2021       RE: Reggie Roper  [address]  [facility] [address]     Dear Colleague,    Thank you for referring your patient, Reggie Roper, to the [facility] [place] at [facility]. Please see a copy of my visit note below.    [facility]  Evaluation report    Clinician: Yuir Frias M.M., M.A., CCC/SLP  Seen in conjunction with: Dr. Ngo  Referring physician:  Dr. Mayo  Patient: Reggie Roper  Date of Visit: 11/29/2021    HISTORY  Chief complaint: Reggie Roper is a 66 year old gentleman presenting today for evaluation of voice changes.    Salient history: He has a history significant for mild coronary artery disease, paroxysmal atrial fibrillation, hypertension, recent CVA with left carotid stenosis status post enterectomy on 10/9/2021.  At his follow-up visit on 11/1/2021 with Dr. Mayo overall postsurgical recovery looked good the patient was experiencing ongoing hoarseness.  There was concern for possible injury to the recurrent laryngeal nerve and the patient was referred to our clinic for further evaluation as warranted.    Today he reports that voice change immediately after the procedure, and has been effectively stable since that time.  He is most bothered by difficulty projecting and being understood by others.  Although he does note possible worsening in pre-existing word finding following CVA he does not feel this is meaningfully associated with his communication difficulties.      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     OTHER PERTINENT HISTORY    Otherwise unknown.  Please also refer to Dr. Ngo's dictation.     Past Medical History:   Diagnosis Date     Abnormal stress test      Chest pain     Midsternal Pain     Diabetes mellitus (H)      Hyperlipidemia      Hypertension      Shortness of breath on exertion      Past Surgical History:   Procedure Laterality Date     CATARACT EXTRACTION       ENDARTERECTOMY CAROTID Left 10/9/2021    Procedure: LEFT CAROTID ENDARTERECTOMY;  Surgeon: Jaycob Mayo MD;  Location: [facility] OR     OBJECTIVE  PATIENT REPORTED MEASURES  Patient Supplied Answers To VHI Questionnaire  Voice Handicap Index (VHI-10) 11/29/2021   My voice makes it difficult for people to hear me 4   People have difficulty understanding me in a noisy room 4   My voice difficulties restrict my personal and social life.  4   I feel left out of conversations because of my voice 4   My voice problem causes me to lose income 0   I feel as though I have to strain to produce voice 4   The clarity of my voice is unpredictable 4   My voice problem upsets me 4   My voice makes me feel handicapped 4   People ask, \"What's wrong with your voice?\" 4   VHI-10 36     PERCEPTUAL EVALUATION (CPT 30852)  POSTURE / TENSION:     forward rolled shoulder posture    BREATHING:     significantly shortened breath groups (3 words)    VOICE:    Roughness: Moderate Consistent (consistent with supraglottic vibration)    Breathiness: Moderate to severe Consistent    Strain: Mild to moderate Consistent     Maximum phonation time approximately 2 seconds    Loudness    Conversational speech:  Moderately reduced    Pitch:    Conversational speech:  Mild to moderately lowered    Pitch glide:     Minimal ability to affect pitch glide    Resonance:    Conversational speech:  laryngeal pharyngeal resonance    Singing vs. Speech:  Consistent across contexts    COUGH/THROAT CLEARING:    Occasional    Dry    THERAPY PROBES: " Modest improvement was elicited with use of forward resonant stimuli and use of glottic coup to promote vocal fold closure    LARYNGEAL EXAMINATION  Procedure: Flexible endoscopy with chip-tip technology with stroboscopy, right nostril; topical anesthesia with 3% Lidocaine and 0.25% phenylephrine was applied.   Performed by: Dr. Ophelia Ngo  The laryngeal and pharyngeal structures were evaluated for gross appearance, mobility, function, and focal lesions / abnormalities of the associated mucosa.  Stroboscopy was warranted to evaluate closure, symmetry, and vibratory characteristics of the vocal folds.  All findings were within normal limits with the exception of the following salient features:     Left true vocal fold is effectively immobile in a paramedian position with significant concavity of the vibratory margin    Moments of twitching of the left arytenoid apex are seen seemingly independent of the right    Right true vocal fold demonstrates normal mobility and is seen crossing midline to attempt to achieve glottic closure     significant glottal gap noted across all tasks    There is significant four-way constrictive supraglottic hyperfunction compensation (R>L ventricular recruitment)    Rough quality is associated with intermittent vibration of supraglottic mucosa    Stroboscopy demonstrates    Limited views of the right true vocal given the degree of ventricular recruitment during phonatory tasks    Left true vocal fold shows markedly increased amplitude and mucosal wave consistent with loss of TA bulk    Associated asymmetry      Glottic insufficiency during phonatory tasks    The laryngeal exam was reviewed with Mr. Roper, and I provided pertinent explanations, as well as written and oral information.    ASSESSMENT / PLAN  IMPRESSIONS: Reggie Roper is presenting today with voice changes following left carotid enterectomy in October of this year.  Today's evaluation demonstrates Dysphonia (R49.0) in the  "context of Left true vocal fold paralysis (J 38.01) and associated compensatory imbalance in the intrinsic and extrinsic muscles of the larynx.  Laryngeal evaluation shows mobility of the left true vocal fold with \"twitching\" of the left arytenoid apex.  With phonatory tasks there is a significant glottic gap that was not meaningfully responsive to therapeutic probes.  Given the degree of supraglottic recruitment some vibration of supraglottic mucosa is noted corresponding to roughness quality appreciated in the patient's voice.      RECOMMENDATIONS:     Various avenues for intervention were discussed including watchful waiting, speech therapy alone, and injection augmentation with adjuvant speech therapy.  The patient would like to consider but is leaning strongly towards the latter.    A course of speech therapy is recommended to optimize vocal technique, improve voice quality and optimize surgical results.    Should patient choose to pursue therapy the following goals prognosis and dosage would apply    He demonstrates a Good prognosis for improvement given adherence to therapeutic recommendations.     Positive indicators: diagnosis is known to respond to treatment    Negative indicators: none    DURATION / FREQUENCY: 3 postprocedural biweekly and 2 monthly postprocedural one-hour sessions    Research: Was not discussed.    GOALS:  Patient goal:   1. To improve and maintain a healthy voice quality  2. To understand the problem and fix it as much as possible    Short-term goal(s): Within the first 4 sessions, Mr. Roper:  1. will be able to independently list key factors in maintenance of good vocal hygiene with 80% accuracy, and report on their use outside the therapy room.  2. will utilize silent inhalation with good low-respiratory engagement 75% of the time during therapy tasks with minimal clinician support  3. will demonstrate semi-occluded vocal tract (SOVT) exercises with at least 80% accuracy with no " clinician support  4. will accurately identify target vs. habitual voice quality during therapy tasks in 4 out of 5 trials with no clinician support  5. will demonstrate the ability to alternate between target and habitual voice quality given clinician cue 75% of the time during therapy tasks    Long-term goal(s): In 6 months, Mr. Roper will:  1. Report a week of typical activities, in which Dysphonia does not exceed a level of 3 out of 10, 80% of the time    This treatment plan was developed with the patient who agreed with the recommendations.    TOTAL SERVICE TIME: 35 minutes  EVALUATION OF VOICE AND RESONANCE (78195)  NO CHARGE FACILITY FEE (23098)    Yuri Frias M.M., M.A., CCC-SLP  Speech-Language Pathologist  Certificate of Vocology  [phone]    *this report was created in part through the use of computerized dictation software, and though reviewed following completion, some typographic errors may persist.  If there is confusion regarding any of this notes contents, please contact me for clarification.*        Again, thank you for allowing me to participate in the care of your patient.      Sincerely,    Anurag Frias, SLP

## 2021-11-29 NOTE — PROGRESS NOTES
Dear Dr. Mayo:    I had the pleasure of meeting Reggie Roper in consultation at the [facility] of the [facility] at your request, for evaluation of dysphonia. The note from our visit follows. Speech recognition software may have been used in the documentation below; input is reviewed before signature to the best of my ability. I appreciate the opportunity to participate in the care of this pleasant patient.    Please feel free to contact me with any questions.    Sincerely yours,    Ophelia Ngo M.D., M.P.H.  , Laryngology  Director, [facility]  Otolaryngology- Head & Neck Surgery  [phone]          =====    HISTORY OF PRESENT ILLNESS:   Reggie Roper is a pleasant 66 year old male with a history of carotid stenosis and left CEA who presents with a nearly two month history of dysphonia.     Voice  He reports that his voice has been different from usual voice since left CEA 10/9/21 for symptomatic left carotid stenosis.  Voice is lower and less strong. Sometimes has word finding challenges, but mostly it is a projection issue. Voice has been entirely stable, not improving, not worsening. Works in warehouse operations, night shift. Not much vocal demand.    Swallowing  No concerns. No trouble with liquids. No recent pneumonias.    Cough/Throat-clearing  Has an inconsistent cough, not associated with oral intake.    Breathing  No concerns.     Throat discomfort  No concerns.     Reflux-type symptoms  He experiences heartburn/indigestion: never. He is taking reflux medications.      Prior Epic/outside records were reviewed for this visit, including:  Jaycob Mayo MD 11/1/21      MEDICATIONS:     Current Outpatient Medications   Medication Sig Dispense Refill     acetaminophen (TYLENOL) 325 MG tablet Take 2 tablets (650 mg) by mouth every 4 hours as needed for other (For optimal non-opioid multimodal pain  management to improve pain control.)       atorvastatin (LIPITOR) 80 MG tablet 1 tablet (80 mg) by Oral or NG Tube route daily 30 tablet 0     BYSTOLIC 10 mg tablet [BYSTOLIC 10 MG TABLET] TAKE 1 TABLET BY MOUTH DAILY FOR BLOOD PRESSURE 90 tablet 0     clopidogrel (PLAVIX) 75 MG tablet Take 1 tablet (75 mg) by mouth daily 30 tablet 1     dapagliflozin (FARXIGA) 10 MG TABS tablet Take 10 mg by mouth daily       metFORMIN (GLUCOPHAGE) 500 MG tablet Take 1,500 mg by mouth daily       omeprazole (PRILOSEC) 20 MG capsule [OMEPRAZOLE (PRILOSEC) 20 MG CAPSULE] Take 20 mg by mouth daily.       rivaroxaban ANTICOAGULANT (XARELTO) 20 MG TABS tablet Take 1 tablet (20 mg) by mouth daily (with dinner) 90 tablet 1     sitagliptin (JANUVIA) 100 MG tablet Take 100 mg by mouth daily       TEKTURNA 150 mg tablet [TEKTURNA 150 MG TABLET] TAKE 1 TABLET BY MOUTH ONCE DAILY FOR BLOOD PRESSURE 90 tablet 3       ALLERGIES:    Allergies   Allergen Reactions     Amlodipine      Other reaction(s): Edema, leg swelling at high dose  Leg swelling at high doses       Hydrochlorothiazide      Other reaction(s): hypokalemia, Hypokalemia     Lisinopril Cough     Other reaction(s): cough     Losartan      Other reaction(s): face flushing, Flushing       PAST MEDICAL HISTORY:   Past Medical History:   Diagnosis Date     Abnormal stress test      Chest pain     Midsternal Pain     Diabetes mellitus (H)      Hyperlipidemia      Hypertension      Shortness of breath on exertion         PAST SURGICAL HISTORY:   Past Surgical History:   Procedure Laterality Date     CATARACT EXTRACTION       ENDARTERECTOMY CAROTID Left 10/9/2021    Procedure: LEFT CAROTID ENDARTERECTOMY;  Surgeon: Jaycob Mayo MD;  Location: [facility]       HABITS/SOCIAL HISTORY:    Social History     Tobacco Use     Smoking status: Former Smoker     Types: Cigars, Cigars     Smokeless tobacco: Never Used   Substance Use Topics     Alcohol use: Yes     Comment: Alcoholic Drinks/day:  occasional       FAMILY HISTORY:    Family History   Problem Relation Age of Onset     Heart Disease Father      CABG Father 78.00       REVIEW OF SYSTEMS:  The patient completed a comprehensive 11 point review of systems (below), which was reviewed. Positives are as noted below; pertinent findings are as noted in the HPI.     Patient Supplied Answers to Review of Systems   Noncontributory      PHYSICAL EXAMINATION:  General: The patient was alert and conversant, and in no acute distress.    Eyes: PERRL, conjunctiva and lids normal, sclera nonicteric.  Nose: Anterior rhinoscopy: no gross abnormalities. no  bleeding; no  mucopurulence; septum grossly normal, mild mucoid drainage and/or crusting.  Oral cavity/oropharynx: No masses or lesions. Dentition in poor condition. Floor of mouth and oral tongue soft to palpation. Tongue mobility and palate elevation intact and symmetric.  Ears: Normal auricles, external auditory canals bilaterally. Visualized portions of tympanic membranes normal bilaterally.   Neck: No palpable cervical lymphadenopathy. There was no significant tenderness to palpation of the thyrohyoid space, which was mildly narrow. No obvious thyroid abnormality. Landmarks palpable. Well-healed left neck incision, may have a suture that will eventually come through the skin, at its base.  Resp: Breathing comfortably, no stridor or stertor.  Neuro: Symmetric facial function. Other cranial nerves as documented above.  Psych: Normal affect, pleasant and cooperative.  Voice/speech: Moderate to severe dysphonia characterized by breathiness. Moderate strain.  Extremities: No cyanosis, clubbing, or edema of the upper extremities.    Intake scores  Total Score for Last Patient-Answered VHI Questionnaire  No flowsheet data found.  Total Score for Last Patient-Answered EAT Questionnaire  No flowsheet data found.  Total Score for Last Patient-Answered CSI Questionnaire  No flowsheet data found.      PROCEDURE:    Flexible fiberoptic laryngoscopy and laryngovideostroboscopy  Indications: This procedure was warranted to evaluate the patient's laryngeal anatomy and function. Risks, benefits, and alternatives were discussed.  Description: After written informed consent was obtained, a time-out was performed to confirm patient identity, procedure, and procedure site. Topical 3% lidocaine with 0.25% phenylephrine was applied to the nasal cavities. I performed the endoscopy and no complications were apparent. Continuous and stroboscopic light were utilized to assess routine phonation and variable frequency phonation.  Performed by: Ophelia Ngo MD MPH  SLP: Yuri Frias MM, MA, CCC-SLP   Findings: Normal nasopharynx. Normal base of tongue, valleculae, and epiglottis. Vocal fold mobility: right: normal; left: essentially absent in paramedian to intermediate position, although there is slight movement of the left arytenoid. Medial edges of the vocal folds: smooth and straight on right, left with mild to moderate bowing. No focal mucosal lesions were observed on the true vocal folds. Glissade produced appropriate elongation. There was mild to moderate supraglottic recruitment with connected speech. Mucosa of false vocal folds, aryepiglottic folds, piriform sinuses, and posterior glottis unremarkable. Airway was slightly narrowed but patent. Response to the therapy probes was fair. No focal lesions on NBI. Mild foamy secretions, no pooling in pyriforms.    The addition of stroboscopy allowed evaluation of the mucosal wave.   Amplitude: right: normal; left: mildly decreased. Symmetry: intermittent symmetry. Closure pattern: spindle-shaped. Closure plane: at glottic level. Phase distribution: open-phase predominant.            IMPRESSION AND PLAN:   Reggie Roper presents with left vocal fold motion impairment in the context of left carotid endarterectomy in Oct 2021.  We discussed that it can take up to a year to know the  eventual neurologic and functional outcome, and that full mobility is not necessary for a functional voice. He was encouraged to know that recovery is still quite possible. We also discussed treatment options, including observation, voice therapy, and a temporary vocal fold injection medialization. Given the glottal gap and insufficiency, our discussion focused primarily on vocal fold injection. We discussed risks, benefits, and alternatives including its temporary effect and the potential need for additional procedures. We also discussed that the injection is not expected to make the voice normal, but it can be expected to improve vocal effort and fatigue, and may improve closure for more effective cough and reduce effort for swallowing. The voice is typically strained or pressed in quality for a few days to weeks following the injection. I typically perform the injection in clinic with the patient awake, although in some cases I do perform it in the OR under general anesthesia. I explained that the injection does not influence neurological recovery, but may mechanically influence vocal fold position.  The patient is interested in possibly having a vocal fold injection followed by speech therapy. I offered to complete it today, but he declined to proceed today and instead preferred to schedule a different visit for an injection, so we will get this set up. We also invited him to reach out with questions as needed, and I have requested assistance getting [place].  I appreciate the opportunity to participate in the care of this very pleasant patient.     Today's visit required additional screening time, PPE, and cleaning measures to allow for a safe in-person visit, due to the public health emergency.  I spent a total of 48 minutes on 11/29/2021 in chart review, review of tests, patient visit, documentation, care coordination, and/or discussion with other providers about the issues documented above,  separate from any documented procedure(s).

## 2021-11-29 NOTE — PROGRESS NOTES
"[facility]  Evaluation report    Clinician: Yuri Frias M.M., M.A., CCC/SLP  Seen in conjunction with: Dr. Ngo  Referring physician:  Dr. Mayo  Patient: Reggie Roper  Date of Visit: 11/29/2021    HISTORY  Chief complaint: Reggie Roper is a 66 year old gentleman presenting today for evaluation of voice changes.    Salient history: He has a history significant for mild coronary artery disease, paroxysmal atrial fibrillation, hypertension, recent CVA with left carotid stenosis status post enterectomy on 10/9/2021.  At his follow-up visit on 11/1/2021 with Dr. Mayo overall postsurgical recovery looked good the patient was experiencing ongoing hoarseness.  There was concern for possible injury to the recurrent laryngeal nerve and the patient was referred to our clinic for further evaluation as warranted.    Today he reports that voice change immediately after the procedure, and has been effectively stable since that time.  He is most bothered by difficulty projecting and being understood by others.  Although he does note possible worsening in pre-existing word finding following CVA he does not feel this is meaningfully associated with his communication difficulties.      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     OTHER PERTINENT HISTORY    Otherwise unknown.  Please also refer to Dr. Ngo's dictation.     Past Medical History:   Diagnosis Date     Abnormal stress test      Chest pain     Midsternal Pain     Diabetes mellitus (H)      Hyperlipidemia      Hypertension      Shortness " "of breath on exertion      Past Surgical History:   Procedure Laterality Date     CATARACT EXTRACTION       ENDARTERECTOMY CAROTID Left 10/9/2021    Procedure: LEFT CAROTID ENDARTERECTOMY;  Surgeon: Jaycob Mayo MD;  Location:  OR     OBJECTIVE  PATIENT REPORTED MEASURES  Patient Supplied Answers To VHI Questionnaire  Voice Handicap Index (VHI-10) 11/29/2021   My voice makes it difficult for people to hear me 4   People have difficulty understanding me in a noisy room 4   My voice difficulties restrict my personal and social life.  4   I feel left out of conversations because of my voice 4   My voice problem causes me to lose income 0   I feel as though I have to strain to produce voice 4   The clarity of my voice is unpredictable 4   My voice problem upsets me 4   My voice makes me feel handicapped 4   People ask, \"What's wrong with your voice?\" 4   VHI-10 36     PERCEPTUAL EVALUATION (CPT 29944)  POSTURE / TENSION:     forward rolled shoulder posture    BREATHING:     significantly shortened breath groups (3 words)    VOICE:    Roughness: Moderate Consistent (consistent with supraglottic vibration)    Breathiness: Moderate to severe Consistent    Strain: Mild to moderate Consistent     Maximum phonation time approximately 2 seconds    Loudness    Conversational speech:  Moderately reduced    Pitch:    Conversational speech:  Mild to moderately lowered    Pitch glide:     Minimal ability to affect pitch glide    Resonance:    Conversational speech:  laryngeal pharyngeal resonance    Singing vs. Speech:  Consistent across contexts    COUGH/THROAT CLEARING:    Occasional    Dry    THERAPY PROBES: Modest improvement was elicited with use of forward resonant stimuli and use of glottic coup to promote vocal fold closure    LARYNGEAL EXAMINATION  Procedure: Flexible endoscopy with chip-tip technology with stroboscopy, right nostril; topical anesthesia with 3% Lidocaine and 0.25% phenylephrine was applied. " "  Performed by: Dr. Ophelia Ngo  The laryngeal and pharyngeal structures were evaluated for gross appearance, mobility, function, and focal lesions / abnormalities of the associated mucosa.  Stroboscopy was warranted to evaluate closure, symmetry, and vibratory characteristics of the vocal folds.  All findings were within normal limits with the exception of the following salient features:     Left true vocal fold is effectively immobile in a paramedian position with significant concavity of the vibratory margin    Moments of twitching of the left arytenoid apex are seen seemingly independent of the right    Right true vocal fold demonstrates normal mobility and is seen crossing midline to attempt to achieve glottic closure     significant glottal gap noted across all tasks    There is significant four-way constrictive supraglottic hyperfunction compensation (R>L ventricular recruitment)    Rough quality is associated with intermittent vibration of supraglottic mucosa    Stroboscopy demonstrates    Limited views of the right true vocal given the degree of ventricular recruitment during phonatory tasks    Left true vocal fold shows markedly increased amplitude and mucosal wave consistent with loss of TA bulk    Associated asymmetry      Glottic insufficiency during phonatory tasks    The laryngeal exam was reviewed with Mr. Roper, and I provided pertinent explanations, as well as written and oral information.    ASSESSMENT / PLAN  IMPRESSIONS: Reggie Roper is presenting today with voice changes following left carotid enterectomy in October of this year.  Today's evaluation demonstrates Dysphonia (R49.0) in the context of Left true vocal fold paralysis (J 38.01) and associated compensatory imbalance in the intrinsic and extrinsic muscles of the larynx.  Laryngeal evaluation shows mobility of the left true vocal fold with \"twitching\" of the left arytenoid apex.  With phonatory tasks there is a significant glottic gap " that was not meaningfully responsive to therapeutic probes.  Given the degree of supraglottic recruitment some vibration of supraglottic mucosa is noted corresponding to roughness quality appreciated in the patient's voice.      RECOMMENDATIONS:     Various avenues for intervention were discussed including watchful waiting, speech therapy alone, and injection augmentation with adjuvant speech therapy.  The patient would like to consider but is leaning strongly towards the latter.    A course of speech therapy is recommended to optimize vocal technique, improve voice quality and optimize surgical results.    Should patient choose to pursue therapy the following goals prognosis and dosage would apply    He demonstrates a Good prognosis for improvement given adherence to therapeutic recommendations.     Positive indicators: diagnosis is known to respond to treatment    Negative indicators: none    DURATION / FREQUENCY: 3 postprocedural biweekly and 2 monthly postprocedural one-hour sessions    Research: Was not discussed.    GOALS:  Patient goal:   1. To improve and maintain a healthy voice quality  2. To understand the problem and fix it as much as possible    Short-term goal(s): Within the first 4 sessions, Mr. Roper:  1. will be able to independently list key factors in maintenance of good vocal hygiene with 80% accuracy, and report on their use outside the therapy room.  2. will utilize silent inhalation with good low-respiratory engagement 75% of the time during therapy tasks with minimal clinician support  3. will demonstrate semi-occluded vocal tract (SOVT) exercises with at least 80% accuracy with no clinician support  4. will accurately identify target vs. habitual voice quality during therapy tasks in 4 out of 5 trials with no clinician support  5. will demonstrate the ability to alternate between target and habitual voice quality given clinician cue 75% of the time during therapy tasks    Long-term goal(s):  In 6 months, Mr. Roper will:  1. Report a week of typical activities, in which Dysphonia does not exceed a level of 3 out of 10, 80% of the time    This treatment plan was developed with the patient who agreed with the recommendations.    TOTAL SERVICE TIME: 35 minutes  EVALUATION OF VOICE AND RESONANCE (96451)  NO CHARGE FACILITY FEE (67373)    Yuri Frias M.M., M.A., CCC-SLP  Speech-Language Pathologist  Certificate of Vocology  [phone]    *this report was created in part through the use of computerized dictation software, and though reviewed following completion, some typographic errors may persist.  If there is confusion regarding any of this notes contents, please contact me for clarification.*

## 2021-12-02 ENCOUNTER — TELEPHONE (OUTPATIENT)
Dept: OTOLARYNGOLOGY | Facility: CLINIC | Age: 66
End: 2021-12-02
Payer: COMMERCIAL

## 2021-12-02 NOTE — TELEPHONE ENCOUNTER
Left message regarding scheduling surgery/procedure with Dr. Ngo. Writer left call back number on the patients voicemail.    Hedy Salazar on 12/2/2021 at 11:21 AM   P: [phone]

## 2021-12-03 DIAGNOSIS — Z11.59 ENCOUNTER FOR SCREENING FOR OTHER VIRAL DISEASES: Primary | ICD-10-CM

## 2021-12-03 PROBLEM — J38.01 UNILATERAL PARTIAL VOCAL CORD PARALYSIS: Status: ACTIVE | Noted: 2021-12-03

## 2021-12-03 PROBLEM — R49.0 DYSPHONIA: Status: ACTIVE | Noted: 2021-12-03

## 2021-12-03 NOTE — TELEPHONE ENCOUNTER
Patient called back regarding scheduling procedure with Dr. Ngo.     Patient notified writer that he had been scheduled by [facility] on 1/7/2022 for his procedure at 12:00 p.m.     After discussing with the clinic, informed patient that Dr. Ngo would like to procedure up in our procedure center and she is available on Thursday's.     Patient was agreeable to date of 12/9/2021. Discussed arrival at 1:00 p.m. and procedure start time at 2:00 p.m. procedure taking about 1 hr and will leave shortly after.   He does not need a /local anesthesia only and no restrictions. Patient indicates he works nights. No pre-op needed.     He is scheduled for the [facility] for his covid-19 test on Monday, 12/06/2021. Patient has no further questions at this time.     SHERIN Marsh will call with procedure teachings.     Hedy Salazar on 12/3/2021 at 9:41 AM

## 2021-12-06 ENCOUNTER — LAB (OUTPATIENT)
Dept: URGENT CARE | Facility: URGENT CARE | Age: 66
End: 2021-12-06
Payer: COMMERCIAL

## 2021-12-06 ENCOUNTER — PATIENT OUTREACH (OUTPATIENT)
Dept: OTOLARYNGOLOGY | Facility: CLINIC | Age: 66
End: 2021-12-06

## 2021-12-06 DIAGNOSIS — Z11.59 ENCOUNTER FOR SCREENING FOR OTHER VIRAL DISEASES: ICD-10-CM

## 2021-12-06 PROCEDURE — U0003 INFECTIOUS AGENT DETECTION BY NUCLEIC ACID (DNA OR RNA); SEVERE ACUTE RESPIRATORY SYNDROME CORONAVIRUS 2 (SARS-COV-2) (CORONAVIRUS DISEASE [COVID-19]), AMPLIFIED PROBE TECHNIQUE, MAKING USE OF HIGH THROUGHPUT TECHNOLOGIES AS DESCRIBED BY CMS-2020-01-R: HCPCS

## 2021-12-06 PROCEDURE — U0005 INFEC AGEN DETEC AMPLI PROBE: HCPCS

## 2021-12-06 NOTE — PROGRESS NOTES
Left voicemail for patient to discuss upcoming procedure with Dr. Ngo and answer any questions.    Salma Bailey RN on 12/6/2021 at 5:12 PM

## 2021-12-07 LAB — SARS-COV-2 RNA RESP QL NAA+PROBE: NEGATIVE

## 2021-12-08 NOTE — PROGRESS NOTES
Spoke to patient regarding procedure tomorrow. Discussed pre operative teaching and what to expect from the procedure and post op. Patient has no further questions at this time.    Salma Bailey RN on 12/8/2021 at 2:50 PM

## 2021-12-09 ENCOUNTER — HOSPITAL ENCOUNTER (OUTPATIENT)
Facility: AMBULATORY SURGERY CENTER | Age: 66
Discharge: HOME | End: 2021-12-09
Attending: OTOLARYNGOLOGY | Admitting: OTOLARYNGOLOGY
Payer: COMMERCIAL

## 2021-12-09 VITALS
TEMPERATURE: 98.5 F | SYSTOLIC BLOOD PRESSURE: 163 MMHG | HEART RATE: 50 BPM | OXYGEN SATURATION: 96 % | WEIGHT: 223 LBS | BODY MASS INDEX: 31.92 KG/M2 | DIASTOLIC BLOOD PRESSURE: 87 MMHG | RESPIRATION RATE: 18 BRPM | HEIGHT: 70 IN

## 2021-12-09 DIAGNOSIS — J38.01 UNILATERAL PARTIAL VOCAL CORD PARALYSIS: ICD-10-CM

## 2021-12-09 DIAGNOSIS — R49.0 DYSPHONIA: ICD-10-CM

## 2021-12-09 PROCEDURE — 31574 LARGSC W/NJX AUGMENTATION: CPT | Mod: LT | Performed by: OTOLARYNGOLOGY

## 2021-12-09 PROCEDURE — 31574 LARGSC W/NJX AUGMENTATION: CPT | Mod: LT

## 2021-12-09 DEVICE — IMP VOCAL CORD PROLARYN GEL INJ 1ML 8602MOK5: Type: IMPLANTABLE DEVICE | Site: THROAT | Status: FUNCTIONAL

## 2021-12-09 RX ORDER — LIDOCAINE HYDROCHLORIDE AND EPINEPHRINE 10; 10 MG/ML; UG/ML
INJECTION, SOLUTION INFILTRATION; PERINEURAL PRN
Status: DISCONTINUED | OUTPATIENT
Start: 2021-12-09 | End: 2021-12-09 | Stop reason: HOSPADM

## 2021-12-09 ASSESSMENT — MIFFLIN-ST. JEOR: SCORE: 1792.77

## 2021-12-09 NOTE — OP NOTE
PROCEDURE: Flexible fiberoptic transnasal laryngoscopy with percutaneous transcervical Prolaryn Gel injection to left true vocal fold.   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   POSTOPERATIVE DIAGNOSIS: Same.   SURGEON: Ophelia Ngo MD.   INDICATIONS: The patient is 66 year old male with a history of carotid endarterectomy who presented with a unilateral vocal fold motion impairment. We discussed options for intervention, and the patient opted for a vocal fold injection after hearing about the risks, benefits, and alternatives. The injection was performed under fiberoptic visualization, which was necessary to confirm appropriate placement of the injectate within the vocal fold with preservation of an adequate airway. The injection was performed percutaneously to minimize infection risk.  FINDINGS: Unilateral left true vocal fold motion impairment. Normal right vocal fold mobility. A total of 0.8 cc of Prolaryn Gel was injected into the left true vocal fold(s) with very good medialization.  DESCRIPTION OF PROCEDURE: After written informed consent was obtained, a time-out was performed to confirm patient identity, procedure, and procedure site. Two-three atomizer puffs of topical 3% lidocaine with 0.25% phenylephrine was applied to the patient's nasal cavities bilaterally. Then the skin was cleaned with an alcohol pad and 2 cc of 1% lidocaine with 1:100,000 epinephrine was used to inject the precricoid and pre-thyroid cartilage regions for local anesthesia and vasoconstriction. I then inserted and advanced the transnasal flexible laryngoscope to allow visualization of the larynx.  My assistant stabilized the laryngoscope to allow persistent visualization of the larynx. I entered the cricothyroid space approximately 1 cm to the  left of midline and traveled under the thyroid cartilage to position the needle to allow injection of the material for  medialization. Under visualization with the flexible fiberoptic laryngoscope, the needle was repositioned as needed until appropriate location was visualized. The location of the needle was visualized by transmitted motion through the body of the vocal fold. The injection allowed very good medialization of the vocal fold, with slight intentional overcorrection.  At the conclusion of the injection, the patient was noted to have a mild to moderately tight voice quality, as desired. The airway remained patent.   COMPLICATIONS: None apparent.   DISPOSITION: Stable to home.   PLAN: Return to clinic in ~1 month. Voice therapy as needed.

## 2021-12-09 NOTE — DISCHARGE INSTRUCTIONS
[facility]  Home Care Following Your Procedure  Call a doctor if you have signs of infection (fever, growing tenderness at the surgery site, a large amount of drainage or bleeding, severe pain, foul-smelling drainage, redness, swelling).         Tylenol/Acetaminophen Consumption  To help encourage the safe use of acetaminophen, the makers of TYLENOL  have lowered the maximum daily dose for single-ingredient Extra Strength TYLENOL  (acetaminophen) products sold in the [place] from 8 pills per day (4,000 mg) to 6 pills per day (3,000 mg). The dosing interval has also changed from 2 pills every 4-6 hours to 2 pills every 6 hours.    If you feel your pain relief is insufficient, you may take Tylenol/Acetaminophen in addition to your narcotic pain medication.     Be careful not to exceed 3,000 mg of Tylenol/Acetaminophen in a 24 hour period from all sources.    If you are taking extra strength Tylenol/acetaminophen (500 mg), the maximum dose is 6 tablets in 24 hours.    If you are taking regular strength acetaminophen (325 mg), the maximum dose is 9 tablets in 24 hours.  Your doctor is:   Dr. Ophelia Ngo, ENT Otolaryngology: [phone]                                    After Hours and Weekends dial: [phone] and ask for the resident on call for:  ENT Otolaryngology  For emergency care, call the:  [place]:  [phone] (TTY for hearing impaired: [phone])

## 2021-12-09 NOTE — H&P
Abbreviated H&P for ASC Procedure under Local Anesthesia    1. Chief complaint and/or reason for procedure  Dysphonia  Unilateral vocal fold motion impairment    2. Medical history describing significant medical conditions and previous surgeries  PAST MEDICAL HISTORY:   Past Medical History:   Diagnosis Date     Abnormal stress test      Chest pain     Midsternal Pain     Diabetes mellitus (H)      Hyperlipidemia      Hypertension      Shortness of breath on exertion         PAST SURGICAL HISTORY:   Past Surgical History:   Procedure Laterality Date     CATARACT EXTRACTION       ENDARTERECTOMY CAROTID Left 10/9/2021    Procedure: LEFT CAROTID ENDARTERECTOMY;  Surgeon: Jaycob Mayo MD;  Location:  OR       Health history changes since last seen? NA    3. Current medications and allergies  MEDICATIONS:     Current Outpatient Medications   Medication Sig Dispense Refill     acetaminophen (TYLENOL) 325 MG tablet Take 2 tablets (650 mg) by mouth every 4 hours as needed for other (For optimal non-opioid multimodal pain management to improve pain control.)       atorvastatin (LIPITOR) 80 MG tablet 1 tablet (80 mg) by Oral or NG Tube route daily 30 tablet 0     BYSTOLIC 10 mg tablet [BYSTOLIC 10 MG TABLET] TAKE 1 TABLET BY MOUTH DAILY FOR BLOOD PRESSURE 90 tablet 0     clopidogrel (PLAVIX) 75 MG tablet Take 1 tablet (75 mg) by mouth daily 30 tablet 1     dapagliflozin (FARXIGA) 10 MG TABS tablet Take 10 mg by mouth daily       metFORMIN (GLUCOPHAGE) 500 MG tablet Take 1,500 mg by mouth daily       omeprazole (PRILOSEC) 20 MG capsule [OMEPRAZOLE (PRILOSEC) 20 MG CAPSULE] Take 20 mg by mouth daily.       rivaroxaban ANTICOAGULANT (XARELTO) 20 MG TABS tablet Take 1 tablet (20 mg) by mouth daily (with dinner) 90 tablet 1     sitagliptin (JANUVIA) 100 MG tablet Take 100 mg by mouth daily       TEKTURNA 150 mg tablet [TEKTURNA 150 MG TABLET] TAKE 1 TABLET BY MOUTH ONCE DAILY FOR BLOOD PRESSURE 90 tablet 3        ALLERGIES:    Allergies   Allergen Reactions     Amlodipine      Other reaction(s): Edema, leg swelling at high dose  Leg swelling at high doses       Hydrochlorothiazide      Other reaction(s): hypokalemia, Hypokalemia     Lisinopril Cough     Other reaction(s): cough     Losartan      Other reaction(s): face flushing, Flushing       4. Review of systems  Noncontributory      5. Physical examination  Vital signs stable.  Awake, alert, conversant.  Breathing comfortably, no stridor/stertor.  Voice with moderate dysphonia characterized by breathiness and roughness.    6. ASA classification  ASA II    Plan to proceed as scheduled.

## 2021-12-13 ENCOUNTER — PATIENT OUTREACH (OUTPATIENT)
Dept: OTOLARYNGOLOGY | Facility: CLINIC | Age: 66
End: 2021-12-13
Payer: COMMERCIAL

## 2021-12-13 ENCOUNTER — OFFICE VISIT (OUTPATIENT)
Dept: OTHER | Facility: CLINIC | Age: 66
End: 2021-12-13
Attending: SURGERY
Payer: COMMERCIAL

## 2021-12-13 VITALS — SYSTOLIC BLOOD PRESSURE: 118 MMHG | HEART RATE: 71 BPM | DIASTOLIC BLOOD PRESSURE: 77 MMHG

## 2021-12-13 DIAGNOSIS — I65.22 CAROTID ARTERY STENOSIS, SYMPTOMATIC, LEFT: ICD-10-CM

## 2021-12-13 PROCEDURE — G0463 HOSPITAL OUTPT CLINIC VISIT: HCPCS

## 2021-12-13 PROCEDURE — 99024 POSTOP FOLLOW-UP VISIT: CPT | Performed by: SURGERY

## 2021-12-13 NOTE — PATIENT INSTRUCTIONS
Follow up with Dr. Mayo in 3 months with ultrasound of Left carotid and in person office visit.     Stop taking Plavix and start Aspirin 81mg once daily.     Wait at least 3 months from date of carotid surgery before having dental procedure.     Call if you have questions or concerns.     KANDY MartinezN, RN  [facility]  Office:  [phone] Fax: [phone]

## 2021-12-13 NOTE — PROGRESS NOTES
[facility]        Patient is here for a  follow up.     Pt is currently taking Plavix and Xarelto.    /77 (BP Location: Right arm, Patient Position: Chair, Cuff Size: Adult Regular)   Pulse 71     The provider has been notified that the patient has no concerns.     Questions patient would like addressed today are: N/A.    Refills are needed: N/A    Has homecare services and agency name:  Parul Sanabria MA

## 2021-12-13 NOTE — PROGRESS NOTES
Reached out to patient to check in following vocal fold injection with Dr. Ngo. Encouraged to call back with any questions or concerns.    Salma Bailey RN on 12/13/2021 at 3:01 PM

## 2021-12-14 NOTE — PROGRESS NOTES
Mr. Roper was treated with left carotid endarterectomy for severe symptomatic stenosis on 9 October 2021.  He reports that he feels a sharpness in the lower part of his incision.    This is a Monocryl suture knot that is trying to poke out of the skin.    I gave local anesthetic and opened it up with a 15 blade and excised Monocryl suture.    He will follow up according to his original plan of getting a left carotid duplex sonography in 3 months.    He can stop his Plavix.

## 2021-12-27 DIAGNOSIS — Z79.2 PROPHYLACTIC ANTIBIOTIC: Primary | ICD-10-CM

## 2021-12-27 NOTE — TELEPHONE ENCOUNTER
[facility]    Who is the name of the provider?:  Gael      What is the location you see this provider at?: [place]    Reason for call:  Needs to know when he can have multiple teeth extracted after his surgery 10/09/21.      Can we leave a detailed message on this number?  YES

## 2021-12-28 NOTE — TELEPHONE ENCOUNTER
Pt s/p left carotid endarterectomy on 10/9/21.     Pt should wait at least 1 month if elective case.   Will need prophylactic antibiotics for life.   Also will need to verify if anticoagulant hold (number of days dentist is requesting hold) and then pt to call his cardiologist to verify approval of hold.      Called pt back, left vm explaining this and requested a call back.     CULLEN Martinez, RN  [facility]  Office:  [phone] Fax: [phone]

## 2021-12-30 ENCOUNTER — LAB REQUISITION (OUTPATIENT)
Dept: LAB | Facility: CLINIC | Age: 66
End: 2021-12-30
Payer: COMMERCIAL

## 2021-12-30 ENCOUNTER — TRANSFERRED RECORDS (OUTPATIENT)
Dept: HEALTH INFORMATION MANAGEMENT | Facility: CLINIC | Age: 66
End: 2021-12-30

## 2021-12-30 DIAGNOSIS — E11.9 TYPE 2 DIABETES MELLITUS WITHOUT COMPLICATIONS (H): ICD-10-CM

## 2021-12-30 LAB
CHOLEST SERPL-MCNC: 119 MG/DL
HBA1C MFR BLD: 6.9 % (ref 4.2–6.1)
HDLC SERPL-MCNC: 35 MG/DL
LDLC SERPL CALC-MCNC: 59 MG/DL
TRIGL SERPL-MCNC: 123 MG/DL

## 2021-12-30 PROCEDURE — 80061 LIPID PANEL: CPT | Mod: ORL | Performed by: FAMILY MEDICINE

## 2022-01-03 ENCOUNTER — TELEPHONE (OUTPATIENT)
Dept: OTHER | Facility: CLINIC | Age: 67
End: 2022-01-03
Payer: COMMERCIAL

## 2022-01-03 RX ORDER — AMOXICILLIN 500 MG/1
TABLET, FILM COATED ORAL
Qty: 4 TABLET | Refills: 5 | Status: SHIPPED | OUTPATIENT
Start: 2022-01-03 | End: 2024-09-10

## 2022-01-03 NOTE — TELEPHONE ENCOUNTER
Called pt back, explained prophylactic antibiotics. Pt will verify with dentist re number of days to hold Xarelto, then pt will call cardiologist to verify cardiology approval of hold. Pt notes understanding.     CULLEN Martinez, RN  [facility]  Office:  [phone] Fax: [phone]

## 2022-01-03 NOTE — TELEPHONE ENCOUNTER
See previous tele encounter along with updates.    CULLEN Martinez, RN  [facility]  Office:  [phone] Fax: [phone]

## 2022-01-07 ENCOUNTER — OFFICE VISIT (OUTPATIENT)
Dept: OTOLARYNGOLOGY | Facility: CLINIC | Age: 67
End: 2022-01-07
Payer: COMMERCIAL

## 2022-01-07 VITALS
OXYGEN SATURATION: 95 % | DIASTOLIC BLOOD PRESSURE: 76 MMHG | HEIGHT: 70 IN | WEIGHT: 218 LBS | HEART RATE: 51 BPM | SYSTOLIC BLOOD PRESSURE: 142 MMHG | BODY MASS INDEX: 31.21 KG/M2

## 2022-01-07 DIAGNOSIS — R49.0 DYSPHONIA: Primary | ICD-10-CM

## 2022-01-07 PROCEDURE — 99212 OFFICE O/P EST SF 10 MIN: CPT | Mod: 25 | Performed by: OTOLARYNGOLOGY

## 2022-01-07 PROCEDURE — 31579 LARYNGOSCOPY TELESCOPIC: CPT | Performed by: OTOLARYNGOLOGY

## 2022-01-07 ASSESSMENT — MIFFLIN-ST. JEOR: SCORE: 1770.09

## 2022-01-07 ASSESSMENT — PAIN SCALES - GENERAL: PAINLEVEL: NO PAIN (0)

## 2022-01-07 NOTE — LETTER
1/7/2022       RE: Reggie Roper  [address]  [place] [address]     Dear Colleague,    Thank you for referring your patient, Reggie Roper, to the [facility] [place] at [facility]. Please see a copy of my visit note below.    Dear Colleague:    Reggie Roper recently returned for follow-up at the [facility]. My clinic note from our visit is enclosed below.  Speech recognition software may have been used in the documentation below; input is reviewed before signature to the best of my ability.     I appreciate the ongoing opportunity to participate in this patient's care.    Please feel free to contact me with any questions.    Sincerely yours,      Ophelia Ngo M.D., M.P.H.  , Laryngology  Director, [facility]  Otolaryngology- Head & Neck Surgery  [phone]            =====  HISTORY OF PRESENT ILLNESS:  Reggie Roper is a pleasant 66-year-old male with history of carotid endarterectomy in October 2021 and left true vocal fold motion impairment who returns in follow up today.    He underwent flexible laryngoscopy with Prolaryn Gel injection to the left true vocal fold on 12/9/2021.    He is pleased that he can talk again.  Others have been commenting that his voice is back. His breathing is also better.      MEDICATIONS:     Current Outpatient Medications   Medication Sig Dispense Refill     acetaminophen (TYLENOL) 325 MG tablet Take 2 tablets (650 mg) by mouth every 4 hours as needed for other (For optimal non-opioid multimodal pain management to improve pain control.)       amoxicillin (AMOXIL) 500 MG tablet Take 4 tabs (to equal 2,000 mg) 1 hour prior to dental procedure. 4 tablet 5     atorvastatin (LIPITOR) 80 MG tablet 1 tablet (80 mg) by Oral or NG Tube route daily 30 tablet 0     BYSTOLIC 10 mg tablet [BYSTOLIC 10 MG TABLET] TAKE 1 TABLET BY MOUTH  DAILY FOR BLOOD PRESSURE 90 tablet 0     clopidogrel (PLAVIX) 75 MG tablet Take 1 tablet (75 mg) by mouth daily 30 tablet 1     dapagliflozin (FARXIGA) 10 MG TABS tablet Take 10 mg by mouth daily       metFORMIN (GLUCOPHAGE) 500 MG tablet Take 1,500 mg by mouth daily       omeprazole (PRILOSEC) 20 MG capsule [OMEPRAZOLE (PRILOSEC) 20 MG CAPSULE] Take 20 mg by mouth daily.       rivaroxaban ANTICOAGULANT (XARELTO) 20 MG TABS tablet Take 1 tablet (20 mg) by mouth daily (with dinner) 90 tablet 1     sitagliptin (JANUVIA) 100 MG tablet Take 100 mg by mouth daily       TEKTURNA 150 mg tablet [TEKTURNA 150 MG TABLET] TAKE 1 TABLET BY MOUTH ONCE DAILY FOR BLOOD PRESSURE 90 tablet 3       ALLERGIES:    Allergies   Allergen Reactions     Amlodipine      Other reaction(s): Edema, leg swelling at high dose  Leg swelling at high doses       Hydrochlorothiazide      Other reaction(s): hypokalemia, Hypokalemia     Lisinopril Cough     Other reaction(s): cough     Losartan      Other reaction(s): face flushing, Flushing       NEW PMH/PSH: None    REVIEW OF SYSTEMS:  The patient completed a comprehensive 11 point review of systems (below), which was reviewed. Positives are as noted below.  Patient Supplied Answers to Review of Systems         PHYSICAL EXAM:  General: The patient was alert and conversant, and in no acute distress.    Neck: No palpable cervical lymphadenopathy, no significant tenderness to palpation of the thyrohyoid space, which was narrow. No obvious thyroid abnormality.  Resp: Breathing comfortably, no stridor or stertor.  Neuro: Symmetric facial function. Other cranial nerve function as documented above.  Psych: Normal affect, pleasant and cooperative.  Voice/speech: No significant dysphonia.      Intake scores  Last 2 Scores for Patient-Answered VHI Questionnaire  No flowsheet data found.   Last 2 Scores for Patient-Answered EAT Questionnaire  No flowsheet data found.   Last 2 Scores for Patient-Answered CSI  Questionnaire  No flowsheet data found.      Procedure:   Flexible fiberoptic laryngoscopy and laryngovideostroboscopy  Indications: This procedure was warranted to evaluate the patient's laryngeal anatomy and function. Risks, benefits, and alternatives were discussed.  Description: After written informed consent was obtained, a time-out was performed to confirm patient identity, procedure, and procedure site. Topical 3% lidocaine with 0.25% phenylephrine was applied to the nasal cavities. I performed the endoscopy and no complications were apparent. Continuous and stroboscopic light were utilized to assess routine phonation and variable frequency phonation.  Performed by: Ophelia Ngo MD MPH  Findings: Normal nasopharynx. Normal base of tongue, valleculae, and epiglottis. Vocal fold mobility: right: normal; left: impaired. Medial edges of the vocal folds: smooth and straight. No focal mucosal lesions were observed on the true vocal folds. Glissade produced appropriate elongation. There was mild to moderate supraglottic recruitment with connected speech. Mucosa of false vocal folds, aryepiglottic folds, piriform sinuses, and posterior glottis unremarkable. Airway was patent.    MPT 6 seconds.    The addition of stroboscopy allowed evaluation of the mucosal wave.   Amplitude: right: normal; left: normal. Symmetry: intermittent symmetry. Closure pattern: complete. Closure plane: at glottic level. Phase distribution: normal.            IMPRESSION AND PLAN:   Reggie Roper returns with an excellent vocal response to the Prolaryn Gel injection. He is very pleased.      We discussed that the injection would be expected to last two to three months on average, and that the hope is that he will also start to regain some motion of the left true vocal fold in the interim.      We discussed consideration of voice therapy, but he feels like he is able to do what he would like to do with his voice, and is comfortable moving  forward without therapy.  If he notices any worsening of his voice, or vocal fatigue or other concerns, he will let us know and we can set up voice therapy at that point.      I will see him back in about three months, or sooner as needed.  If he desires a repeat injection, he will let us know, and we can schedule that rather than having a clinic visit first. I appreciate the opportunity to participate in the care of this pleasant patient.     Today's visit required additional screening time, PPE, and cleaning measures to allow for a safe in-person visit, due to the public health emergency. I spent a total of 18 minutes on 1/7/2022 in chart review, review of tests, patient visit, documentation, care coordination, and/or discussion with other providers about the issues documented above, separate from any documented procedure(s).        Again, thank you for allowing me to participate in the care of your patient.      Sincerely,    Ophelia Ngo MD

## 2022-01-07 NOTE — Clinical Note
1/7/2022      RE: Reggie Roper  [address]  [place] [address]       Dear Colleague:    Reggie Roper recently returned for follow-up at the [facility]. My clinic note from our visit is enclosed below.  Speech recognition software may have been used in the documentation below; input is reviewed before signature to the best of my ability.     I appreciate the ongoing opportunity to participate in this patient's care.    Please feel free to contact me with any questions.    Sincerely yours,      Ophelia Ngo M.D., M.P.H.  , Laryngology  Director, [facility]  Otolaryngology- Head & Neck Surgery  [phone]            =====  HISTORY OF PRESENT ILLNESS:  Reggie Roper is a pleasant 66-year-old male with history of carotid endarterectomy in October 2021 and left true vocal fold motion impairment who returns in follow up today.    He underwent flexible laryngoscopy with Prolaryn Gel injection to the left true vocal fold on 12/9/2021.    He is pleased that he can talk again.  Others have been commenting that his voice is back. His breathing is also better.      MEDICATIONS:     Current Outpatient Medications   Medication Sig Dispense Refill     acetaminophen (TYLENOL) 325 MG tablet Take 2 tablets (650 mg) by mouth every 4 hours as needed for other (For optimal non-opioid multimodal pain management to improve pain control.)       amoxicillin (AMOXIL) 500 MG tablet Take 4 tabs (to equal 2,000 mg) 1 hour prior to dental procedure. 4 tablet 5     atorvastatin (LIPITOR) 80 MG tablet 1 tablet (80 mg) by Oral or NG Tube route daily 30 tablet 0     BYSTOLIC 10 mg tablet [BYSTOLIC 10 MG TABLET] TAKE 1 TABLET BY MOUTH DAILY FOR BLOOD PRESSURE 90 tablet 0     clopidogrel (PLAVIX) 75 MG tablet Take 1 tablet (75 mg) by mouth daily 30 tablet 1     dapagliflozin (FARXIGA) 10 MG TABS tablet Take 10 mg by mouth daily       metFORMIN (GLUCOPHAGE) 500 MG tablet Take  1,500 mg by mouth daily       omeprazole (PRILOSEC) 20 MG capsule [OMEPRAZOLE (PRILOSEC) 20 MG CAPSULE] Take 20 mg by mouth daily.       rivaroxaban ANTICOAGULANT (XARELTO) 20 MG TABS tablet Take 1 tablet (20 mg) by mouth daily (with dinner) 90 tablet 1     sitagliptin (JANUVIA) 100 MG tablet Take 100 mg by mouth daily       TEKTURNA 150 mg tablet [TEKTURNA 150 MG TABLET] TAKE 1 TABLET BY MOUTH ONCE DAILY FOR BLOOD PRESSURE 90 tablet 3       ALLERGIES:    Allergies   Allergen Reactions     Amlodipine      Other reaction(s): Edema, leg swelling at high dose  Leg swelling at high doses       Hydrochlorothiazide      Other reaction(s): hypokalemia, Hypokalemia     Lisinopril Cough     Other reaction(s): cough     Losartan      Other reaction(s): face flushing, Flushing       NEW PMH/PSH: None    REVIEW OF SYSTEMS:  The patient completed a comprehensive 11 point review of systems (below), which was reviewed. Positives are as noted below.  Patient Supplied Answers to Review of Systems         PHYSICAL EXAM:  General: The patient was alert and conversant, and in no acute distress.    Neck: No palpable cervical lymphadenopathy, no significant tenderness to palpation of the thyrohyoid space, which was narrow. No obvious thyroid abnormality.  Resp: Breathing comfortably, no stridor or stertor.  Neuro: Symmetric facial function. Other cranial nerve function as documented above.  Psych: Normal affect, pleasant and cooperative.  Voice/speech: No significant dysphonia.      Intake scores  Last 2 Scores for Patient-Answered VHI Questionnaire  No flowsheet data found.   Last 2 Scores for Patient-Answered EAT Questionnaire  No flowsheet data found.   Last 2 Scores for Patient-Answered CSI Questionnaire  No flowsheet data found.      Procedure:   Flexible fiberoptic laryngoscopy and laryngovideostroboscopy  Indications: This procedure was warranted to evaluate the patient's laryngeal anatomy and function. Risks, benefits, and  alternatives were discussed.  Description: After written informed consent was obtained, a time-out was performed to confirm patient identity, procedure, and procedure site. Topical 3% lidocaine with 0.25% phenylephrine was applied to the nasal cavities. I performed the endoscopy and no complications were apparent. Continuous and stroboscopic light were utilized to assess routine phonation and variable frequency phonation.  Performed by: Ophelia Ngo MD MPH  Findings: Normal nasopharynx. Normal base of tongue, valleculae, and epiglottis. Vocal fold mobility: right: normal; left: impaired. Medial edges of the vocal folds: smooth and straight. No focal mucosal lesions were observed on the true vocal folds. Glissade produced appropriate elongation. There was mild to moderate supraglottic recruitment with connected speech. Mucosa of false vocal folds, aryepiglottic folds, piriform sinuses, and posterior glottis unremarkable. Airway was patent.    MPT 6 seconds.    The addition of stroboscopy allowed evaluation of the mucosal wave.   Amplitude: right: normal; left: normal. Symmetry: intermittent symmetry. Closure pattern: complete. Closure plane: at glottic level. Phase distribution: normal.            IMPRESSION AND PLAN:   Reggie Roper returns with an excellent vocal response to the Prolaryn Gel injection. He is very pleased.      We discussed that the injection would be expected to last two to three months on average, and that the hope is that he will also start to regain some motion of the left true vocal fold in the interim.      We discussed consideration of voice therapy, but he feels like he is able to do what he would like to do with his voice, and is comfortable moving forward without therapy.  If he notices any worsening of his voice, or vocal fatigue or other concerns, he will let us know and we can set up voice therapy at that point.      I will see him back in about three months, or sooner as needed.   If he desires a repeat injection, he will let us know, and we can schedule that rather than having a clinic visit first. I appreciate the opportunity to participate in the care of this pleasant patient.     Today's visit required additional screening time, PPE, and cleaning measures to allow for a safe in-person visit, due to the public health emergency. I spent a total of 18 minutes on 1/7/2022 in chart review, review of tests, patient visit, documentation, care coordination, and/or discussion with other providers about the issues documented above, separate from any documented procedure(s).        Ophelia Ngo MD

## 2022-01-07 NOTE — LETTER
1/7/2022      RE: Reggie Roper  [address]  [facility] [address]       Dear Colleague,    Thank you for the opportunity to participate in the care of your patient, Reggie Roper, at the [facility] [place] at [facility]. Please see a copy of my visit note below.    Dear Colleague:    Reggie Roper recently returned for follow-up at the [facility]. My clinic note from our visit is enclosed below.  Speech recognition software may have been used in the documentation below; input is reviewed before signature to the best of my ability.     I appreciate the ongoing opportunity to participate in this patient's care.    Please feel free to contact me with any questions.    Sincerely yours,      Ophelia Ngo M.D., M.P.H.  , Laryngology  Director, [facility]  Otolaryngology- Head & Neck Surgery  [phone]            =====  HISTORY OF PRESENT ILLNESS:  Reggie Roper is a pleasant 66-year-old male with history of carotid endarterectomy in October 2021 and left true vocal fold motion impairment who returns in follow up today.    He underwent flexible laryngoscopy with Prolaryn Gel injection to the left true vocal fold on 12/9/2021.    He is pleased that he can talk again.  Others have been commenting that his voice is back. His breathing is also better.      MEDICATIONS:     Current Outpatient Medications   Medication Sig Dispense Refill     acetaminophen (TYLENOL) 325 MG tablet Take 2 tablets (650 mg) by mouth every 4 hours as needed for other (For optimal non-opioid multimodal pain management to improve pain control.)       amoxicillin (AMOXIL) 500 MG tablet Take 4 tabs (to equal 2,000 mg) 1 hour prior to dental procedure. 4 tablet 5     atorvastatin (LIPITOR) 80 MG tablet 1 tablet (80 mg) by Oral or NG Tube route daily 30 tablet 0     BYSTOLIC 10 mg tablet [BYSTOLIC 10  MG TABLET] TAKE 1 TABLET BY MOUTH DAILY FOR BLOOD PRESSURE 90 tablet 0     clopidogrel (PLAVIX) 75 MG tablet Take 1 tablet (75 mg) by mouth daily 30 tablet 1     dapagliflozin (FARXIGA) 10 MG TABS tablet Take 10 mg by mouth daily       metFORMIN (GLUCOPHAGE) 500 MG tablet Take 1,500 mg by mouth daily       omeprazole (PRILOSEC) 20 MG capsule [OMEPRAZOLE (PRILOSEC) 20 MG CAPSULE] Take 20 mg by mouth daily.       rivaroxaban ANTICOAGULANT (XARELTO) 20 MG TABS tablet Take 1 tablet (20 mg) by mouth daily (with dinner) 90 tablet 1     sitagliptin (JANUVIA) 100 MG tablet Take 100 mg by mouth daily       TEKTURNA 150 mg tablet [TEKTURNA 150 MG TABLET] TAKE 1 TABLET BY MOUTH ONCE DAILY FOR BLOOD PRESSURE 90 tablet 3       ALLERGIES:    Allergies   Allergen Reactions     Amlodipine      Other reaction(s): Edema, leg swelling at high dose  Leg swelling at high doses       Hydrochlorothiazide      Other reaction(s): hypokalemia, Hypokalemia     Lisinopril Cough     Other reaction(s): cough     Losartan      Other reaction(s): face flushing, Flushing       NEW PMH/PSH: None    REVIEW OF SYSTEMS:  The patient completed a comprehensive 11 point review of systems (below), which was reviewed. Positives are as noted below.  Patient Supplied Answers to Review of Systems         PHYSICAL EXAM:  General: The patient was alert and conversant, and in no acute distress.    Neck: No palpable cervical lymphadenopathy, no significant tenderness to palpation of the thyrohyoid space, which was narrow. No obvious thyroid abnormality.  Resp: Breathing comfortably, no stridor or stertor.  Neuro: Symmetric facial function. Other cranial nerve function as documented above.  Psych: Normal affect, pleasant and cooperative.  Voice/speech: No significant dysphonia.      Intake scores  Last 2 Scores for Patient-Answered VHI Questionnaire  No flowsheet data found.   Last 2 Scores for Patient-Answered EAT Questionnaire  No flowsheet data found.   Last 2  Scores for Patient-Answered CSI Questionnaire  No flowsheet data found.      Procedure:   Flexible fiberoptic laryngoscopy and laryngovideostroboscopy  Indications: This procedure was warranted to evaluate the patient's laryngeal anatomy and function. Risks, benefits, and alternatives were discussed.  Description: After written informed consent was obtained, a time-out was performed to confirm patient identity, procedure, and procedure site. Topical 3% lidocaine with 0.25% phenylephrine was applied to the nasal cavities. I performed the endoscopy and no complications were apparent. Continuous and stroboscopic light were utilized to assess routine phonation and variable frequency phonation.  Performed by: Ophelia Ngo MD MPH  Findings: Normal nasopharynx. Normal base of tongue, valleculae, and epiglottis. Vocal fold mobility: right: normal; left: impaired. Medial edges of the vocal folds: smooth and straight. No focal mucosal lesions were observed on the true vocal folds. Glissade produced appropriate elongation. There was mild to moderate supraglottic recruitment with connected speech. Mucosa of false vocal folds, aryepiglottic folds, piriform sinuses, and posterior glottis unremarkable. Airway was patent.    MPT 6 seconds.    The addition of stroboscopy allowed evaluation of the mucosal wave.   Amplitude: right: normal; left: normal. Symmetry: intermittent symmetry. Closure pattern: complete. Closure plane: at glottic level. Phase distribution: normal.            IMPRESSION AND PLAN:   Reggie Roper returns with an excellent vocal response to the Prolaryn Gel injection. He is very pleased.      We discussed that the injection would be expected to last two to three months on average, and that the hope is that he will also start to regain some motion of the left true vocal fold in the interim.      We discussed consideration of voice therapy, but he feels like he is able to do what he would like to do with his  voice, and is comfortable moving forward without therapy.  If he notices any worsening of his voice, or vocal fatigue or other concerns, he will let us know and we can set up voice therapy at that point.      I will see him back in about three months, or sooner as needed.  If he desires a repeat injection, he will let us know, and we can schedule that rather than having a clinic visit first. I appreciate the opportunity to participate in the care of this pleasant patient.     Today's visit required additional screening time, PPE, and cleaning measures to allow for a safe in-person visit, due to the public health emergency. I spent a total of 18 minutes on 1/7/2022 in chart review, review of tests, patient visit, documentation, care coordination, and/or discussion with other providers about the issues documented above, separate from any documented procedure(s).        Please do not hesitate to contact me if you have any questions/concerns.     Sincerely,     Ophelia Ngo MD

## 2022-01-07 NOTE — NURSING NOTE
"Chief Complaint   Patient presents with     RECHECK     3 weeks post op       Blood pressure (!) 142/76, pulse 51, height 1.778 m (5' 10\"), weight 98.9 kg (218 lb), SpO2 95 %.    Lucian Hastings, EMT  "

## 2022-01-07 NOTE — PROGRESS NOTES
Dear Colleague:    Reggie Roper recently returned for follow-up at the [facility]. My clinic note from our visit is enclosed below.  Speech recognition software may have been used in the documentation below; input is reviewed before signature to the best of my ability.     I appreciate the ongoing opportunity to participate in this patient's care.    Please feel free to contact me with any questions.    Sincerely yours,      Ophelia Ngo M.D., M.P.H.  , Laryngology  Director, [facility]  Otolaryngology- Head & Neck Surgery  [phone]            =====  HISTORY OF PRESENT ILLNESS:  Reggie Roper is a pleasant 66-year-old male with history of carotid endarterectomy in October 2021 and left true vocal fold motion impairment who returns in follow up today.    He underwent flexible laryngoscopy with Prolaryn Gel injection to the left true vocal fold on 12/9/2021.    He is pleased that he can talk again.  Others have been commenting that his voice is back. His breathing is also better.      MEDICATIONS:     Current Outpatient Medications   Medication Sig Dispense Refill     acetaminophen (TYLENOL) 325 MG tablet Take 2 tablets (650 mg) by mouth every 4 hours as needed for other (For optimal non-opioid multimodal pain management to improve pain control.)       amoxicillin (AMOXIL) 500 MG tablet Take 4 tabs (to equal 2,000 mg) 1 hour prior to dental procedure. 4 tablet 5     atorvastatin (LIPITOR) 80 MG tablet 1 tablet (80 mg) by Oral or NG Tube route daily 30 tablet 0     BYSTOLIC 10 mg tablet [BYSTOLIC 10 MG TABLET] TAKE 1 TABLET BY MOUTH DAILY FOR BLOOD PRESSURE 90 tablet 0     clopidogrel (PLAVIX) 75 MG tablet Take 1 tablet (75 mg) by mouth daily 30 tablet 1     dapagliflozin (FARXIGA) 10 MG TABS tablet Take 10 mg by mouth daily       metFORMIN (GLUCOPHAGE) 500 MG tablet Take 1,500 mg by mouth daily       omeprazole (PRILOSEC) 20 MG capsule [OMEPRAZOLE  (PRILOSEC) 20 MG CAPSULE] Take 20 mg by mouth daily.       rivaroxaban ANTICOAGULANT (XARELTO) 20 MG TABS tablet Take 1 tablet (20 mg) by mouth daily (with dinner) 90 tablet 1     sitagliptin (JANUVIA) 100 MG tablet Take 100 mg by mouth daily       TEKTURNA 150 mg tablet [TEKTURNA 150 MG TABLET] TAKE 1 TABLET BY MOUTH ONCE DAILY FOR BLOOD PRESSURE 90 tablet 3       ALLERGIES:    Allergies   Allergen Reactions     Amlodipine      Other reaction(s): Edema, leg swelling at high dose  Leg swelling at high doses       Hydrochlorothiazide      Other reaction(s): hypokalemia, Hypokalemia     Lisinopril Cough     Other reaction(s): cough     Losartan      Other reaction(s): face flushing, Flushing       NEW PMH/PSH: None    REVIEW OF SYSTEMS:  The patient completed a comprehensive 11 point review of systems (below), which was reviewed. Positives are as noted below.  Patient Supplied Answers to Review of Systems         PHYSICAL EXAM:  General: The patient was alert and conversant, and in no acute distress.    Neck: No palpable cervical lymphadenopathy, no significant tenderness to palpation of the thyrohyoid space, which was narrow. No obvious thyroid abnormality.  Resp: Breathing comfortably, no stridor or stertor.  Neuro: Symmetric facial function. Other cranial nerve function as documented above.  Psych: Normal affect, pleasant and cooperative.  Voice/speech: No significant dysphonia.      Intake scores  Last 2 Scores for Patient-Answered VHI Questionnaire  No flowsheet data found.   Last 2 Scores for Patient-Answered EAT Questionnaire  No flowsheet data found.   Last 2 Scores for Patient-Answered CSI Questionnaire  No flowsheet data found.      Procedure:   Flexible fiberoptic laryngoscopy and laryngovideostroboscopy  Indications: This procedure was warranted to evaluate the patient's laryngeal anatomy and function. Risks, benefits, and alternatives were discussed.  Description: After written informed consent was  obtained, a time-out was performed to confirm patient identity, procedure, and procedure site. Topical 3% lidocaine with 0.25% phenylephrine was applied to the nasal cavities. I performed the endoscopy and no complications were apparent. Continuous and stroboscopic light were utilized to assess routine phonation and variable frequency phonation.  Performed by: Ophelia Ngo MD MPH  Findings: Normal nasopharynx. Normal base of tongue, valleculae, and epiglottis. Vocal fold mobility: right: normal; left: impaired. Medial edges of the vocal folds: smooth and straight. No focal mucosal lesions were observed on the true vocal folds. Glissade produced appropriate elongation. There was mild to moderate supraglottic recruitment with connected speech. Mucosa of false vocal folds, aryepiglottic folds, piriform sinuses, and posterior glottis unremarkable. Airway was patent.    MPT 6 seconds.    The addition of stroboscopy allowed evaluation of the mucosal wave.   Amplitude: right: normal; left: normal. Symmetry: intermittent symmetry. Closure pattern: complete. Closure plane: at glottic level. Phase distribution: normal.            IMPRESSION AND PLAN:   Reggie Roper returns with an excellent vocal response to the Prolaryn Gel injection. He is very pleased.      We discussed that the injection would be expected to last two to three months on average, and that the hope is that he will also start to regain some motion of the left true vocal fold in the interim.      We discussed consideration of voice therapy, but he feels like he is able to do what he would like to do with his voice, and is comfortable moving forward without therapy.  If he notices any worsening of his voice, or vocal fatigue or other concerns, he will let us know and we can set up voice therapy at that point.      I will see him back in about three months, or sooner as needed.  If he desires a repeat injection, he will let us know, and we can schedule that  rather than having a clinic visit first. I appreciate the opportunity to participate in the care of this pleasant patient.     Today's visit required additional screening time, PPE, and cleaning measures to allow for a safe in-person visit, due to the public health emergency. I spent a total of 18 minutes on 1/7/2022 in chart review, review of tests, patient visit, documentation, care coordination, and/or discussion with other providers about the issues documented above, separate from any documented procedure(s).

## 2022-01-07 NOTE — PATIENT INSTRUCTIONS
1.  You were seen in the ENT Clinic today by . If you have any questions or concerns after your appointment, please call [phone]. Press option #1 for scheduling related needs. Press option #3 for Nurse advice.    2.  Plan is to return to clinic in 4 months      Alexus Lucero LPN  [phone]  [facility] - Otolaryngology

## 2022-01-10 ENCOUNTER — OFFICE VISIT (OUTPATIENT)
Dept: PHARMACY | Facility: PHYSICIAN GROUP | Age: 67
End: 2022-01-10

## 2022-01-10 VITALS
DIASTOLIC BLOOD PRESSURE: 66 MMHG | HEART RATE: 88 BPM | WEIGHT: 222 LBS | SYSTOLIC BLOOD PRESSURE: 124 MMHG | BODY MASS INDEX: 31.85 KG/M2

## 2022-01-10 DIAGNOSIS — I10 BENIGN ESSENTIAL HYPERTENSION: ICD-10-CM

## 2022-01-10 DIAGNOSIS — I48.0 PAF (PAROXYSMAL ATRIAL FIBRILLATION) (H): ICD-10-CM

## 2022-01-10 DIAGNOSIS — E11.9 TYPE 2 DIABETES MELLITUS WITHOUT COMPLICATION, WITHOUT LONG-TERM CURRENT USE OF INSULIN (H): Primary | ICD-10-CM

## 2022-01-10 DIAGNOSIS — K21.9 GASTROESOPHAGEAL REFLUX DISEASE WITHOUT ESOPHAGITIS: ICD-10-CM

## 2022-01-10 PROCEDURE — 99607 MTMS BY PHARM ADDL 15 MIN: CPT | Performed by: PHARMACIST

## 2022-01-10 PROCEDURE — 99605 MTMS BY PHARM NP 15 MIN: CPT | Performed by: PHARMACIST

## 2022-01-10 RX ORDER — METOPROLOL SUCCINATE 100 MG/1
100 TABLET, EXTENDED RELEASE ORAL DAILY
Status: ON HOLD | COMMUNITY
End: 2024-01-25

## 2022-01-10 RX ORDER — ASPIRIN 81 MG/1
81 TABLET, CHEWABLE ORAL DAILY
COMMUNITY
End: 2023-10-02

## 2022-01-10 RX ORDER — NITROGLYCERIN 0.4 MG/1
0.4 TABLET SUBLINGUAL EVERY 5 MIN PRN
COMMUNITY
End: 2024-09-10

## 2022-01-10 NOTE — PATIENT INSTRUCTIONS
Recommendations from today's MTM visit:                                                    MTM (medication therapy management) is a service provided by a clinical pharmacist designed to help you get the most of out of your medicines.      1) Stop taking Bystolic 10 mg daily. Continue metoprolol and aliskeren.    2) Take all of your medications at the same time with supper every day (about 5pm).    3) Check your blood pressure every day, about the same time a few hours after taking your medications. See instructions below.    4) Check your blood sugar every day after waking up before any food. Record and bring with you to visits.    5) Test your blood sugar if feeling shaky, sweaty or dizzy as this may be a sign of low blood sugar. If the reading is under 70, drink 1/2 cup of juice or regular soda. Test again in 15 mins. If not above 70 for second time, drink more juice or pop and call your doctor. If blood sugar has risen above 70 in that first 15 mins, have a 1/2 of a peanut butter sandwich or 2 crackers with peanut butter to stabilize your blood sugar.      6) Refills were sent for nitroglycerin to [facility]; Farxiga and Januvia were sent to [facility] mail order.    Follow-up: 2-4 weeks with Dr. Sanderson or Génesis, Pharmacist.    It was great to speak with you today.  I value your experience and would be very thankful for your time with providing feedback on our clinic survey. You may receive a survey via email or text message in the next few days.     To schedule another MTM appointment, please call the clinic directly or you may call the MTM scheduling line at [phone] or toll-free at [phone].     My Clinical Pharmacist's contact information:                                                      Please feel free to contact me with any questions or concerns you have.      Génesis Chavez, Pharm D., MPH  Pharmacy Resident - [facility]  Pager: [phone]          To prepare to take your blood  pressure:  1. Do not consume any caffeinated beverages (tea, coffee, soda), do not smoke, do not exercise for 30 minutes before measuring your blood pressure.  2. Sit quietly for at least 5 minutes before starting to measure your blood pressure.    To measure your blood pressure:  1. Sit with both feet flat on the floor. Do not stand, do not lie down.  2. Place the cuff on your arm above your elbow so that your elbow can bend comfortably.  3. Pull the cuff tight enough to stay in place and allow for your fingers to fit between your arm and the cuff.  4. Position the cuff so that the cord lies down the inside of your arm.  5. Do not talk while you are using the machine.  6. Press the START button. It will squeeze your arm and then release slowly.   7. When you see the numbers on the screen, you are done.   8. Write the numbers down and the time of day so that you can track what they are.

## 2022-01-10 NOTE — PROGRESS NOTES
Medication Therapy Management (MTM) Encounter    ASSESSMENT:                            Medication Adherence/Access: Since never misses Xarelto, recommended to take all meds with 5pm meal to prevent missed doses.    Type 2 Diabetes: Patient is meeting A1c goal of < 7%. Not monitoring SMBG regularly. Educated on use of Relion SMBG today (reading 125 mg/dl fasting) and signs/symptoms of hypoglycemia, though rare on current therapy, beta blocker may mask symptoms. Recommend testing fasting blood glucose once daily. Dapagliflozin and metformin (starting at 1500mg - current dose) have cardiac benefit. Dapagliflozin also has blood pressure lowering effects. Discussed use of GLP-1 due to recent stroke for cardiac benefit. Patient declined injectables at this time. Encouraged continued lifestyle modifications. Educated on groin infection and to monitor if current redness becomes painful, warm, or severe to be seen by doc immediately. Due for hepB series, 2nd dose PPSV23, Shingrix and urine albumin.    Afib/Hypertension/CAD (Stroke 10/2021): Stable. Patient on appropriate anticoagulation per vascular note and denies side effects. Patient is on redundant beta-blocker therapy. Though denies side effects, blood pressure and heart rate are at goal, recommend stopping nebivolol as metoprolol succinate has longer duration of action. Educated on home blood pressure monitoring and recommended to monitor daily with med switch. May increase if blood pressure and heart rate do not remain at goal on metoprolol, aliskerin. Do not recommend restarting lisinopril due to cough and should not be used concomitantly with aliskerin in pts with diabetes due to risk of renal impairment, hyperkalemia, and hypotension (all stable at last check). May consider low dose amlodipine (hx of edema on higher doses), spironolactone (after discontinuation of aliskerin due to risk of hyperkalemia), or terazosin at future visit.    GERD: Stable. Discussed  longterm risk, but due to anticoagulation & antiplatelet, recommend continue therapy. Did not have time to assess Ca/Vitamin D intake today (goal Ca 1000mg/day and VitaminD 800-1000 international unit(s) daily) . Review at future visit.       PLAN:                              Patient:  1) Stop taking Bystolic 10 mg daily.     2) Take all of your medications at the same time with supper every day (about 5pm).    3) Check your blood pressure every day, about the same time a few hours after taking your medications. See instructions below.    4) Check your blood sugar every day after waking up before any food. Record and bring with you to visits.      Provider:  1) Due for hepB series, 2nd dose PPSV23, Shingrix series  2) Due for urine albumin  3) Discuss vitamin D and Ca intake at future visit    Follow-up: 2-4 weeks with Dr. Sanderson or Génesis, Pharmacist.      SUBJECTIVE/OBJECTIVE:                          Reggie Roper is a 66 year old male coming in for an initial visit. He was referred to me from Simin Sanderson MD.      Reason for visit: Medication Therapy Management     Allergies/ADRs: Reviewed in chart  Past Medical History: Reviewed in chart  Tobacco: He reports that he has quit smoking. His smoking use included cigars and cigars. He has never used smokeless tobacco.  Alcohol: 4-6 beverages / week  Caffeine: Regular Pepsi 12-20oz per day  Personal Healthcare Goals: Retire healthy to [place] in March.    Medication Adherence/Access: Per patient, misses medication 2 times per week (except for Xarelto, never misses). Brings all med bottles today. Has pillbox at home he uses to set up meds, Took all meds except Xarelto this morning before coming to visit.    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 Xarleto with 5pm meal.    Daily Schedule:  Sleepinam - 3pm usually  Breakfast: 7am (before sleep)  Dinner: 5pm (before work)  Break: 21:30  (snack)  Lunch: midnight (meal)  Break: 03:00 (snack)    Type 2 Diabetes:  Currently taking Metformin ER 500mg x 3 Once daily, Farxiga 10 mg once daily, Januvia 100 mg once daily . Patient has tried metformin 2000mg daily previously but unable to tolerate due to diarrhea. Patient denies discomfort with urination. Has occasional groin rash which he uses topical antifungal power for which treats. Has this currently denies that it is painful, warm, or itching. Just a bit red.  Blood sugar monitoring: rarely. Has Relion brand his daughter purchased for him. Does not know how to use.  Symptoms of low blood sugar? none  Symptoms of high blood sugar? none  Eye exam: due  Foot exam: due  Diet/Exercise: Patient has worked hard on lifestyle since elevated A1C in Sept. He has cut out most sugar except for Pepsi daily for caffeine and energy at work daily, where he is active.  Aspirin: Taking 81mg daily and denies side effects  Statin: Yes: Atorvastatin 80mg (see below)    ACEi/ARB: No. Due to side effects  Urine Albumin: No results found for: UMALCR - Due  Lab Results   Component Value Date    A1C 6.9 12/30/2021    A1C 8.2 10/07/2021    A1c 8.3 9/15/2021       Component Ref Range & Units 2 mo ago   (10/14/21) 3 mo ago   (10/9/21) 3 mo ago   (9/15/21) 12 mo ago   (1/14/21)   Sodium 136 - 145 mmol/L 140  141 R  139  139    Potassium 3.5 - 5.0 mmol/L 4.0  4.0 R  4.1  3.9    Chloride 98 - 107 mmol/L 103  110 High  R  101  102    Carbon Dioxide (CO2) 22 - 31 mmol/L 26  25 R  25  30    Anion Gap 5 - 18 mmol/L 11  6 R  13  7    Urea Nitrogen 8 - 22 mg/dL 19  19 R  20  17    Creatinine 0.70 - 1.30 mg/dL 0.84  0.87 R  0.97  0.90    Calcium 8.5 - 10.5 mg/dL 9.3  8.1 Low  R  9.5  9.1    Glucose 70 - 125 mg/dL 160 High   152 High  R  125  111    GFR Estimate >60 mL/min/1.73m2 >90  89 CM  80 CM  >60      Due for hepB series, 2nd dose PPSV23, Shingrix.         Afib/Hypertension/CAD (Stroke 10/2021): Patient is currently taking Xarelto 20mg  daily and aspirin 81mg for anticoagulation. Patient reports no current concerns of bruising or bleeding. Patient does not have a history of GI bleed.   Patient is also taking aliskiren 150mg once daily, Metoprolol Succ 100 mg once daily, Atorvastatin 80 mg once daily. Restarted Bystolic 10mg once daily on 12/30/21 (Per PCP EMR, changed in Sept. To metoprolol succinate because not covered by insurance, now on both). Not taking clopidogrel. Brings bottle of lisinopril 10 mg, but is not taking and not sure why (believes he stopped months ago before stroke). Brings old bottle of nitroglycerine which he was prescribed by cardiology in 2014 but has never used nor opened. Denies hx of MI or stenting. Patient reports no current medication side effects.  Denies chest pain, weakness, shortness of breath.   Patient does not self-monitor blood pressure. Has cuff, brings today. Doesn't know how to use.  [id number] Score    [id number] Score: 6         BP Readings from Last 3 Encounters:   01/10/22 124/66   01/07/22 (!) 142/76   12/13/21 118/77       Recent Labs   Lab Test 12/30/21  1127 10/07/21  2125   CHOL 119 142   HDL 35* 40   LDL 59 81   TRIG 123 107         Per PCP Note 12/30/21:          Per Vascular Surgery 12/13/21 Note & Patient Instructions:  Follow up with Dr. Mayo in 3 months with ultrasound of Left carotid and in person office visit.   Stop taking Plavix and start Aspirin 81mg once daily.   Wait at least 3 months from date of carotid surgery before having dental procedure.     GERD: Current medications include: Prilosec (omeprazole) 20 mg once daily. Pt reports no current symptoms. The patient does not have a history of GI bleed.cPatient has not tried a trial off of therapy and is not interested in doing so. Patient feels that current regimen is effective. Not currently taking any Ca or Vitamin D        Today's Vitals: /66   Pulse 88   Wt 222 lb (100.7 kg)   BMI 31.85 kg/m    ----------------      I  spent 60 minutes with this patient today (out of pocket cost for today's MTM visit was reviewed with the patient) also discussed cost of future visits. All changes were made via collaborative practice agreement with Simin Sanderson MD. A copy of the visit note was provided to the patient's primary care provider.    The patient was given a summary of these recommendations.     Génesis Chavez, Pharm D., MPH  Pharmacy Resident - [facility]  Pager: [phone]    Patient was seen independently by Dr. Chavez. I agree with her assessment and plan.   Nuha Houser, Pharm.D, [facility]  Medication Therapy Management Pharmacist  [phone]         Medication Therapy Recommendations  Benign essential hypertension    Current Medication: BYSTOLIC 10 mg tablet (Discontinued)   Rationale: Duplicate Therapy - Unnecessary medication therapy - Indication   Recommendation: Discontinue Medication - Continue Metoprolol Succinate   Status: Accepted per CPA          Current Medication: TEKTURNA 150 mg tablet   Rationale: Patient forgets to take - Adherence - Adherence   Recommendation: Provide Education - Will take all meds at dinnertime   Status: Patient Agreed - Adherence/Education          Current Medication: metoprolol succinate ER (TOPROL-XL) 100 MG 24 hr tablet   Rationale: Medication requires monitoring - Needs additional monitoring   Recommendation: Self-Monitoring - Educate bp monitoring with med change   Status: Patient Agreed - Adherence/Education   Note: Also educated on SMBG         Type 2 diabetes mellitus without complication (H)    Rationale: Preventive therapy - Needs additional medication therapy - Indication   Recommendation: Make Appt with PCP - Pneumovax 23 25 MCG/0.5ML Inj - Due for 2nd PPSV23 dose   Status: Contact Provider - Awaiting Response   Note: Also HepB and Shingrix series

## 2022-01-19 ENCOUNTER — OFFICE VISIT (OUTPATIENT)
Dept: NEUROLOGY | Facility: CLINIC | Age: 67
End: 2022-01-19
Payer: COMMERCIAL

## 2022-01-19 VITALS
SYSTOLIC BLOOD PRESSURE: 138 MMHG | DIASTOLIC BLOOD PRESSURE: 74 MMHG | BODY MASS INDEX: 31.78 KG/M2 | HEART RATE: 51 BPM | WEIGHT: 222 LBS | HEIGHT: 70 IN

## 2022-01-19 DIAGNOSIS — Z86.73 HISTORY OF STROKE: Primary | ICD-10-CM

## 2022-01-19 PROBLEM — I65.22 LEFT CAROTID ARTERY STENOSIS: Status: RESOLVED | Noted: 2022-01-19 | Resolved: 2022-01-19

## 2022-01-19 PROBLEM — R49.0 DYSPHONIA: Status: RESOLVED | Noted: 2021-12-03 | Resolved: 2022-01-19

## 2022-01-19 PROBLEM — E78.5 DYSLIPIDEMIA: Status: ACTIVE | Noted: 2022-01-19

## 2022-01-19 PROBLEM — I65.22 LEFT CAROTID ARTERY STENOSIS: Status: ACTIVE | Noted: 2022-01-19

## 2022-01-19 PROBLEM — I63.522: Status: ACTIVE | Noted: 2021-10-07

## 2022-01-19 PROCEDURE — 99205 OFFICE O/P NEW HI 60 MIN: CPT | Performed by: PSYCHIATRY & NEUROLOGY

## 2022-01-19 ASSESSMENT — MIFFLIN-ST. JEOR: SCORE: 1788.24

## 2022-01-19 NOTE — PROGRESS NOTES
NEUROLOGY CONSULTATION NOTE       [facility] [place]  [address] [place] [address]  Tel: [phone]  Fax: [phone]  [email or web address]     Reggie Roper,  1955, MRN [id number]  PCP: Simin Sanderson  Date: 2022     ASSESSMENT & PLAN     Visit Diagnosis  1. History of left MCA infarction     H/O left MCA infarction  66-year-old male with history of HTN, HLD, DM, CAD, paroxysmal atrial fibrillation who was admitted to [facility] with a left MCA infarct and was noted to have critical left ICA stenosis.  He subsequently underwent carotid endarterectomy and although initially had were called cord paralysis his speech has improved.  He has no deficit at present and I have recommended:    1.  Continue Xarelto and baby aspirin indefinitely  2.  Continue Lipitor 80 mg daily.  His most recent LDL checked in 2021 was 56  3.  He is scheduled to have a follow-up carotid ultrasound in March that was ordered by vascular surgery  4. Vascular risk factors modification: Healthy diet (fruits, vegetables, low fat dairy & reduced saturated fat), weight loss, exercise at least 30 minutes 5 days/week, BMI goal <25.  Keep systolic blood pressure goal <130.  LDL goal <70.  Hemoglobin A1c goal <7. If applicable, STOP smoking  5.  He has made almost complete recovery from a his CVA and no further intervention is needed from neurology standpoint.  Follow-up will be in as needed basis    Thank you again for this referral, please feel free to contact me if you have any questions.    Enoch Yung MD  [facility]  (Formerly, [facility]     REASON FOR CONSULTATION Stroke        HISTORY OF PRESENT ILLNESS     We have been requested by Dr. Sanderson to evaluate Reggie Roper who is a 66 year old  male for CVA    Patient is a 66-year-old male with history of HTN, HLD, DM, CAD, paroxysmal atrial fibrillation on  Xarelto who was admitted to [facility] on 10/6/2021 with right hand weakness and disorientation.  Family was having difficulty contacting him and eventually brought him to the emergency room.  He had CT of the head that showed left MCA territory acute infarct and MRI confirmed multifocal small late acute/early subacute infarct in the left MCA territory.  CTA showed proximal mid M2 segment superior division and mid division occlusion with good collaterals.  Left ICA at the skull base showed moderate stenosis.  There was more than 90% left ICA stenosis and underwent endarterectomy.  Echocardiogram did not show any obvious clot.  Since his discharge he had difficulty with his speech due to vocal cord paralysis but gradually his speech has improved.  He went through physical therapy and they did not think he needs to continue as he is doing well.  He has regained pretty much all his strength and speech and continues to work on lifestyle modification.  He takes Xarelto along with baby aspirin and also statin.  Most recent LDL was less than 65.  He struggles with his hemoglobin A1c and most recent hemoglobin A1c was 6.7     PROBLEM LIST   Patient Active Problem List   Diagnosis Code     H/O left MCA distribution infarction Z86.73     Benign essential hypertension I10     Esophageal reflux K21.9     Coronary atherosclerosis I25.10     Heart murmur R01.1     Mixed hyperlipidemia E78.2     Obesity E66.9     PAF (paroxysmal atrial fibrillation) (H) I48.0     Type 2 diabetes mellitus without complication (H) E11.9     S/P carotid endarterectomy Z98.890     Unilateral partial vocal cord paralysis J38.01         PAST MEDICAL & SURGICAL HISTORY     Past Medical History:   Patient  has a past medical history of Abnormal stress test, Chest pain, Diabetes mellitus (H), Hyperlipidemia, Hypertension, Left carotid artery stenosis (1/19/2022), and Shortness of breath on exertion.    Surgical History:  He  has a past surgical history that  includes Cataract Extraction; Endarterectomy carotid (Left, 10/9/2021); and Laryngoscopy, Flexible With Injection (N/A, 12/9/2021).     SOCIAL HISTORY     Reviewed, and he  reports that he has quit smoking. His smoking use included cigars and cigars. He has never used smokeless tobacco. He reports current alcohol use. He reports that he does not use drugs.     FAMILY HISTORY     Reviewed, and family history includes CABG (age of onset: 78.00) in his father; Heart Disease in his father.     ALLERGIES     Allergies   Allergen Reactions     Amlodipine      Other reaction(s): Edema, leg swelling at high dose  Leg swelling at high doses       Hydrochlorothiazide      Other reaction(s): hypokalemia, Hypokalemia     Lisinopril Cough     Other reaction(s): cough     Losartan      Other reaction(s): face flushing, Flushing         REVIEW OF SYSTEMS     A 12 point review of system was performed and was negative except as outlined in the history of present illness.     HOME MEDICATIONS     Current Outpatient Rx   Medication Sig Dispense Refill     aspirin (ASA) 81 MG chewable tablet Take 81 mg by mouth daily       atorvastatin (LIPITOR) 80 MG tablet 1 tablet (80 mg) by Oral or NG Tube route daily 30 tablet 0     dapagliflozin (FARXIGA) 10 MG TABS tablet Take 10 mg by mouth daily       metFORMIN (GLUCOPHAGE) 500 MG tablet Take 1,500 mg by mouth daily       metoprolol succinate ER (TOPROL-XL) 100 MG 24 hr tablet Take 100 mg by mouth daily       nitroGLYcerin (NITROSTAT) 0.4 MG sublingual tablet Place 0.4 mg under the tongue every 5 minutes as needed for chest pain For chest pain place 1 tablet under the tongue every 5 minutes for 3 doses. If symptoms persist 5 minutes after 1st dose call 911.       omeprazole (PRILOSEC) 20 MG capsule [OMEPRAZOLE (PRILOSEC) 20 MG CAPSULE] Take 20 mg by mouth daily.       rivaroxaban ANTICOAGULANT (XARELTO) 20 MG TABS tablet Take 1 tablet (20 mg) by mouth daily (with dinner) 90 tablet 1      "sitagliptin (JANUVIA) 100 MG tablet Take 100 mg by mouth daily       TEKTURNA 150 mg tablet [TEKTURNA 150 MG TABLET] TAKE 1 TABLET BY MOUTH ONCE DAILY FOR BLOOD PRESSURE 90 tablet 3     acetaminophen (TYLENOL) 325 MG tablet Take 2 tablets (650 mg) by mouth every 4 hours as needed for other (For optimal non-opioid multimodal pain management to improve pain control.)       amoxicillin (AMOXIL) 500 MG tablet Take 4 tabs (to equal 2,000 mg) 1 hour prior to dental procedure. 4 tablet 5     blood glucose (RELION GLUCOSE TEST STRIPS) test strip by In Vitro route daily Use to test blood sugar 1 times daily or as directed.       Glucose Blood (RELION BLOOD GLUCOSE TEST VI) 1 each by In Vitro route daily           PHYSICAL EXAM     Vital signs  /74 (BP Location: Left arm, Patient Position: Sitting)   Pulse 51   Ht 1.778 m (5' 10\")   Wt 100.7 kg (222 lb)   BMI 31.85 kg/m      Weight:   222 lbs 0 oz    Patient is alert and oriented x4 in no acute distress. Vital signs were reviewed and are documented in electronic medical record. Neck was supple, no carotid bruits, thyromegaly, JVD, or lymphadenopathy was noted.   NEUROLOGY EXAM:   Patient is alert on oriented x3 no acute distress.  Vital signs were reviewed.  Speech was normal with minimal dysarthria no aphasia.  Cranial nerves II through XII are intact.  Motor strength 5/5.  Reflexes 1+ absent at ankles toes downgoing.  No dysmetria noted on finger-nose testing.  Gait normal Romberg negative     PERTINENT DIAGNOSTIC STUDIES     Following studies were reviewed:     CT & CTA HEAD & NECK  HEAD CTA:   1.  Left proximal to mid M2 segments superior division and mid division are occluded with reconstitution. Left MCA collaterals good.  2.  Left ICA at the skull base demonstrates long segment moderate stenosis described above. Left ICA at the skull base demonstrates mildly diminished enhancement suggesting sluggish flow.  3.  Otherwise, no significant " stenosis/occlusion.     NECK CTA:  1.  Left proximal carotid bulb greater than 90% stenosis. Left mid to distal carotid bulb occlusion or near occlusion with reconstitution. Left cervical ICA is significantly diminutive with long segment moderate to severe stenosis. (NASCET criteria   cannot be calculated). Left cervical ICA long segment moderate to severe stenosis may be secondary to the proximal occlusion or near occlusion versus superimposed nonocclusive dissection.  2.  Atherosclerotic plaque results in less than 50% stenosis in the right bulb.  3.  Bilateral extradural vertebral arteries demonstrate no significant stenosis/occlusion or dissection    MRI BRAIN 10/7/2021  1.  Multifocal small late acute to early subacute infarcts in the left MCA territory involving the left insula and subinsular white matter, the left frontal lobe white matter, and the left centrum semiovale.  2.  Mild generalized parenchymal volume loss and sequela of chronic microvascular ischemic disease.    CAROTID ULTRASOUND 10/7/2021  1. 50-69% diameter stenosis of the right ICA relative to the distal  ICA diameter, by ultrasound velocity criteria.  2. Near occlusion of the left carotid bulb/proximal internal carotid  artery by ultrasound velocity criteria.    ECHOCARDIOGRAM 10/7/2021  This TTE echo is nearly unreadable due to lung interference. No obvious clot  seen but a KESHA would be needed to exclude a cardiac source of embolism  LV not well seen, grossly normal but limited views     PERTINENT LABS  Following labs were reviewed:  Transferred Records on 12/30/2021   Component Date Value     Hemoglobin A1C (External) 12/30/2021 6.9*   Lab Requisition on 12/30/2021   Component Date Value     Cholesterol 12/30/2021 119      Triglycerides 12/30/2021 123      Direct Measure HDL 12/30/2021 35*     LDL Cholesterol Calculat* 12/30/2021 59    Lab on 12/06/2021   Component Date Value     SARS CoV2 PCR 12/06/2021 Negative         Total time spent  for face to face visit, reviewing labs/imaging studies, counseling and coordination of care was: 1 Hour spent on the date of the encounter doing chart review, review of outside records, review of test results, interpretation of tests, patient visit and documentation       This note was dictated using voice recognition software.  Any grammatical or context distortions are unintentional and inherent to the software.    No orders of the defined types were placed in this encounter.     New Prescriptions    No medications on file      Modified Medications    No medications on file

## 2022-01-19 NOTE — NURSING NOTE
Chief Complaint   Patient presents with     Stroke     Hospital follow up      Erika Burks CMA on 1/19/2022 at 1:27 PM

## 2022-01-19 NOTE — PATIENT INSTRUCTIONS
"  Patient Education   Taking Aspirin Every Day  The basics  Taking aspirin every day can lower your risk of heart attack and stroke. Ask your doctor about taking aspirin if you:    Are a man age 45 or older    Are a woman age 55 or older    Smoke    Have high blood pressure, high cholesterol or diabetes    Have a family history of heart disease    Have already had a heart attack or stroke  For most people, aspirin is safe. But it's not right for everyone. Talk to your doctor before you start taking aspirin every day.  The benefits  Aspirin can reduce your risk of heart attack or stroke. It can:    Improve the flow of blood to the heart and brain    Help keep your arteries open if you have had a stroke or angioplasty  If you have already had a heart attack or stroke, daily aspirin can lower your risk of having another one.  Take action!  Your doctor can help you decide if aspirin is the right choice for you. Talk to your doctor about:    Your risk of heart attack    What kind of aspirin to take    How much to take    How often to take it  Be sure to tell your doctor about all the other medicines that you take, including vitamins.   For informational purposes only. Not to replace the advice of your health care provider. From \"Talk with Your Doctor about Taking Aspirin Every Day,\" by the U.S. Dept. of Health and Human Services (www.healthfinder.gov). [employer] 111391 - Rev 03/16.       "

## 2022-01-19 NOTE — LETTER
2022         RE: Reggie Roper  [address]  [facility] [address]        Dear Colleague,    Thank you for referring your patient, Reggie Roper, to the [facility]. Please see a copy of my visit note below.    NEUROLOGY CONSULTATION NOTE       [facility]  [address] [place] [address]  Tel: [phone]  Fax: [phone]  [email or web address]     Reggie Roper,  1955, MRN [id number]  PCP: Simin Sanderson  Date: 2022     ASSESSMENT & PLAN     Visit Diagnosis  1. History of left MCA infarction     H/O left MCA infarction  66-year-old male with history of HTN, HLD, DM, CAD, paroxysmal atrial fibrillation who was admitted to [facility] with a left MCA infarct and was noted to have critical left ICA stenosis.  He subsequently underwent carotid endarterectomy and although initially had were called cord paralysis his speech has improved.  He has no deficit at present and I have recommended:    1.  Continue Xarelto and baby aspirin indefinitely  2.  Continue Lipitor 80 mg daily.  His most recent LDL checked in 2021 was 56  3.  He is scheduled to have a follow-up carotid ultrasound in March that was ordered by vascular surgery  4. Vascular risk factors modification: Healthy diet (fruits, vegetables, low fat dairy & reduced saturated fat), weight loss, exercise at least 30 minutes 5 days/week, BMI goal <25.  Keep systolic blood pressure goal <130.  LDL goal <70.  Hemoglobin A1c goal <7. If applicable, STOP smoking  5.  He has made almost complete recovery from a his CVA and no further intervention is needed from neurology standpoint.  Follow-up will be in as needed basis    Thank you again for this referral, please feel free to contact me if you have any questions.    Enoch Yung MD  [facility]  (Formerly, [facility]     REASON FOR  CONSULTATION Stroke        HISTORY OF PRESENT ILLNESS     We have been requested by Dr. Sanderson to evaluate Reggie Roper who is a 66 year old  male for CVA    Patient is a 66-year-old male with history of HTN, HLD, DM, CAD, paroxysmal atrial fibrillation on Xarelto who was admitted to [facility] on 10/6/2021 with right hand weakness and disorientation.  Family was having difficulty contacting him and eventually brought him to the emergency room.  He had CT of the head that showed left MCA territory acute infarct and MRI confirmed multifocal small late acute/early subacute infarct in the left MCA territory.  CTA showed proximal mid M2 segment superior division and mid division occlusion with good collaterals.  Left ICA at the skull base showed moderate stenosis.  There was more than 90% left ICA stenosis and underwent endarterectomy.  Echocardiogram did not show any obvious clot.  Since his discharge he had difficulty with his speech due to vocal cord paralysis but gradually his speech has improved.  He went through physical therapy and they did not think he needs to continue as he is doing well.  He has regained pretty much all his strength and speech and continues to work on lifestyle modification.  He takes Xarelto along with baby aspirin and also statin.  Most recent LDL was less than 65.  He struggles with his hemoglobin A1c and most recent hemoglobin A1c was 6.7     PROBLEM LIST   Patient Active Problem List   Diagnosis Code     H/O left MCA distribution infarction Z86.73     Benign essential hypertension I10     Esophageal reflux K21.9     Coronary atherosclerosis I25.10     Heart murmur R01.1     Mixed hyperlipidemia E78.2     Obesity E66.9     PAF (paroxysmal atrial fibrillation) (H) I48.0     Type 2 diabetes mellitus without complication (H) E11.9     S/P carotid endarterectomy Z98.890     Unilateral partial vocal cord paralysis J38.01         PAST MEDICAL & SURGICAL HISTORY     Past Medical History:    Patient  has a past medical history of Abnormal stress test, Chest pain, Diabetes mellitus (H), Hyperlipidemia, Hypertension, Left carotid artery stenosis (1/19/2022), and Shortness of breath on exertion.    Surgical History:  He  has a past surgical history that includes Cataract Extraction; Endarterectomy carotid (Left, 10/9/2021); and Laryngoscopy, Flexible With Injection (N/A, 12/9/2021).     SOCIAL HISTORY     Reviewed, and he  reports that he has quit smoking. His smoking use included cigars and cigars. He has never used smokeless tobacco. He reports current alcohol use. He reports that he does not use drugs.     FAMILY HISTORY     Reviewed, and family history includes CABG (age of onset: 78.00) in his father; Heart Disease in his father.     ALLERGIES     Allergies   Allergen Reactions     Amlodipine      Other reaction(s): Edema, leg swelling at high dose  Leg swelling at high doses       Hydrochlorothiazide      Other reaction(s): hypokalemia, Hypokalemia     Lisinopril Cough     Other reaction(s): cough     Losartan      Other reaction(s): face flushing, Flushing         REVIEW OF SYSTEMS     A 12 point review of system was performed and was negative except as outlined in the history of present illness.     HOME MEDICATIONS     Current Outpatient Rx   Medication Sig Dispense Refill     aspirin (ASA) 81 MG chewable tablet Take 81 mg by mouth daily       atorvastatin (LIPITOR) 80 MG tablet 1 tablet (80 mg) by Oral or NG Tube route daily 30 tablet 0     dapagliflozin (FARXIGA) 10 MG TABS tablet Take 10 mg by mouth daily       metFORMIN (GLUCOPHAGE) 500 MG tablet Take 1,500 mg by mouth daily       metoprolol succinate ER (TOPROL-XL) 100 MG 24 hr tablet Take 100 mg by mouth daily       nitroGLYcerin (NITROSTAT) 0.4 MG sublingual tablet Place 0.4 mg under the tongue every 5 minutes as needed for chest pain For chest pain place 1 tablet under the tongue every 5 minutes for 3 doses. If symptoms persist 5 minutes  "after 1st dose call 911.       omeprazole (PRILOSEC) 20 MG capsule [OMEPRAZOLE (PRILOSEC) 20 MG CAPSULE] Take 20 mg by mouth daily.       rivaroxaban ANTICOAGULANT (XARELTO) 20 MG TABS tablet Take 1 tablet (20 mg) by mouth daily (with dinner) 90 tablet 1     sitagliptin (JANUVIA) 100 MG tablet Take 100 mg by mouth daily       TEKTURNA 150 mg tablet [TEKTURNA 150 MG TABLET] TAKE 1 TABLET BY MOUTH ONCE DAILY FOR BLOOD PRESSURE 90 tablet 3     acetaminophen (TYLENOL) 325 MG tablet Take 2 tablets (650 mg) by mouth every 4 hours as needed for other (For optimal non-opioid multimodal pain management to improve pain control.)       amoxicillin (AMOXIL) 500 MG tablet Take 4 tabs (to equal 2,000 mg) 1 hour prior to dental procedure. 4 tablet 5     blood glucose (RELION GLUCOSE TEST STRIPS) test strip by In Vitro route daily Use to test blood sugar 1 times daily or as directed.       Glucose Blood (RELION BLOOD GLUCOSE TEST VI) 1 each by In Vitro route daily           PHYSICAL EXAM     Vital signs  /74 (BP Location: Left arm, Patient Position: Sitting)   Pulse 51   Ht 1.778 m (5' 10\")   Wt 100.7 kg (222 lb)   BMI 31.85 kg/m      Weight:   222 lbs 0 oz    Patient is alert and oriented x4 in no acute distress. Vital signs were reviewed and are documented in electronic medical record. Neck was supple, no carotid bruits, thyromegaly, JVD, or lymphadenopathy was noted.   NEUROLOGY EXAM:   Patient is alert on oriented x3 no acute distress.  Vital signs were reviewed.  Speech was normal with minimal dysarthria no aphasia.  Cranial nerves II through XII are intact.  Motor strength 5/5.  Reflexes 1+ absent at ankles toes downgoing.  No dysmetria noted on finger-nose testing.  Gait normal Romberg negative     PERTINENT DIAGNOSTIC STUDIES     Following studies were reviewed:     CT & CTA HEAD & NECK  HEAD CTA:   1.  Left proximal to mid M2 segments superior division and mid division are occluded with reconstitution. Left MCA " collaterals good.  2.  Left ICA at the skull base demonstrates long segment moderate stenosis described above. Left ICA at the skull base demonstrates mildly diminished enhancement suggesting sluggish flow.  3.  Otherwise, no significant stenosis/occlusion.     NECK CTA:  1.  Left proximal carotid bulb greater than 90% stenosis. Left mid to distal carotid bulb occlusion or near occlusion with reconstitution. Left cervical ICA is significantly diminutive with long segment moderate to severe stenosis. (NASCET criteria   cannot be calculated). Left cervical ICA long segment moderate to severe stenosis may be secondary to the proximal occlusion or near occlusion versus superimposed nonocclusive dissection.  2.  Atherosclerotic plaque results in less than 50% stenosis in the right bulb.  3.  Bilateral extradural vertebral arteries demonstrate no significant stenosis/occlusion or dissection    MRI BRAIN 10/7/2021  1.  Multifocal small late acute to early subacute infarcts in the left MCA territory involving the left insula and subinsular white matter, the left frontal lobe white matter, and the left centrum semiovale.  2.  Mild generalized parenchymal volume loss and sequela of chronic microvascular ischemic disease.    CAROTID ULTRASOUND 10/7/2021  1. 50-69% diameter stenosis of the right ICA relative to the distal  ICA diameter, by ultrasound velocity criteria.  2. Near occlusion of the left carotid bulb/proximal internal carotid  artery by ultrasound velocity criteria.    ECHOCARDIOGRAM 10/7/2021  This TTE echo is nearly unreadable due to lung interference. No obvious clot  seen but a KESHA would be needed to exclude a cardiac source of embolism  LV not well seen, grossly normal but limited views     PERTINENT LABS  Following labs were reviewed:  Transferred Records on 12/30/2021   Component Date Value     Hemoglobin A1C (External) 12/30/2021 6.9*   Lab Requisition on 12/30/2021   Component Date Value     Cholesterol  12/30/2021 119      Triglycerides 12/30/2021 123      Direct Measure HDL 12/30/2021 35*     LDL Cholesterol Calculat* 12/30/2021 59    Lab on 12/06/2021   Component Date Value     SARS CoV2 PCR 12/06/2021 Negative         Total time spent for face to face visit, reviewing labs/imaging studies, counseling and coordination of care was: 1 Hour spent on the date of the encounter doing chart review, review of outside records, review of test results, interpretation of tests, patient visit and documentation       This note was dictated using voice recognition software.  Any grammatical or context distortions are unintentional and inherent to the software.    No orders of the defined types were placed in this encounter.     New Prescriptions    No medications on file      Modified Medications    No medications on file              Again, thank you for allowing me to participate in the care of your patient.        Sincerely,        Enoch Yung MD

## 2022-01-20 ENCOUNTER — TELEPHONE (OUTPATIENT)
Dept: OTHER | Facility: CLINIC | Age: 67
End: 2022-01-20
Payer: COMMERCIAL

## 2022-02-01 ENCOUNTER — OFFICE VISIT (OUTPATIENT)
Dept: CARDIOLOGY | Facility: CLINIC | Age: 67
End: 2022-02-01
Attending: INTERNAL MEDICINE
Payer: COMMERCIAL

## 2022-02-01 VITALS
BODY MASS INDEX: 31.71 KG/M2 | DIASTOLIC BLOOD PRESSURE: 74 MMHG | SYSTOLIC BLOOD PRESSURE: 124 MMHG | RESPIRATION RATE: 16 BRPM | WEIGHT: 221 LBS | HEART RATE: 54 BPM | OXYGEN SATURATION: 99 %

## 2022-02-01 DIAGNOSIS — I25.10 ATHEROSCLEROSIS OF NATIVE CORONARY ARTERY OF NATIVE HEART WITHOUT ANGINA PECTORIS: ICD-10-CM

## 2022-02-01 DIAGNOSIS — Z98.890 S/P CAROTID ENDARTERECTOMY: ICD-10-CM

## 2022-02-01 DIAGNOSIS — I63.9 ACUTE EMBOLIC STROKE (H): ICD-10-CM

## 2022-02-01 DIAGNOSIS — I10 BENIGN ESSENTIAL HYPERTENSION: ICD-10-CM

## 2022-02-01 DIAGNOSIS — I48.0 PAF (PAROXYSMAL ATRIAL FIBRILLATION) (H): ICD-10-CM

## 2022-02-01 PROCEDURE — 99214 OFFICE O/P EST MOD 30 MIN: CPT | Performed by: INTERNAL MEDICINE

## 2022-02-01 NOTE — LETTER
2/1/2022    Simin Sanderson MD  [facility] [address] [facility] [address]    RE: Reggie Roper       Dear Colleague,     I had the pleasure of seeing Reggie Roper in the [facility].      [facility]  [phone]          Assessment/Recommendations   Patient with known mild coronary artery disease, hypertension, type 2 diabetes as well as recent neurological event, left carotid endarterectomy.  He has been feeling well.  He has not felt any atrial fibrillation although he has been asymptomatic with this in the past.  He is on appropriate anticoagulation in the form of Xarelto and also takes a baby aspirin each day.  He follows with a vascular surgeon.  He is on a good dose of beta-blocker and blood pressure is well controlled and LDL is well below 70.    I have encouraged him to maintain an active lifestyle and to walk and or bicycle at least 30 minutes and at least 5 times each week and he will take that under advisement.    I will see him in the late fall of this year and follow him on a annual basis thereafter.    Thank you for allowing us to participate in his care.       History of Present Illness/Subjective    Mr. Reggie Roper is a 67 year old male with known paroxysmal atrial fibrillation, hypertension, type 2 diabetes, and had a cerebrovascular accident followed by carotid endarterectomy at [facility].  He has been doing well.  He has been walking without difficulty and denies any unusual shortness of breath or chest discomfort.  Minimal peripheral edema.  No syncopal or near syncopal episodes and has not had any palpitations.  LDL cholesterol is well treated as noted below and blood pressure is at goal.       Physical Examination Review of Systems   /74 (BP Location: Left arm, Patient Position: Sitting, Cuff Size: Adult Regular)   Pulse 54   Resp 16   Wt 100.2 kg (221 lb)   SpO2 99%   BMI 31.71 kg/m    Body mass index is 31.71  kg/m .  Wt Readings from Last 3 Encounters:   02/01/22 100.2 kg (221 lb)   01/19/22 100.7 kg (222 lb)   01/10/22 100.7 kg (222 lb)     General Appearance:   Alert, cooperative and in no acute distress.   ENT/Mouth: Patient wearing a mask.      EYES:  no scleral icterus, normal conjunctivae   Neck: JVP normal. No Hepatojugular reflux. Thyroid not visualized.   Chest/Lungs:   Lungs are clear to auscultation, equal chest wall expansion.   Cardiovascular:   S1, S2 without murmur ,clicks or rubs. Brachial, radial and posterior tibial pulses are intact and symetric. No carotid bruits noted   Abdomen:  Nontender. BS+. No bruits.   Extremities: No cyanosis, clubbing and very mild pretibial edema   Skin: no xanthelasma, warm.    Neurologic: normal arm movement bilateral, no tremors     Psychiatric: Appropriate affect.      Enc Vitals  BP: 124/74  Pulse: 54  Resp: 16  SpO2: 99 %  Weight: 100.2 kg (221 lb)                                           Medical History  Surgical History Family History Social History   Past Medical History:   Diagnosis Date     Abnormal stress test      Chest pain     Midsternal Pain     Diabetes mellitus (H)      Hyperlipidemia      Hypertension      Left carotid artery stenosis 1/19/2022     Shortness of breath on exertion     Past Surgical History:   Procedure Laterality Date     CATARACT EXTRACTION       ENDARTERECTOMY CAROTID Left 10/9/2021    Procedure: LEFT CAROTID ENDARTERECTOMY;  Surgeon: Jaycob Mayo MD;  Location:  OR     LARYNGOSCOPY, FLEXIBLE WITH INJECTION N/A 12/9/2021    Procedure: LARYNGOSCOPY, FLEXIBLE WITH INJECTION;  Surgeon: Ophelia Ngo MD;  Location: [facility] OR    Family History   Problem Relation Age of Onset     Heart Disease Father      CABG Father 78.00    Social History     Socioeconomic History     Marital status:      Spouse name: Not on file     Number of children: Not on file     Years of education: Not on file     Highest education level:  Not on file   Occupational History     Not on file   Tobacco Use     Smoking status: Former Smoker     Types: Cigars, Cigars     Smokeless tobacco: Never Used   Substance and Sexual Activity     Alcohol use: Yes     Comment: Alcoholic Drinks/day: occasional     Drug use: No     Sexual activity: Not on file   Other Topics Concern     Not on file   Social History Narrative     Not on file     Social Determinants of Health     Financial Resource Strain: Not on file   Food Insecurity: Not on file   Transportation Needs: Not on file   Physical Activity: Not on file   Stress: Not on file   Social Connections: Not on file   Intimate Partner Violence: Not on file   Housing Stability: Not on file          Medications  Allergies   Current Outpatient Medications   Medication Sig Dispense Refill     acetaminophen (TYLENOL) 325 MG tablet Take 2 tablets (650 mg) by mouth every 4 hours as needed for other (For optimal non-opioid multimodal pain management to improve pain control.)       amoxicillin (AMOXIL) 500 MG tablet Take 4 tabs (to equal 2,000 mg) 1 hour prior to dental procedure. 4 tablet 5     aspirin (ASA) 81 MG chewable tablet Take 81 mg by mouth daily       atorvastatin (LIPITOR) 80 MG tablet 1 tablet (80 mg) by Oral or NG Tube route daily 30 tablet 0     blood glucose (RELION GLUCOSE TEST STRIPS) test strip by In Vitro route daily Use to test blood sugar 1 times daily or as directed.       dapagliflozin (FARXIGA) 10 MG TABS tablet Take 10 mg by mouth daily       Glucose Blood (RELION BLOOD GLUCOSE TEST VI) 1 each by In Vitro route daily       metFORMIN (GLUCOPHAGE) 500 MG tablet Take 1,500 mg by mouth daily       metoprolol succinate ER (TOPROL-XL) 100 MG 24 hr tablet Take 100 mg by mouth daily       nitroGLYcerin (NITROSTAT) 0.4 MG sublingual tablet Place 0.4 mg under the tongue every 5 minutes as needed for chest pain For chest pain place 1 tablet under the tongue every 5 minutes for 3 doses. If symptoms persist 5  minutes after 1st dose call 911.       omeprazole (PRILOSEC) 20 MG capsule [OMEPRAZOLE (PRILOSEC) 20 MG CAPSULE] Take 20 mg by mouth daily.       rivaroxaban ANTICOAGULANT (XARELTO) 20 MG TABS tablet Take 1 tablet (20 mg) by mouth daily (with dinner) 90 tablet 1     sitagliptin (JANUVIA) 100 MG tablet Take 100 mg by mouth daily       TEKTURNA 150 mg tablet [TEKTURNA 150 MG TABLET] TAKE 1 TABLET BY MOUTH ONCE DAILY FOR BLOOD PRESSURE 90 tablet 3    Allergies   Allergen Reactions     Amlodipine      Other reaction(s): Edema, leg swelling at high dose  Leg swelling at high doses       Hydrochlorothiazide      Other reaction(s): hypokalemia, Hypokalemia     Lisinopril Cough     Other reaction(s): cough     Losartan      Other reaction(s): face flushing, Flushing         Lab Results    Chemistry/lipid CBC Cardiac Enzymes/BNP/TSH/INR   Lab Results   Component Value Date    CHOL 119 12/30/2021    HDL 35 (L) 12/30/2021    TRIG 123 12/30/2021    BUN 19 10/14/2021     10/14/2021    CO2 26 10/14/2021    Lab Results   Component Value Date    WBC 5.9 10/09/2021    HGB 14.8 10/09/2021    HCT 44.2 10/09/2021    MCV 94 10/09/2021     (L) 10/09/2021    Lab Results   Component Value Date    TSH 1.79 10/07/2021                Thank you for allowing me to participate in the care of your patient.      Sincerely,     Nino Salazar MD     [facility]  cc:   Nino Salazar MD  [address] [place] [address]

## 2022-02-01 NOTE — PROGRESS NOTES
[facility]  [phone]          Assessment/Recommendations   Patient with known mild coronary artery disease, hypertension, type 2 diabetes as well as recent neurological event, left carotid endarterectomy.  He has been feeling well.  He has not felt any atrial fibrillation although he has been asymptomatic with this in the past.  He is on appropriate anticoagulation in the form of Xarelto and also takes a baby aspirin each day.  He follows with a vascular surgeon.  He is on a good dose of beta-blocker and blood pressure is well controlled and LDL is well below 70.    I have encouraged him to maintain an active lifestyle and to walk and or bicycle at least 30 minutes and at least 5 times each week and he will take that under advisement.    I will see him in the late fall of this year and follow him on a annual basis thereafter.    Thank you for allowing us to participate in his care.       History of Present Illness/Subjective    Mr. Reggie Roper is a 67 year old male with known paroxysmal atrial fibrillation, hypertension, type 2 diabetes, and had a cerebrovascular accident followed by carotid endarterectomy at [facility].  He has been doing well.  He has been walking without difficulty and denies any unusual shortness of breath or chest discomfort.  Minimal peripheral edema.  No syncopal or near syncopal episodes and has not had any palpitations.  LDL cholesterol is well treated as noted below and blood pressure is at goal.       Physical Examination Review of Systems   /74 (BP Location: Left arm, Patient Position: Sitting, Cuff Size: Adult Regular)   Pulse 54   Resp 16   Wt 100.2 kg (221 lb)   SpO2 99%   BMI 31.71 kg/m    Body mass index is 31.71 kg/m .  Wt Readings from Last 3 Encounters:   02/01/22 100.2 kg (221 lb)   01/19/22 100.7 kg (222 lb)   01/10/22 100.7 kg (222 lb)     General Appearance:   Alert, cooperative and in no acute distress.   ENT/Mouth: Patient  wearing a mask.      EYES:  no scleral icterus, normal conjunctivae   Neck: JVP normal. No Hepatojugular reflux. Thyroid not visualized.   Chest/Lungs:   Lungs are clear to auscultation, equal chest wall expansion.   Cardiovascular:   S1, S2 without murmur ,clicks or rubs. Brachial, radial and posterior tibial pulses are intact and symetric. No carotid bruits noted   Abdomen:  Nontender. BS+. No bruits.   Extremities: No cyanosis, clubbing and very mild pretibial edema   Skin: no xanthelasma, warm.    Neurologic: normal arm movement bilateral, no tremors     Psychiatric: Appropriate affect.      Enc Vitals  BP: 124/74  Pulse: 54  Resp: 16  SpO2: 99 %  Weight: 100.2 kg (221 lb)                                           Medical History  Surgical History Family History Social History   Past Medical History:   Diagnosis Date     Abnormal stress test      Chest pain     Midsternal Pain     Diabetes mellitus (H)      Hyperlipidemia      Hypertension      Left carotid artery stenosis 1/19/2022     Shortness of breath on exertion     Past Surgical History:   Procedure Laterality Date     CATARACT EXTRACTION       ENDARTERECTOMY CAROTID Left 10/9/2021    Procedure: LEFT CAROTID ENDARTERECTOMY;  Surgeon: Jaycob Mayo MD;  Location:  OR     LARYNGOSCOPY, FLEXIBLE WITH INJECTION N/A 12/9/2021    Procedure: LARYNGOSCOPY, FLEXIBLE WITH INJECTION;  Surgeon: Ophelia Ngo MD;  Location: [facility] OR    Family History   Problem Relation Age of Onset     Heart Disease Father      CABG Father 78.00    Social History     Socioeconomic History     Marital status:      Spouse name: Not on file     Number of children: Not on file     Years of education: Not on file     Highest education level: Not on file   Occupational History     Not on file   Tobacco Use     Smoking status: Former Smoker     Types: Cigars, Cigars     Smokeless tobacco: Never Used   Substance and Sexual Activity     Alcohol use: Yes      Comment: Alcoholic Drinks/day: occasional     Drug use: No     Sexual activity: Not on file   Other Topics Concern     Not on file   Social History Narrative     Not on file     Social Determinants of Health     Financial Resource Strain: Not on file   Food Insecurity: Not on file   Transportation Needs: Not on file   Physical Activity: Not on file   Stress: Not on file   Social Connections: Not on file   Intimate Partner Violence: Not on file   Housing Stability: Not on file          Medications  Allergies   Current Outpatient Medications   Medication Sig Dispense Refill     acetaminophen (TYLENOL) 325 MG tablet Take 2 tablets (650 mg) by mouth every 4 hours as needed for other (For optimal non-opioid multimodal pain management to improve pain control.)       amoxicillin (AMOXIL) 500 MG tablet Take 4 tabs (to equal 2,000 mg) 1 hour prior to dental procedure. 4 tablet 5     aspirin (ASA) 81 MG chewable tablet Take 81 mg by mouth daily       atorvastatin (LIPITOR) 80 MG tablet 1 tablet (80 mg) by Oral or NG Tube route daily 30 tablet 0     blood glucose (RELION GLUCOSE TEST STRIPS) test strip by In Vitro route daily Use to test blood sugar 1 times daily or as directed.       dapagliflozin (FARXIGA) 10 MG TABS tablet Take 10 mg by mouth daily       Glucose Blood (RELION BLOOD GLUCOSE TEST VI) 1 each by In Vitro route daily       metFORMIN (GLUCOPHAGE) 500 MG tablet Take 1,500 mg by mouth daily       metoprolol succinate ER (TOPROL-XL) 100 MG 24 hr tablet Take 100 mg by mouth daily       nitroGLYcerin (NITROSTAT) 0.4 MG sublingual tablet Place 0.4 mg under the tongue every 5 minutes as needed for chest pain For chest pain place 1 tablet under the tongue every 5 minutes for 3 doses. If symptoms persist 5 minutes after 1st dose call 911.       omeprazole (PRILOSEC) 20 MG capsule [OMEPRAZOLE (PRILOSEC) 20 MG CAPSULE] Take 20 mg by mouth daily.       rivaroxaban ANTICOAGULANT (XARELTO) 20 MG TABS tablet Take 1 tablet (20  mg) by mouth daily (with dinner) 90 tablet 1     sitagliptin (JANUVIA) 100 MG tablet Take 100 mg by mouth daily       TEKTURNA 150 mg tablet [TEKTURNA 150 MG TABLET] TAKE 1 TABLET BY MOUTH ONCE DAILY FOR BLOOD PRESSURE 90 tablet 3    Allergies   Allergen Reactions     Amlodipine      Other reaction(s): Edema, leg swelling at high dose  Leg swelling at high doses       Hydrochlorothiazide      Other reaction(s): hypokalemia, Hypokalemia     Lisinopril Cough     Other reaction(s): cough     Losartan      Other reaction(s): face flushing, Flushing         Lab Results    Chemistry/lipid CBC Cardiac Enzymes/BNP/TSH/INR   Lab Results   Component Value Date    CHOL 119 12/30/2021    HDL 35 (L) 12/30/2021    TRIG 123 12/30/2021    BUN 19 10/14/2021     10/14/2021    CO2 26 10/14/2021    Lab Results   Component Value Date    WBC 5.9 10/09/2021    HGB 14.8 10/09/2021    HCT 44.2 10/09/2021    MCV 94 10/09/2021     (L) 10/09/2021    Lab Results   Component Value Date    TSH 1.79 10/07/2021

## 2022-03-01 ENCOUNTER — TRANSFERRED RECORDS (OUTPATIENT)
Dept: HEALTH INFORMATION MANAGEMENT | Facility: CLINIC | Age: 67
End: 2022-03-01

## 2022-03-14 ENCOUNTER — HOSPITAL ENCOUNTER (OUTPATIENT)
Dept: ULTRASOUND IMAGING | Facility: CLINIC | Age: 67
Discharge: HOME | End: 2022-03-14
Attending: SURGERY
Payer: COMMERCIAL

## 2022-03-14 ENCOUNTER — OFFICE VISIT (OUTPATIENT)
Dept: OTHER | Facility: CLINIC | Age: 67
End: 2022-03-14
Attending: SURGERY
Payer: COMMERCIAL

## 2022-03-14 VITALS — OXYGEN SATURATION: 96 % | HEART RATE: 64 BPM | DIASTOLIC BLOOD PRESSURE: 70 MMHG | SYSTOLIC BLOOD PRESSURE: 102 MMHG

## 2022-03-14 DIAGNOSIS — I65.22 CAROTID ARTERY STENOSIS, SYMPTOMATIC, LEFT: ICD-10-CM

## 2022-03-14 DIAGNOSIS — I65.22 CAROTID ARTERY STENOSIS, SYMPTOMATIC, LEFT: Primary | ICD-10-CM

## 2022-03-14 PROCEDURE — 93882 EXTRACRANIAL UNI/LTD STUDY: CPT | Mod: LT

## 2022-03-14 PROCEDURE — 99213 OFFICE O/P EST LOW 20 MIN: CPT | Performed by: SURGERY

## 2022-03-14 PROCEDURE — G0463 HOSPITAL OUTPT CLINIC VISIT: HCPCS | Mod: 25

## 2022-03-14 PROCEDURE — 93882 EXTRACRANIAL UNI/LTD STUDY: CPT | Mod: 26 | Performed by: SURGERY

## 2022-03-14 NOTE — PROGRESS NOTES
Patient is here to discuss history of left carotid endarterectomy 9/2021 ; 3 month follow up to 12/13/21 appointment with [facility]; Left Carotid US prior to office visit on 3/10/22 *gbn    /70 (BP Location: Left arm)   Pulse 64   SpO2 96%     Questions patient would like addressed today are: N/A.    Refills are needed: No    Has homecare services and agency name:  Parul Hanley

## 2022-03-14 NOTE — PROGRESS NOTES
Mr. Roper returns to clinic today.  He is a very pleasant 67-year-old gentleman who had developed symptomatic left carotid stenosis.  In October 2021 he underwent a left carotid endarterectomy.    He has bought a place in [place] last year and was actually planning on going there but could not because of his surgery and convalescence.  He is actually now heading in that direction.    His left neck incision is well-healed.    Imaging shows widely patent left carotid endarterectomy site.    I wished him well on his journey and back from [place].  I will see him back in a year with bilateral carotid duplex sonography for surveillance.

## 2022-03-21 DIAGNOSIS — I65.22 CAROTID ARTERY STENOSIS, SYMPTOMATIC, LEFT: Primary | ICD-10-CM

## 2022-04-22 NOTE — TELEPHONE ENCOUNTER
[facility]    Who is the name of the provider?:  Gael      What is the location you see this provider at?: [place]    Reason for call:  Please call re pharmacy information and blood thinner question.      Can we leave a detailed message on this number?  YES        [Today's Date:] : Date: [unfilled] [FreeTextEntry1] : Pt feels well today, no recent infections, fevers/chills. Prolia 60mg administered subcutaneously into R upper arm after site cleansed with alcohol. Pt tolerated well, no leakage of medication from injection site, dressed with Band-Aid. \par \par Pt educated that site may be sore post-injection, she could experience mild malaise. Advised to call office if develops any pain, swelling, redness at site, new fevers or chills.

## 2022-05-09 ENCOUNTER — OFFICE VISIT (OUTPATIENT)
Dept: OTOLARYNGOLOGY | Facility: CLINIC | Age: 67
End: 2022-05-09
Payer: COMMERCIAL

## 2022-05-09 VITALS
BODY MASS INDEX: 31.21 KG/M2 | OXYGEN SATURATION: 97 % | HEIGHT: 70 IN | WEIGHT: 218 LBS | TEMPERATURE: 97.9 F | HEART RATE: 60 BPM

## 2022-05-09 DIAGNOSIS — Z98.890 S/P CAROTID ENDARTERECTOMY: ICD-10-CM

## 2022-05-09 DIAGNOSIS — R49.0 DYSPHONIA: Primary | ICD-10-CM

## 2022-05-09 DIAGNOSIS — J38.01 UNILATERAL PARTIAL VOCAL CORD PARALYSIS: ICD-10-CM

## 2022-05-09 PROCEDURE — 99212 OFFICE O/P EST SF 10 MIN: CPT | Mod: 25 | Performed by: OTOLARYNGOLOGY

## 2022-05-09 PROCEDURE — 31579 LARYNGOSCOPY TELESCOPIC: CPT | Performed by: OTOLARYNGOLOGY

## 2022-05-09 ASSESSMENT — PAIN SCALES - GENERAL: PAINLEVEL: NO PAIN (0)

## 2022-05-09 NOTE — LETTER
5/9/2022      RE: Reggie Roper  [address]  [place] [facility] [place] [address]       Dear Colleague:    Reggie Roper recently returned for follow-up at the [facility]. My clinic note from our visit is enclosed below.  Speech recognition software may have been used in the documentation below; input is reviewed before signature to the best of my ability.     I appreciate the ongoing opportunity to participate in this patient's care.    Please feel free to contact me with any questions.    Sincerely yours,      Ophelia Ngo M.D., M.P.H.  , Laryngology  Director, [facility]  Otolaryngology- Head & Neck Surgery  [phone]            =====  HISTORY OF PRESENT ILLNESS:  Reggie Roper is a pleasant 67-year-old male with history of carotid endarterectomy in October 2021 and left true vocal fold motion impairment who returns in follow up today.    He underwent flexible laryngoscopy with Prolaryn Gel injection to the left true vocal fold on 12/9/2021.    His voice has been essentially stable since he was seen last. Sometimes, he has trouble being heard, but is able to communicate most of the time.  No swallowing concerns.  He is not having things get down the wrong tube.    VHI-10 total today = 8      MEDICATIONS:     Current Outpatient Medications   Medication Sig Dispense Refill     aspirin (ASA) 81 MG chewable tablet Take 81 mg by mouth daily       atorvastatin (LIPITOR) 80 MG tablet 1 tablet (80 mg) by Oral or NG Tube route daily 30 tablet 0     blood glucose (RELION GLUCOSE TEST STRIPS) test strip by In Vitro route daily Use to test blood sugar 1 times daily or as directed.       dapagliflozin (FARXIGA) 10 MG TABS tablet Take 10 mg by mouth daily       Glucose Blood (RELION BLOOD GLUCOSE TEST VI) 1 each by In Vitro route daily       metFORMIN (GLUCOPHAGE) 500 MG tablet Take 1,500 mg by mouth daily       metoprolol succinate ER (TOPROL-XL) 100 MG 24 hr tablet  Take 100 mg by mouth daily       omeprazole (PRILOSEC) 20 MG capsule [OMEPRAZOLE (PRILOSEC) 20 MG CAPSULE] Take 20 mg by mouth daily.       rivaroxaban ANTICOAGULANT (XARELTO) 20 MG TABS tablet Take 1 tablet (20 mg) by mouth daily (with dinner) 90 tablet 1     sitagliptin (JANUVIA) 100 MG tablet Take 100 mg by mouth daily       TEKTURNA 150 mg tablet [TEKTURNA 150 MG TABLET] TAKE 1 TABLET BY MOUTH ONCE DAILY FOR BLOOD PRESSURE 90 tablet 3     acetaminophen (TYLENOL) 325 MG tablet Take 2 tablets (650 mg) by mouth every 4 hours as needed for other (For optimal non-opioid multimodal pain management to improve pain control.) (Patient not taking: No sig reported)       amoxicillin (AMOXIL) 500 MG tablet Take 4 tabs (to equal 2,000 mg) 1 hour prior to dental procedure. (Patient not taking: No sig reported) 4 tablet 5     nitroGLYcerin (NITROSTAT) 0.4 MG sublingual tablet Place 0.4 mg under the tongue every 5 minutes as needed for chest pain For chest pain place 1 tablet under the tongue every 5 minutes for 3 doses. If symptoms persist 5 minutes after 1st dose call 911. (Patient not taking: No sig reported)         ALLERGIES:    Allergies   Allergen Reactions     Amlodipine      Other reaction(s): Edema, leg swelling at high dose  Leg swelling at high doses       Hydrochlorothiazide      Other reaction(s): hypokalemia, Hypokalemia     Lisinopril Cough     Other reaction(s): cough     Losartan      Other reaction(s): face flushing, Flushing       NEW PMH/PSH: None    REVIEW OF SYSTEMS:  The patient completed a comprehensive 11 point review of systems (below), which was reviewed. Positives are as noted below.  Patient Supplied Answers to Review of Systems            PHYSICAL EXAM:  General: The patient was alert and conversant, and in no acute distress.    Neck: No palpable cervical lymphadenopathy, no significant tenderness to palpation of the thyrohyoid space, which was narrow. No obvious thyroid abnormality.  Well-healed  left neck incision.  Resp: Breathing comfortably, no stridor or stertor.  Neuro: Symmetric facial function. Other cranial nerve function as documented above.  Psych: Normal affect, pleasant and cooperative.  Voice/speech: Mild dysphonia characterized by breathiness, roughness and strain.      Intake scores  Last 2 Scores for Patient-Answered VHI Questionnaire  No flowsheet data found.   Last 2 Scores for Patient-Answered EAT Questionnaire  No flowsheet data found.   Last 2 Scores for Patient-Answered CSI Questionnaire  No flowsheet data found.        Procedure:   Flexible fiberoptic laryngoscopy and laryngovideostroboscopy  Indications: This procedure was warranted to evaluate the patient's laryngeal anatomy and function. Risks, benefits, and alternatives were discussed.  Description: After written informed consent was obtained, a time-out was performed to confirm patient identity, procedure, and procedure site. Topical 3% lidocaine with 0.25% phenylephrine was applied to the nasal cavities. I performed the endoscopy and no complications were apparent. Continuous and stroboscopic light were utilized to assess routine phonation and variable frequency phonation.  Performed by: Ophelia Ngo MD MPH  Findings: Normal nasopharynx. Normal base of tongue, valleculae, and epiglottis. Vocal fold mobility: right: normal; left: impaired. Medial edges of the vocal folds: smooth and straight on the right, slightly bowed on the left. No focal mucosal lesions were observed on the true vocal folds. Glissade produced appropriate elongation. There was moderate supraglottic recruitment with connected speech. Mucosa of false vocal folds, aryepiglottic folds, piriform sinuses, and posterior glottis unremarkable. Airway was patent.  No focal lesions on NBI.    The addition of stroboscopy allowed evaluation of the mucosal wave.   Amplitude: right: normal; left: normal. Symmetry: intermittent symmetry. Closure pattern: complete.  Closure plane: at glottic level. Phase distribution: slightly open-phase predominant.                        IMPRESSION AND PLAN:   Reggie Roper returns with possible subtle improvement of left true vocal fold mobility, but with persistent substantial limitation. His vocal function is acceptable, though not perfect. Swallowing is safe.    We discussed that by now, we expect that the previously injected material should be entirely resorbed.  Fortunately, his vocal function has remained improved.  He is not interested in further intervention right now, and also states the procedure was tough so he is glad not to need it right now. We did note that it can also be accomplished under general anesthesia, but if his voice is acceptable right now, the benefit may be limited.    We will plan to recheck mobility at approximately one year from onset, or sooner as needed.  He is planning a trip to [place] in October, so may come in slightly early.  He will also let us know if he feels he needs a repeat injection prior to his trip.    Today's visit required additional screening time, PPE, and cleaning measures to allow for a safe in-person visit, due to the public health emergency. I spent a total of 18 minutes on 5/9/2022 in chart review, review of tests, patient visit, documentation, care coordination, and/or discussion with other providers about the issues documented above, separate from any documented procedure(s).

## 2022-05-09 NOTE — Clinical Note
5/9/2022       RE: Reggie Roper  [address]  [place] [address]     Dear Colleague,    Thank you for referring your patient, Reggie Roper, to the [facility] [place] at [facility]. Please see a copy of my visit note below.    Dear Colleague:    Reggie Roper recently returned for follow-up at the [facility]. My clinic note from our visit is enclosed below.  Speech recognition software may have been used in the documentation below; input is reviewed before signature to the best of my ability.     I appreciate the ongoing opportunity to participate in this patient's care.    Please feel free to contact me with any questions.    Sincerely yours,      Ophelia Ngo M.D., M.P.H.  , Laryngology  Director, [facility]  Otolaryngology- Head & Neck Surgery  [phone]            =====  HISTORY OF PRESENT ILLNESS:  Reggie Roper is a pleasant 67-year-old male with history of carotid endarterectomy in October 2021 and left true vocal fold motion impairment who returns in follow up today.    He underwent flexible laryngoscopy with Prolaryn Gel injection to the left true vocal fold on 12/9/2021.    His voice has been essentially stable since he was seen last. Sometimes, he has trouble being heard, but is able to communicate most of the time.  No swallowing concerns.  He is not having things get down the wrong tube.    VHI-10 total today = 8      MEDICATIONS:     Current Outpatient Medications   Medication Sig Dispense Refill     aspirin (ASA) 81 MG chewable tablet Take 81 mg by mouth daily       atorvastatin (LIPITOR) 80 MG tablet 1 tablet (80 mg) by Oral or NG Tube route daily 30 tablet 0     blood glucose (RELION GLUCOSE TEST STRIPS) test strip by In Vitro route daily Use to test blood sugar 1 times daily or as directed.       dapagliflozin (FARXIGA) 10 MG TABS tablet  Take 10 mg by mouth daily       Glucose Blood (RELION BLOOD GLUCOSE TEST VI) 1 each by In Vitro route daily       metFORMIN (GLUCOPHAGE) 500 MG tablet Take 1,500 mg by mouth daily       metoprolol succinate ER (TOPROL-XL) 100 MG 24 hr tablet Take 100 mg by mouth daily       omeprazole (PRILOSEC) 20 MG capsule [OMEPRAZOLE (PRILOSEC) 20 MG CAPSULE] Take 20 mg by mouth daily.       rivaroxaban ANTICOAGULANT (XARELTO) 20 MG TABS tablet Take 1 tablet (20 mg) by mouth daily (with dinner) 90 tablet 1     sitagliptin (JANUVIA) 100 MG tablet Take 100 mg by mouth daily       TEKTURNA 150 mg tablet [TEKTURNA 150 MG TABLET] TAKE 1 TABLET BY MOUTH ONCE DAILY FOR BLOOD PRESSURE 90 tablet 3     acetaminophen (TYLENOL) 325 MG tablet Take 2 tablets (650 mg) by mouth every 4 hours as needed for other (For optimal non-opioid multimodal pain management to improve pain control.) (Patient not taking: No sig reported)       amoxicillin (AMOXIL) 500 MG tablet Take 4 tabs (to equal 2,000 mg) 1 hour prior to dental procedure. (Patient not taking: No sig reported) 4 tablet 5     nitroGLYcerin (NITROSTAT) 0.4 MG sublingual tablet Place 0.4 mg under the tongue every 5 minutes as needed for chest pain For chest pain place 1 tablet under the tongue every 5 minutes for 3 doses. If symptoms persist 5 minutes after 1st dose call 911. (Patient not taking: No sig reported)         ALLERGIES:    Allergies   Allergen Reactions     Amlodipine      Other reaction(s): Edema, leg swelling at high dose  Leg swelling at high doses       Hydrochlorothiazide      Other reaction(s): hypokalemia, Hypokalemia     Lisinopril Cough     Other reaction(s): cough     Losartan      Other reaction(s): face flushing, Flushing       NEW PMH/PSH: None    REVIEW OF SYSTEMS:  The patient completed a comprehensive 11 point review of systems (below), which was reviewed. Positives are as noted below.  Patient Supplied Answers to Review of Systems            PHYSICAL  EXAM:  General: The patient was alert and conversant, and in no acute distress.    Neck: No palpable cervical lymphadenopathy, no significant tenderness to palpation of the thyrohyoid space, which was narrow. No obvious thyroid abnormality.  Well-healed left neck incision.  Resp: Breathing comfortably, no stridor or stertor.  Neuro: Symmetric facial function. Other cranial nerve function as documented above.  Psych: Normal affect, pleasant and cooperative.  Voice/speech: Mild dysphonia characterized by breathiness, roughness and strain.      Intake scores  Last 2 Scores for Patient-Answered VHI Questionnaire  No flowsheet data found.   Last 2 Scores for Patient-Answered EAT Questionnaire  No flowsheet data found.   Last 2 Scores for Patient-Answered CSI Questionnaire  No flowsheet data found.        Procedure:   Flexible fiberoptic laryngoscopy and laryngovideostroboscopy  Indications: This procedure was warranted to evaluate the patient's laryngeal anatomy and function. Risks, benefits, and alternatives were discussed.  Description: After written informed consent was obtained, a time-out was performed to confirm patient identity, procedure, and procedure site. Topical 3% lidocaine with 0.25% phenylephrine was applied to the nasal cavities. I performed the endoscopy and no complications were apparent. Continuous and stroboscopic light were utilized to assess routine phonation and variable frequency phonation.  Performed by: Ophelia Ngo MD MPH  Findings: Normal nasopharynx. Normal base of tongue, valleculae, and epiglottis. Vocal fold mobility: right: normal; left: impaired. Medial edges of the vocal folds: smooth and straight on the right, slightly bowed on the left. No focal mucosal lesions were observed on the true vocal folds. Glissade produced appropriate elongation. There was moderate supraglottic recruitment with connected speech. Mucosa of false vocal folds, aryepiglottic folds, piriform sinuses, and  posterior glottis unremarkable. Airway was patent.  No focal lesions on NBI.    The addition of stroboscopy allowed evaluation of the mucosal wave.   Amplitude: right: normal; left: normal. Symmetry: intermittent symmetry. Closure pattern: complete. Closure plane: at glottic level. Phase distribution: slightly open-phase predominant.                        IMPRESSION AND PLAN:   Reggie Roper returns with possible subtle improvement of left true vocal fold mobility, but with persistent substantial limitation. His vocal function is acceptable, though not perfect. Swallowing is safe.    We discussed that by now, we expect that the previously injected material should be entirely resorbed.  Fortunately, his vocal function has remained improved.  He is not interested in further intervention right now, and also states the procedure was tough so he is glad not to need it right now. We did note that it can also be accomplished under general anesthesia, but if his voice is acceptable right now, the benefit may be limited.    We will plan to recheck mobility at approximately one year from onset, or sooner as needed.  He is planning a trip to [place] in October, so may come in slightly early.  He will also let us know if he feels he needs a repeat injection prior to his trip.    Today's visit required additional screening time, PPE, and cleaning measures to allow for a safe in-person visit, due to the public health emergency. I spent a total of 18 minutes on 5/9/2022 in chart review, review of tests, patient visit, documentation, care coordination, and/or discussion with other providers about the issues documented above, separate from any documented procedure(s).        Again, thank you for allowing me to participate in the care of your patient.      Sincerely,    Ophelia Ngo MD

## 2022-05-09 NOTE — PROGRESS NOTES
Dear Colleague:    Reggie Roper recently returned for follow-up at the [facility]. My clinic note from our visit is enclosed below.  Speech recognition software may have been used in the documentation below; input is reviewed before signature to the best of my ability.     I appreciate the ongoing opportunity to participate in this patient's care.    Please feel free to contact me with any questions.    Sincerely yours,      Ophelia Ngo M.D., M.P.H.  , Laryngology  Director, [facility]  Otolaryngology- Head & Neck Surgery  [phone]            =====  HISTORY OF PRESENT ILLNESS:  Reggie Roper is a pleasant 67-year-old male with history of carotid endarterectomy in October 2021 and left true vocal fold motion impairment who returns in follow up today.    He underwent flexible laryngoscopy with Prolaryn Gel injection to the left true vocal fold on 12/9/2021.    His voice has been essentially stable since he was seen last. Sometimes, he has trouble being heard, but is able to communicate most of the time.  No swallowing concerns.  He is not having things get down the wrong tube.    VHI-10 total today = 8      MEDICATIONS:     Current Outpatient Medications   Medication Sig Dispense Refill     aspirin (ASA) 81 MG chewable tablet Take 81 mg by mouth daily       atorvastatin (LIPITOR) 80 MG tablet 1 tablet (80 mg) by Oral or NG Tube route daily 30 tablet 0     blood glucose (RELION GLUCOSE TEST STRIPS) test strip by In Vitro route daily Use to test blood sugar 1 times daily or as directed.       dapagliflozin (FARXIGA) 10 MG TABS tablet Take 10 mg by mouth daily       Glucose Blood (RELION BLOOD GLUCOSE TEST VI) 1 each by In Vitro route daily       metFORMIN (GLUCOPHAGE) 500 MG tablet Take 1,500 mg by mouth daily       metoprolol succinate ER (TOPROL-XL) 100 MG 24 hr tablet Take 100 mg by mouth daily       omeprazole (PRILOSEC) 20 MG capsule [OMEPRAZOLE  (PRILOSEC) 20 MG CAPSULE] Take 20 mg by mouth daily.       rivaroxaban ANTICOAGULANT (XARELTO) 20 MG TABS tablet Take 1 tablet (20 mg) by mouth daily (with dinner) 90 tablet 1     sitagliptin (JANUVIA) 100 MG tablet Take 100 mg by mouth daily       TEKTURNA 150 mg tablet [TEKTURNA 150 MG TABLET] TAKE 1 TABLET BY MOUTH ONCE DAILY FOR BLOOD PRESSURE 90 tablet 3     acetaminophen (TYLENOL) 325 MG tablet Take 2 tablets (650 mg) by mouth every 4 hours as needed for other (For optimal non-opioid multimodal pain management to improve pain control.) (Patient not taking: No sig reported)       amoxicillin (AMOXIL) 500 MG tablet Take 4 tabs (to equal 2,000 mg) 1 hour prior to dental procedure. (Patient not taking: No sig reported) 4 tablet 5     nitroGLYcerin (NITROSTAT) 0.4 MG sublingual tablet Place 0.4 mg under the tongue every 5 minutes as needed for chest pain For chest pain place 1 tablet under the tongue every 5 minutes for 3 doses. If symptoms persist 5 minutes after 1st dose call 911. (Patient not taking: No sig reported)         ALLERGIES:    Allergies   Allergen Reactions     Amlodipine      Other reaction(s): Edema, leg swelling at high dose  Leg swelling at high doses       Hydrochlorothiazide      Other reaction(s): hypokalemia, Hypokalemia     Lisinopril Cough     Other reaction(s): cough     Losartan      Other reaction(s): face flushing, Flushing       NEW PMH/PSH: None    REVIEW OF SYSTEMS:  The patient completed a comprehensive 11 point review of systems (below), which was reviewed. Positives are as noted below.  Patient Supplied Answers to Review of Systems            PHYSICAL EXAM:  General: The patient was alert and conversant, and in no acute distress.    Neck: No palpable cervical lymphadenopathy, no significant tenderness to palpation of the thyrohyoid space, which was narrow. No obvious thyroid abnormality.  Well-healed left neck incision.  Resp: Breathing comfortably, no stridor or stertor.  Neuro:  Symmetric facial function. Other cranial nerve function as documented above.  Psych: Normal affect, pleasant and cooperative.  Voice/speech: Mild dysphonia characterized by breathiness, roughness and strain.      Intake scores  Last 2 Scores for Patient-Answered VHI Questionnaire  No flowsheet data found.   Last 2 Scores for Patient-Answered EAT Questionnaire  No flowsheet data found.   Last 2 Scores for Patient-Answered CSI Questionnaire  No flowsheet data found.        Procedure:   Flexible fiberoptic laryngoscopy and laryngovideostroboscopy  Indications: This procedure was warranted to evaluate the patient's laryngeal anatomy and function. Risks, benefits, and alternatives were discussed.  Description: After written informed consent was obtained, a time-out was performed to confirm patient identity, procedure, and procedure site. Topical 3% lidocaine with 0.25% phenylephrine was applied to the nasal cavities. I performed the endoscopy and no complications were apparent. Continuous and stroboscopic light were utilized to assess routine phonation and variable frequency phonation.  Performed by: Ophelia Ngo MD MPH  Findings: Normal nasopharynx. Normal base of tongue, valleculae, and epiglottis. Vocal fold mobility: right: normal; left: impaired. Medial edges of the vocal folds: smooth and straight on the right, slightly bowed on the left. No focal mucosal lesions were observed on the true vocal folds. Glissade produced appropriate elongation. There was moderate supraglottic recruitment with connected speech. Mucosa of false vocal folds, aryepiglottic folds, piriform sinuses, and posterior glottis unremarkable. Airway was patent.  No focal lesions on NBI.    The addition of stroboscopy allowed evaluation of the mucosal wave.   Amplitude: right: normal; left: normal. Symmetry: intermittent symmetry. Closure pattern: complete. Closure plane: at glottic level. Phase distribution: slightly open-phase  predominant.                        IMPRESSION AND PLAN:   Reggie Roper returns with possible subtle improvement of left true vocal fold mobility, but with persistent substantial limitation. His vocal function is acceptable, though not perfect. Swallowing is safe.    We discussed that by now, we expect that the previously injected material should be entirely resorbed.  Fortunately, his vocal function has remained improved.  He is not interested in further intervention right now, and also states the procedure was tough so he is glad not to need it right now. We did note that it can also be accomplished under general anesthesia, but if his voice is acceptable right now, the benefit may be limited.    We will plan to recheck mobility at approximately one year from onset, or sooner as needed.  He is planning a trip to [place] in October, so may come in slightly early.  He will also let us know if he feels he needs a repeat injection prior to his trip.    Today's visit required additional screening time, PPE, and cleaning measures to allow for a safe in-person visit, due to the public health emergency. I spent a total of 18 minutes on 5/9/2022 in chart review, review of tests, patient visit, documentation, care coordination, and/or discussion with other providers about the issues documented above, separate from any documented procedure(s).

## 2022-05-09 NOTE — PATIENT INSTRUCTIONS
1.  You were seen in the ENT Clinic today by . If you have any questions or concerns after your appointment, please call [phone]. Press option #1 for scheduling related needs. Press option #3 for Nurse advice.    2.  Plan is to return to clinic sept/october      Alexus Lucero LPN  [phone]  [facility] - Otolaryngology

## 2022-05-09 NOTE — NURSING NOTE
"Chief Complaint   Patient presents with     RECHECK     4 month follow up       Pulse 60, temperature 97.9  F (36.6  C), temperature source Temporal, height 1.778 m (5' 10\"), weight 98.9 kg (218 lb), SpO2 97 %.    Lucian Hastings, EMT  "

## 2022-05-26 ENCOUNTER — TRANSFERRED RECORDS (OUTPATIENT)
Dept: HEALTH INFORMATION MANAGEMENT | Facility: CLINIC | Age: 67
End: 2022-05-26

## 2022-05-26 ENCOUNTER — LAB REQUISITION (OUTPATIENT)
Dept: LAB | Facility: CLINIC | Age: 67
End: 2022-05-26
Payer: COMMERCIAL

## 2022-05-26 DIAGNOSIS — E11.9 TYPE 2 DIABETES MELLITUS WITHOUT COMPLICATIONS (H): ICD-10-CM

## 2022-05-26 LAB
ALBUMIN SERPL-MCNC: 4.2 G/DL (ref 3.5–5)
ALP SERPL-CCNC: 115 U/L (ref 45–120)
ALT SERPL W P-5'-P-CCNC: 44 U/L (ref 0–45)
ANION GAP SERPL CALCULATED.3IONS-SCNC: 8 MMOL/L (ref 5–18)
AST SERPL W P-5'-P-CCNC: 30 U/L (ref 0–40)
BILIRUB SERPL-MCNC: 1.1 MG/DL (ref 0–1)
BUN SERPL-MCNC: 13 MG/DL (ref 8–22)
CALCIUM SERPL-MCNC: 9.4 MG/DL (ref 8.5–10.5)
CHLORIDE BLD-SCNC: 103 MMOL/L (ref 98–107)
CO2 SERPL-SCNC: 30 MMOL/L (ref 22–31)
CREAT SERPL-MCNC: 0.83 MG/DL (ref 0.7–1.3)
GFR SERPL CREATININE-BSD FRML MDRD: >90 ML/MIN/1.73M2
GLUCOSE BLD-MCNC: 123 MG/DL (ref 70–125)
POTASSIUM BLD-SCNC: 3.8 MMOL/L (ref 3.5–5)
PROT SERPL-MCNC: 7 G/DL (ref 6–8)
SODIUM SERPL-SCNC: 141 MMOL/L (ref 136–145)

## 2022-05-26 PROCEDURE — 80053 COMPREHEN METABOLIC PANEL: CPT | Mod: ORL | Performed by: FAMILY MEDICINE

## 2022-09-21 ENCOUNTER — LAB REQUISITION (OUTPATIENT)
Dept: LAB | Facility: CLINIC | Age: 67
End: 2022-09-21
Payer: COMMERCIAL

## 2022-09-21 DIAGNOSIS — E11.9 TYPE 2 DIABETES MELLITUS WITHOUT COMPLICATIONS (H): ICD-10-CM

## 2022-09-21 LAB
ANION GAP SERPL CALCULATED.3IONS-SCNC: 12 MMOL/L (ref 7–15)
BUN SERPL-MCNC: 15.6 MG/DL (ref 8–23)
CALCIUM SERPL-MCNC: 9.1 MG/DL (ref 8.8–10.2)
CHLORIDE SERPL-SCNC: 103 MMOL/L (ref 98–107)
CREAT SERPL-MCNC: 0.83 MG/DL (ref 0.67–1.17)
DEPRECATED HCO3 PLAS-SCNC: 26 MMOL/L (ref 22–29)
GFR SERPL CREATININE-BSD FRML MDRD: >90 ML/MIN/1.73M2
GLUCOSE SERPL-MCNC: 105 MG/DL (ref 70–99)
POTASSIUM SERPL-SCNC: 4.1 MMOL/L (ref 3.4–5.3)
SODIUM SERPL-SCNC: 141 MMOL/L (ref 136–145)

## 2022-09-21 PROCEDURE — 80048 BASIC METABOLIC PNL TOTAL CA: CPT | Mod: ORL | Performed by: FAMILY MEDICINE

## 2022-09-22 ENCOUNTER — OFFICE VISIT (OUTPATIENT)
Dept: CARDIOLOGY | Facility: CLINIC | Age: 67
End: 2022-09-22
Attending: INTERNAL MEDICINE
Payer: COMMERCIAL

## 2022-09-22 VITALS
HEART RATE: 52 BPM | OXYGEN SATURATION: 99 % | BODY MASS INDEX: 31.57 KG/M2 | SYSTOLIC BLOOD PRESSURE: 138 MMHG | RESPIRATION RATE: 16 BRPM | WEIGHT: 220 LBS | DIASTOLIC BLOOD PRESSURE: 70 MMHG

## 2022-09-22 DIAGNOSIS — I10 BENIGN ESSENTIAL HYPERTENSION: ICD-10-CM

## 2022-09-22 DIAGNOSIS — I63.9 ACUTE EMBOLIC STROKE (H): ICD-10-CM

## 2022-09-22 DIAGNOSIS — Z98.890 S/P CAROTID ENDARTERECTOMY: ICD-10-CM

## 2022-09-22 DIAGNOSIS — I25.10 ATHEROSCLEROSIS OF NATIVE CORONARY ARTERY OF NATIVE HEART WITHOUT ANGINA PECTORIS: ICD-10-CM

## 2022-09-22 DIAGNOSIS — I48.0 PAF (PAROXYSMAL ATRIAL FIBRILLATION) (H): ICD-10-CM

## 2022-09-22 PROCEDURE — 99214 OFFICE O/P EST MOD 30 MIN: CPT | Performed by: INTERNAL MEDICINE

## 2022-09-22 NOTE — PROGRESS NOTES
[facility]  [phone]        Assessment/Recommendations   Patient with known history of paroxysmal atrial fibrillation, cerebrovascular accident status post left carotid endarterectomy, hypertension and mild coronary artery disease.  His blood pressure is at goal today.  His heart rate was initially slow but came up to 52 and I suspect there was some premature beats that were missed on the 43.  He has no lightheadedness or dizziness and continues to walk on a regular basis.  His LDL cholesterol is at goal.  He takes Xarelto because of elevated CHADS2 vascular score.    I think he can come off of the aspirin given that he is taking Xarelto.  We will check with the vascular surgeon to ensure that they do not want him on Xarelto plus an antiplatelet agent.  We will get back to the patient if he should stop his aspirin.    We will see him back in 1 year, but of course to be happy to see him sooner if questions or problems arise.       History of Present Illness/Subjective    Mr. Reggie Roper is a 67 year old male with known paroxysmal atrial fibrillation, hypertension, cerebrovascular accident with left carotid endarterectomy.  He has been feeling well.  He walks for about 40 to 45 minutes most days.  He works part-time but mostly enjoys group home.  He has not had any chest discomfort, orthopnea, paroxysmal nocturnal dyspnea, gets a little bit of peripheral edema if he sits for long periods of time.  He has not had any syncopal or near syncopal episodes.  Does not get lightheaded.         Physical Examination Review of Systems   /70 (BP Location: Left arm, Patient Position: Sitting, Cuff Size: Adult Regular)   Pulse 52   Resp 16   Wt 99.8 kg (220 lb)   SpO2 99%   BMI 31.57 kg/m    Body mass index is 31.57 kg/m .  Wt Readings from Last 3 Encounters:   09/22/22 99.8 kg (220 lb)   05/09/22 98.9 kg (218 lb)   02/01/22 100.2 kg (221 lb)     General Appearance:   Alert, cooperative and  in no acute distress.   ENT/Mouth: Patient wearing a mask.      EYES:  no scleral icterus, normal conjunctivae   Neck: JVP normal. No Hepatojugular reflux. Thyroid not visualized.  Scar in the left neck.   Chest/Lungs:   Lungs are clear to auscultation, equal chest wall expansion.   Cardiovascular:   S1, S2 without murmur ,clicks or rubs. Brachial, radial and posterior tibial pulses are intact and symetric. No carotid bruits noted   Abdomen:  Nontender.    Extremities: No cyanosis, clubbing and minimal ankle edema   Skin: no xanthelasma, warm.    Neurologic: normal arm movement bilateral, no tremors     Psychiatric: Appropriate affect.      Enc Vitals  BP: 138/70  Pulse: 52  Resp: 16  SpO2: 99 %  Weight: 99.8 kg (220 lb)                                           Medical History  Surgical History Family History Social History   Past Medical History:   Diagnosis Date     Abnormal stress test      Chest pain     Midsternal Pain     Diabetes mellitus (H)      Hyperlipidemia      Hypertension      Left carotid artery stenosis 1/19/2022     Shortness of breath on exertion     Past Surgical History:   Procedure Laterality Date     CATARACT EXTRACTION       ENDARTERECTOMY CAROTID Left 10/9/2021    Procedure: LEFT CAROTID ENDARTERECTOMY;  Surgeon: Jaycob Mayo MD;  Location:  OR     LARYNGOSCOPY, FLEXIBLE WITH INJECTION N/A 12/9/2021    Procedure: LARYNGOSCOPY, FLEXIBLE WITH INJECTION;  Surgeon: Ophelia Ngo MD;  [facility]    Family History   Problem Relation Age of Onset     Heart Disease Father      CABG Father 78.00    Social History     Socioeconomic History     Marital status:      Spouse name: Not on file     Number of children: Not on file     Years of education: Not on file     Highest education level: Not on file   Occupational History     Not on file   Tobacco Use     Smoking status: Never Smoker     Smokeless tobacco: Never Used   Substance and Sexual Activity     Alcohol  use: Yes     Comment: Alcoholic Drinks/day: occasional     Drug use: No     Sexual activity: Not on file   Other Topics Concern     Parent/sibling w/ CABG, MI or angioplasty before 65F 55M? Not Asked   Social History Narrative     Not on file     Social Determinants of Health     Financial Resource Strain: Not on file   Food Insecurity: Not on file   Transportation Needs: Not on file   Physical Activity: Not on file   Stress: Not on file   Social Connections: Not on file   Intimate Partner Violence: Not on file   Housing Stability: Not on file          Medications  Allergies   Current Outpatient Medications   Medication Sig Dispense Refill     acetaminophen (TYLENOL) 325 MG tablet Take 2 tablets (650 mg) by mouth every 4 hours as needed for other (For optimal non-opioid multimodal pain management to improve pain control.)       amoxicillin (AMOXIL) 500 MG tablet Take 4 tabs (to equal 2,000 mg) 1 hour prior to dental procedure. 4 tablet 5     aspirin (ASA) 81 MG chewable tablet Take 81 mg by mouth daily       atorvastatin (LIPITOR) 80 MG tablet 1 tablet (80 mg) by Oral or NG Tube route daily 30 tablet 0     blood glucose (RELION GLUCOSE TEST STRIPS) test strip by In Vitro route daily Use to test blood sugar 1 times daily or as directed.       dapagliflozin (FARXIGA) 10 MG TABS tablet Take 10 mg by mouth daily       Glucose Blood (RELION BLOOD GLUCOSE TEST VI) 1 each by In Vitro route daily       metFORMIN (GLUCOPHAGE) 500 MG tablet Take 1,500 mg by mouth daily       metoprolol succinate ER (TOPROL-XL) 100 MG 24 hr tablet Take 100 mg by mouth daily       nitroGLYcerin (NITROSTAT) 0.4 MG sublingual tablet Place 0.4 mg under the tongue every 5 minutes as needed for chest pain For chest pain place 1 tablet under the tongue every 5 minutes for 3 doses. If symptoms persist 5 minutes after 1st dose call 911.       omeprazole (PRILOSEC) 20 MG capsule [OMEPRAZOLE (PRILOSEC) 20 MG CAPSULE] Take 20 mg by mouth daily.        rivaroxaban ANTICOAGULANT (XARELTO) 20 MG TABS tablet Take 1 tablet (20 mg) by mouth daily (with dinner) 90 tablet 1     sitagliptin (JANUVIA) 100 MG tablet Take 100 mg by mouth daily       TEKTURNA 150 mg tablet [TEKTURNA 150 MG TABLET] TAKE 1 TABLET BY MOUTH ONCE DAILY FOR BLOOD PRESSURE 90 tablet 3    Allergies   Allergen Reactions     Amlodipine      Other reaction(s): Edema, leg swelling at high dose  Leg swelling at high doses       Hydrochlorothiazide      Other reaction(s): hypokalemia, Hypokalemia     Lisinopril Cough     Other reaction(s): cough     Losartan      Other reaction(s): face flushing, Flushing         Lab Results    Chemistry/lipid CBC Cardiac Enzymes/BNP/TSH/INR   Lab Results   Component Value Date    CHOL 119 12/30/2021    HDL 35 (L) 12/30/2021    TRIG 123 12/30/2021    BUN 15.6 09/21/2022     09/21/2022    CO2 26 09/21/2022    Lab Results   Component Value Date    WBC 5.9 10/09/2021    HGB 14.8 10/09/2021    HCT 44.2 10/09/2021    MCV 94 10/09/2021     (L) 10/09/2021    Lab Results   Component Value Date    TSH 1.79 10/07/2021

## 2022-09-22 NOTE — LETTER
9/22/2022    Simin Sanderson MD  [facility] [address] [place] [address]    RE: Reggie Roper       Dear Colleague,     I had the pleasure of seeing Reggie Roper in the [facility].      [facility]  [phone]        Assessment/Recommendations   Patient with known history of paroxysmal atrial fibrillation, cerebrovascular accident status post left carotid endarterectomy, hypertension and mild coronary artery disease.  His blood pressure is at goal today.  His heart rate was initially slow but came up to 52 and I suspect there was some premature beats that were missed on the 43.  He has no lightheadedness or dizziness and continues to walk on a regular basis.  His LDL cholesterol is at goal.  He takes Xarelto because of elevated CHADS2 vascular score.    I think he can come off of the aspirin given that he is taking Xarelto.  We will check with the vascular surgeon to ensure that they do not want him on Xarelto plus an antiplatelet agent.  We will get back to the patient if he should stop his aspirin.    We will see him back in 1 year, but of course to be happy to see him sooner if questions or problems arise.       History of Present Illness/Subjective    Mr. Reggie Roper is a 67 year old male with known paroxysmal atrial fibrillation, hypertension, cerebrovascular accident with left carotid endarterectomy.  He has been feeling well.  He walks for about 40 to 45 minutes most days.  He works part-time but mostly enjoys shelter.  He has not had any chest discomfort, orthopnea, paroxysmal nocturnal dyspnea, gets a little bit of peripheral edema if he sits for long periods of time.  He has not had any syncopal or near syncopal episodes.  Does not get lightheaded.         Physical Examination Review of Systems   /70 (BP Location: Left arm, Patient Position: Sitting, Cuff Size: Adult Regular)   Pulse 52   Resp 16   Wt 99.8 kg (220 lb)   SpO2 99%    BMI 31.57 kg/m    Body mass index is 31.57 kg/m .  Wt Readings from Last 3 Encounters:   09/22/22 99.8 kg (220 lb)   05/09/22 98.9 kg (218 lb)   02/01/22 100.2 kg (221 lb)     General Appearance:   Alert, cooperative and in no acute distress.   ENT/Mouth: Patient wearing a mask.      EYES:  no scleral icterus, normal conjunctivae   Neck: JVP normal. No Hepatojugular reflux. Thyroid not visualized.  Scar in the left neck.   Chest/Lungs:   Lungs are clear to auscultation, equal chest wall expansion.   Cardiovascular:   S1, S2 without murmur ,clicks or rubs. Brachial, radial and posterior tibial pulses are intact and symetric. No carotid bruits noted   Abdomen:  Nontender.    Extremities: No cyanosis, clubbing and minimal ankle edema   Skin: no xanthelasma, warm.    Neurologic: normal arm movement bilateral, no tremors     Psychiatric: Appropriate affect.      Enc Vitals  BP: 138/70  Pulse: 52  Resp: 16  SpO2: 99 %  Weight: 99.8 kg (220 lb)                                           Medical History  Surgical History Family History Social History   Past Medical History:   Diagnosis Date     Abnormal stress test      Chest pain     Midsternal Pain     Diabetes mellitus (H)      Hyperlipidemia      Hypertension      Left carotid artery stenosis 1/19/2022     Shortness of breath on exertion     Past Surgical History:   Procedure Laterality Date     CATARACT EXTRACTION       ENDARTERECTOMY CAROTID Left 10/9/2021    Procedure: LEFT CAROTID ENDARTERECTOMY;  Surgeon: Jaycob Mayo MD;  Location:  OR     LARYNGOSCOPY, FLEXIBLE WITH INJECTION N/A 12/9/2021    Procedure: LARYNGOSCOPY, FLEXIBLE WITH INJECTION;  Surgeon: Ophelia Ngo MD;  Location: [facility] OR    Family History   Problem Relation Age of Onset     Heart Disease Father      CABG Father 78.00    Social History     Socioeconomic History     Marital status:      Spouse name: Not on file     Number of children: Not on file     Years of  education: Not on file     Highest education level: Not on file   Occupational History     Not on file   Tobacco Use     Smoking status: Never Smoker     Smokeless tobacco: Never Used   Substance and Sexual Activity     Alcohol use: Yes     Comment: Alcoholic Drinks/day: occasional     Drug use: No     Sexual activity: Not on file   Other Topics Concern     Parent/sibling w/ CABG, MI or angioplasty before 65F 55M? Not Asked   Social History Narrative     Not on file     Social Determinants of Health     Financial Resource Strain: Not on file   Food Insecurity: Not on file   Transportation Needs: Not on file   Physical Activity: Not on file   Stress: Not on file   Social Connections: Not on file   Intimate Partner Violence: Not on file   Housing Stability: Not on file          Medications  Allergies   Current Outpatient Medications   Medication Sig Dispense Refill     acetaminophen (TYLENOL) 325 MG tablet Take 2 tablets (650 mg) by mouth every 4 hours as needed for other (For optimal non-opioid multimodal pain management to improve pain control.)       amoxicillin (AMOXIL) 500 MG tablet Take 4 tabs (to equal 2,000 mg) 1 hour prior to dental procedure. 4 tablet 5     aspirin (ASA) 81 MG chewable tablet Take 81 mg by mouth daily       atorvastatin (LIPITOR) 80 MG tablet 1 tablet (80 mg) by Oral or NG Tube route daily 30 tablet 0     blood glucose (RELION GLUCOSE TEST STRIPS) test strip by In Vitro route daily Use to test blood sugar 1 times daily or as directed.       dapagliflozin (FARXIGA) 10 MG TABS tablet Take 10 mg by mouth daily       Glucose Blood (RELION BLOOD GLUCOSE TEST VI) 1 each by In Vitro route daily       metFORMIN (GLUCOPHAGE) 500 MG tablet Take 1,500 mg by mouth daily       metoprolol succinate ER (TOPROL-XL) 100 MG 24 hr tablet Take 100 mg by mouth daily       nitroGLYcerin (NITROSTAT) 0.4 MG sublingual tablet Place 0.4 mg under the tongue every 5 minutes as needed for chest pain For chest pain place  1 tablet under the tongue every 5 minutes for 3 doses. If symptoms persist 5 minutes after 1st dose call 911.       omeprazole (PRILOSEC) 20 MG capsule [OMEPRAZOLE (PRILOSEC) 20 MG CAPSULE] Take 20 mg by mouth daily.       rivaroxaban ANTICOAGULANT (XARELTO) 20 MG TABS tablet Take 1 tablet (20 mg) by mouth daily (with dinner) 90 tablet 1     sitagliptin (JANUVIA) 100 MG tablet Take 100 mg by mouth daily       TEKTURNA 150 mg tablet [TEKTURNA 150 MG TABLET] TAKE 1 TABLET BY MOUTH ONCE DAILY FOR BLOOD PRESSURE 90 tablet 3    Allergies   Allergen Reactions     Amlodipine      Other reaction(s): Edema, leg swelling at high dose  Leg swelling at high doses       Hydrochlorothiazide      Other reaction(s): hypokalemia, Hypokalemia     Lisinopril Cough     Other reaction(s): cough     Losartan      Other reaction(s): face flushing, Flushing         Lab Results    Chemistry/lipid CBC Cardiac Enzymes/BNP/TSH/INR   Lab Results   Component Value Date    CHOL 119 12/30/2021    HDL 35 (L) 12/30/2021    TRIG 123 12/30/2021    BUN 15.6 09/21/2022     09/21/2022    CO2 26 09/21/2022    Lab Results   Component Value Date    WBC 5.9 10/09/2021    HGB 14.8 10/09/2021    HCT 44.2 10/09/2021    MCV 94 10/09/2021     (L) 10/09/2021    Lab Results   Component Value Date    TSH 1.79 10/07/2021              Thank you for allowing me to participate in the care of your patient.      Sincerely,     Nino Salazar MD     [facility]  cc:   Nino Salazar MD  [address]  [place] [address]

## 2022-09-26 ENCOUNTER — OFFICE VISIT (OUTPATIENT)
Dept: OTOLARYNGOLOGY | Facility: CLINIC | Age: 67
End: 2022-09-26
Payer: COMMERCIAL

## 2022-09-26 VITALS
DIASTOLIC BLOOD PRESSURE: 77 MMHG | HEIGHT: 70 IN | WEIGHT: 220 LBS | BODY MASS INDEX: 31.5 KG/M2 | SYSTOLIC BLOOD PRESSURE: 143 MMHG | HEART RATE: 66 BPM | OXYGEN SATURATION: 98 %

## 2022-09-26 DIAGNOSIS — R49.0 DYSPHONIA: Primary | ICD-10-CM

## 2022-09-26 DIAGNOSIS — J38.01 UNILATERAL PARTIAL VOCAL CORD PARALYSIS: ICD-10-CM

## 2022-09-26 DIAGNOSIS — Z98.890 S/P CAROTID ENDARTERECTOMY: ICD-10-CM

## 2022-09-26 PROCEDURE — 31579 LARYNGOSCOPY TELESCOPIC: CPT | Performed by: OTOLARYNGOLOGY

## 2022-09-26 PROCEDURE — 99213 OFFICE O/P EST LOW 20 MIN: CPT | Mod: 25 | Performed by: OTOLARYNGOLOGY

## 2022-09-26 ASSESSMENT — PAIN SCALES - GENERAL: PAINLEVEL: NO PAIN (0)

## 2022-09-26 NOTE — PROGRESS NOTES
Dear Colleague:    Reggie Roper recently returned for follow-up at the [facility]. My clinic note from our visit is enclosed below.  Speech recognition software may have been used in the documentation below; input is reviewed before signature to the best of my ability.     I appreciate the ongoing opportunity to participate in this patient's care.    Please feel free to contact me with any questions.    Sincerely yours,      Ophelia Ngo M.D., M.P.H.  , Laryngology  Director, [facility]  Otolaryngology- Head & Neck Surgery  [phone]        =====  HISTORY OF PRESENT ILLNESS:  Reggie Roper is a pleasant 67-year-old male with history of carotid endarterectomy in October 9, 2021 and left true vocal fold motion impairment who returns in follow up today.    He underwent flexible laryngoscopy with Prolaryn Gel injection to the left true vocal fold on 12/9/2021.    His voice has been doing well. He does not have to think about it very much. He feels it is 70-75% of normal.    His swallowing and breathing are fine.    He is leaving for [place] in October, and will be back briefly in December and then will be gone again until summer.        MEDICATIONS:     Current Outpatient Medications   Medication Sig Dispense Refill     acetaminophen (TYLENOL) 325 MG tablet Take 2 tablets (650 mg) by mouth every 4 hours as needed for other (For optimal non-opioid multimodal pain management to improve pain control.)       amoxicillin (AMOXIL) 500 MG tablet Take 4 tabs (to equal 2,000 mg) 1 hour prior to dental procedure. 4 tablet 5     aspirin (ASA) 81 MG chewable tablet Take 81 mg by mouth daily       atorvastatin (LIPITOR) 80 MG tablet 1 tablet (80 mg) by Oral or NG Tube route daily 30 tablet 0     blood glucose (RELION GLUCOSE TEST STRIPS) test strip by In Vitro route daily Use to test blood sugar 1 times daily or as directed.       dapagliflozin (FARXIGA) 10 MG TABS  tablet Take 10 mg by mouth daily       Glucose Blood (RELION BLOOD GLUCOSE TEST VI) 1 each by In Vitro route daily       metFORMIN (GLUCOPHAGE) 500 MG tablet Take 1,500 mg by mouth daily       metoprolol succinate ER (TOPROL-XL) 100 MG 24 hr tablet Take 100 mg by mouth daily       nitroGLYcerin (NITROSTAT) 0.4 MG sublingual tablet Place 0.4 mg under the tongue every 5 minutes as needed for chest pain For chest pain place 1 tablet under the tongue every 5 minutes for 3 doses. If symptoms persist 5 minutes after 1st dose call 911.       omeprazole (PRILOSEC) 20 MG capsule [OMEPRAZOLE (PRILOSEC) 20 MG CAPSULE] Take 20 mg by mouth daily.       rivaroxaban ANTICOAGULANT (XARELTO) 20 MG TABS tablet Take 1 tablet (20 mg) by mouth daily (with dinner) 90 tablet 1     sitagliptin (JANUVIA) 100 MG tablet Take 100 mg by mouth daily       TEKTURNA 150 mg tablet [TEKTURNA 150 MG TABLET] TAKE 1 TABLET BY MOUTH ONCE DAILY FOR BLOOD PRESSURE 90 tablet 3       ALLERGIES:    Allergies   Allergen Reactions     Amlodipine      Other reaction(s): Edema, leg swelling at high dose  Leg swelling at high doses       Hydrochlorothiazide      Other reaction(s): hypokalemia, Hypokalemia     Lisinopril Cough     Other reaction(s): cough     Losartan      Other reaction(s): face flushing, Flushing       NEW PMH/PSH: None    REVIEW OF SYSTEMS:  The patient completed a comprehensive 11 point review of systems (below), which was reviewed. Positives are as noted below.  Patient Supplied Answers to Review of Systems       PHYSICAL EXAM:  General: The patient was alert and conversant, and in no acute distress.    Neck: No palpable cervical lymphadenopathy, no significant tenderness to palpation of the thyrohyoid space, which was narrow. No obvious thyroid abnormality. Left CEA incision well-healed.  Resp: Breathing comfortably, no stridor or stertor.  Neuro: Symmetric facial function. Other cranial nerve function as documented above.  Psych: Normal  affect, pleasant and cooperative.  Voice/speech: Mild dysphonia characterized by intermittent mild breathiness.      Intake scores  VHI-10 total today: 10      Procedure:   Flexible fiberoptic laryngoscopy and laryngovideostroboscopy  Indications: This procedure was warranted to evaluate the patient's laryngeal anatomy and function. Risks, benefits, and alternatives were discussed.  Description: After written informed consent was obtained, a time-out was performed to confirm patient identity, procedure, and procedure site. Topical 3% lidocaine with 0.25% phenylephrine was applied to the nasal cavities. I performed the endoscopy and no complications were apparent. Continuous and stroboscopic light were utilized to assess routine phonation and variable frequency phonation.  Performed by: Ophelia Ngo MD MPH  Findings: Normal nasopharynx. Normal base of tongue, valleculae, and epiglottis. Vocal fold mobility: right: normal; left: impaired. Medial edges of the vocal folds: smooth and straight on right, very subtly bowed on left. No focal mucosal lesions were observed on the true vocal folds. Glissade produced appropriate elongation. There was moderate supraglottic recruitment with connected speech. Mucosa of false vocal folds, aryepiglottic folds, piriform sinuses, and posterior glottis unremarkable. Airway was patent.     Sticky secretions.    The addition of stroboscopy allowed evaluation of the mucosal wave.   Amplitude: right: normal; left: normal. Symmetry: intermittent symmetry. Closure pattern: slightly spindle-shaped. Closure plane: at glottic level. Phase distribution: slightly open-phase predominant.                    IMPRESSION AND PLAN:   Reggie Roper returns with persistent left vocal fold paresis, but fortunately has recovered enough tone that his voice is overall doing very well. He is also showing some limited mobility.    We discussed:  1) We could consider repeat laryngeal exam when he is back in  town, since we are not quite at one year out, but we also noted that we are nearly at one year and substantial mobility change beyond this timepoint is unlikely. He is still figuring out his travel schedule and would like to let us know what would work for him.  2) We could consider voice therapy if he wishes to further optimize his function. I encouraged him to make sure to take a good breath before speaking to improve respiratory recruitment. He is not sure if he is interested in voice therapy but will think about it and let us know if he would like to schedule after he returns.  3) I counseled him to make other providers aware of the left vocal fold paresis if he is ever having another neck/chest surgery or intubation, as they may not suspect it given his overall good voice quality. He understood the rationale for this.    I appreciate the opportunity to participate in the care of this pleasant patient.     Today's visit required additional screening time, PPE, and cleaning measures to allow for a safe in-person visit, due to the public health emergency. I spent a total of 24 minutes on 9/26/2022 in chart review, review of tests, patient visit, documentation, care coordination, and/or discussion with other providers about the issues documented above, separate from any documented procedure(s).

## 2022-09-26 NOTE — Clinical Note
9/26/2022       RE: Reggie Roper  [address]  [facility] [address]     Dear Colleague,    Thank you for referring your patient, Reggie Roper, to the [facility] [place] at [facility]. Please see a copy of my visit note below.    Dear Colleague:    Reggie Roper recently returned for follow-up at the [facility]. My clinic note from our visit is enclosed below.  Speech recognition software may have been used in the documentation below; input is reviewed before signature to the best of my ability.     I appreciate the ongoing opportunity to participate in this patient's care.    Please feel free to contact me with any questions.    Sincerely yours,      Ophelia Ngo M.D., M.P.H.  , Laryngology  Director, [facility]  Otolaryngology- Head & Neck Surgery  [phone]        =====  HISTORY OF PRESENT ILLNESS:  Reggie Roper is a pleasant 67-year-old male with history of carotid endarterectomy in October 9, 2021 and left true vocal fold motion impairment who returns in follow up today.    He underwent flexible laryngoscopy with Prolaryn Gel injection to the left true vocal fold on 12/9/2021.    His voice has been doing well. He does not have to think about it very much. He feels it is 70-75% of normal.    His swallowing and breathing are fine.    He is leaving for [place] in October, and will be back briefly in December and then will be gone again until summer.        MEDICATIONS:     Current Outpatient Medications   Medication Sig Dispense Refill     acetaminophen (TYLENOL) 325 MG tablet Take 2 tablets (650 mg) by mouth every 4 hours as needed for other (For optimal non-opioid multimodal pain management to improve pain control.)       amoxicillin (AMOXIL) 500 MG tablet Take 4 tabs (to equal 2,000 mg) 1 hour prior to dental procedure. 4 tablet 5     aspirin (ASA) 81 MG  chewable tablet Take 81 mg by mouth daily       atorvastatin (LIPITOR) 80 MG tablet 1 tablet (80 mg) by Oral or NG Tube route daily 30 tablet 0     blood glucose (RELION GLUCOSE TEST STRIPS) test strip by In Vitro route daily Use to test blood sugar 1 times daily or as directed.       dapagliflozin (FARXIGA) 10 MG TABS tablet Take 10 mg by mouth daily       Glucose Blood (RELION BLOOD GLUCOSE TEST VI) 1 each by In Vitro route daily       metFORMIN (GLUCOPHAGE) 500 MG tablet Take 1,500 mg by mouth daily       metoprolol succinate ER (TOPROL-XL) 100 MG 24 hr tablet Take 100 mg by mouth daily       nitroGLYcerin (NITROSTAT) 0.4 MG sublingual tablet Place 0.4 mg under the tongue every 5 minutes as needed for chest pain For chest pain place 1 tablet under the tongue every 5 minutes for 3 doses. If symptoms persist 5 minutes after 1st dose call 911.       omeprazole (PRILOSEC) 20 MG capsule [OMEPRAZOLE (PRILOSEC) 20 MG CAPSULE] Take 20 mg by mouth daily.       rivaroxaban ANTICOAGULANT (XARELTO) 20 MG TABS tablet Take 1 tablet (20 mg) by mouth daily (with dinner) 90 tablet 1     sitagliptin (JANUVIA) 100 MG tablet Take 100 mg by mouth daily       TEKTURNA 150 mg tablet [TEKTURNA 150 MG TABLET] TAKE 1 TABLET BY MOUTH ONCE DAILY FOR BLOOD PRESSURE 90 tablet 3       ALLERGIES:    Allergies   Allergen Reactions     Amlodipine      Other reaction(s): Edema, leg swelling at high dose  Leg swelling at high doses       Hydrochlorothiazide      Other reaction(s): hypokalemia, Hypokalemia     Lisinopril Cough     Other reaction(s): cough     Losartan      Other reaction(s): face flushing, Flushing       NEW PMH/PSH: None    REVIEW OF SYSTEMS:  The patient completed a comprehensive 11 point review of systems (below), which was reviewed. Positives are as noted below.  Patient Supplied Answers to Review of Systems       PHYSICAL EXAM:  General: The patient was alert and conversant, and in no acute distress.    Neck: No palpable  cervical lymphadenopathy, no significant tenderness to palpation of the thyrohyoid space, which was narrow. No obvious thyroid abnormality. Left CEA incision well-healed.  Resp: Breathing comfortably, no stridor or stertor.  Neuro: Symmetric facial function. Other cranial nerve function as documented above.  Psych: Normal affect, pleasant and cooperative.  Voice/speech: Mild dysphonia characterized by intermittent mild breathiness.      Intake scores  VHI-10 total today: 10      Procedure:   Flexible fiberoptic laryngoscopy and laryngovideostroboscopy  Indications: This procedure was warranted to evaluate the patient's laryngeal anatomy and function. Risks, benefits, and alternatives were discussed.  Description: After written informed consent was obtained, a time-out was performed to confirm patient identity, procedure, and procedure site. Topical 3% lidocaine with 0.25% phenylephrine was applied to the nasal cavities. I performed the endoscopy and no complications were apparent. Continuous and stroboscopic light were utilized to assess routine phonation and variable frequency phonation.  Performed by: Ophelia Ngo MD MPH  Findings: Normal nasopharynx. Normal base of tongue, valleculae, and epiglottis. Vocal fold mobility: right: normal; left: impaired. Medial edges of the vocal folds: smooth and straight on right, very subtly bowed on left. No focal mucosal lesions were observed on the true vocal folds. Glissade produced appropriate elongation. There was moderate supraglottic recruitment with connected speech. Mucosa of false vocal folds, aryepiglottic folds, piriform sinuses, and posterior glottis unremarkable. Airway was patent.     Sticky secretions.    The addition of stroboscopy allowed evaluation of the mucosal wave.   Amplitude: right: normal; left: normal. Symmetry: intermittent symmetry. Closure pattern: slightly spindle-shaped. Closure plane: at glottic level. Phase distribution: slightly open-phase  predominant.                    IMPRESSION AND PLAN:   Reggie Roper returns with persistent left vocal fold paresis, but fortunately has recovered enough tone that his voice is overall doing very well. He is also showing some limited mobility.    We discussed:  1) We could consider repeat laryngeal exam when he is back in town, since we are not quite at one year out, but we also noted that we are nearly at one year and substantial mobility change beyond this timepoint is uncommon. He is still figuring out his travel schedule and would like to let us know what would work for him.  2) We could consider voice therapy if he wishes to further optimize his function. We encouraged him to make sure to take a good breath before speaking to improve respiratory recruitment. He is not sure if he is interested but will think about it and let us know if he would like to schedule after he returns.  3) We counseled him to make other providers aware of the left vocal fold paresis if he is ever having another neck/chest surgery or intubation, as they may not suspect it given his overall good voice quality. He understood the rationale for this.    I appreciate the opportunity to participate in the care of this pleasant patient.     Today's visit required additional screening time, PPE, and cleaning measures to allow for a safe in-person visit, due to the public health emergency. I spent a total of 24 minutes on 9/26/2022 in chart review, review of tests, patient visit, documentation, care coordination, and/or discussion with other providers about the issues documented above, separate from any documented procedure(s).      Dear Colleague:    Reggie Roper recently returned for follow-up at the [facility]. My clinic note from our visit is enclosed below.  Speech recognition software may have been used in the documentation below; input is reviewed before signature to the best of my ability.     I appreciate the ongoing opportunity  to participate in this patient's care.    Please feel free to contact me with any questions.    Sincerely yours,      Ophelia Ngo M.D., M.P.H.  , Laryngology  Director, [facility]  Otolaryngology- Head & Neck Surgery  [phone]        =====  HISTORY OF PRESENT ILLNESS:  Reggie Roper is a pleasant 67-year-old male with history of carotid endarterectomy in October 9, 2021 and left true vocal fold motion impairment who returns in follow up today.    He underwent flexible laryngoscopy with Prolaryn Gel injection to the left true vocal fold on 12/9/2021.    His voice has been doing well. He does not have to think about it very much. He feels it is 70-75% of normal.    His swallowing and breathing are fine.    He is leaving for [place] in October, and will be back briefly in December and then will be gone again until summer.        MEDICATIONS:     Current Outpatient Medications   Medication Sig Dispense Refill     acetaminophen (TYLENOL) 325 MG tablet Take 2 tablets (650 mg) by mouth every 4 hours as needed for other (For optimal non-opioid multimodal pain management to improve pain control.)       amoxicillin (AMOXIL) 500 MG tablet Take 4 tabs (to equal 2,000 mg) 1 hour prior to dental procedure. 4 tablet 5     aspirin (ASA) 81 MG chewable tablet Take 81 mg by mouth daily       atorvastatin (LIPITOR) 80 MG tablet 1 tablet (80 mg) by Oral or NG Tube route daily 30 tablet 0     blood glucose (RELION GLUCOSE TEST STRIPS) test strip by In Vitro route daily Use to test blood sugar 1 times daily or as directed.       dapagliflozin (FARXIGA) 10 MG TABS tablet Take 10 mg by mouth daily       Glucose Blood (RELION BLOOD GLUCOSE TEST VI) 1 each by In Vitro route daily       metFORMIN (GLUCOPHAGE) 500 MG tablet Take 1,500 mg by mouth daily       metoprolol succinate ER (TOPROL-XL) 100 MG 24 hr tablet Take 100 mg by mouth daily       nitroGLYcerin (NITROSTAT) 0.4 MG  sublingual tablet Place 0.4 mg under the tongue every 5 minutes as needed for chest pain For chest pain place 1 tablet under the tongue every 5 minutes for 3 doses. If symptoms persist 5 minutes after 1st dose call 911.       omeprazole (PRILOSEC) 20 MG capsule [OMEPRAZOLE (PRILOSEC) 20 MG CAPSULE] Take 20 mg by mouth daily.       rivaroxaban ANTICOAGULANT (XARELTO) 20 MG TABS tablet Take 1 tablet (20 mg) by mouth daily (with dinner) 90 tablet 1     sitagliptin (JANUVIA) 100 MG tablet Take 100 mg by mouth daily       TEKTURNA 150 mg tablet [TEKTURNA 150 MG TABLET] TAKE 1 TABLET BY MOUTH ONCE DAILY FOR BLOOD PRESSURE 90 tablet 3       ALLERGIES:    Allergies   Allergen Reactions     Amlodipine      Other reaction(s): Edema, leg swelling at high dose  Leg swelling at high doses       Hydrochlorothiazide      Other reaction(s): hypokalemia, Hypokalemia     Lisinopril Cough     Other reaction(s): cough     Losartan      Other reaction(s): face flushing, Flushing       NEW PMH/PSH: None    REVIEW OF SYSTEMS:  The patient completed a comprehensive 11 point review of systems (below), which was reviewed. Positives are as noted below.  Patient Supplied Answers to Review of Systems       PHYSICAL EXAM:  General: The patient was alert and conversant, and in no acute distress.    Neck: No palpable cervical lymphadenopathy, no significant tenderness to palpation of the thyrohyoid space, which was narrow. No obvious thyroid abnormality. Left CEA incision well-healed.  Resp: Breathing comfortably, no stridor or stertor.  Neuro: Symmetric facial function. Other cranial nerve function as documented above.  Psych: Normal affect, pleasant and cooperative.  Voice/speech: Mild dysphonia characterized by intermittent mild breathiness.      Intake scores  VHI-10 total today: 10      Procedure:   Flexible fiberoptic laryngoscopy and laryngovideostroboscopy  Indications: This procedure was warranted to evaluate the patient's laryngeal  anatomy and function. Risks, benefits, and alternatives were discussed.  Description: After written informed consent was obtained, a time-out was performed to confirm patient identity, procedure, and procedure site. Topical 3% lidocaine with 0.25% phenylephrine was applied to the nasal cavities. I performed the endoscopy and no complications were apparent. Continuous and stroboscopic light were utilized to assess routine phonation and variable frequency phonation.  Performed by: Ophelia Ngo MD MPH  Findings: Normal nasopharynx. Normal base of tongue, valleculae, and epiglottis. Vocal fold mobility: right: normal; left: impaired. Medial edges of the vocal folds: smooth and straight on right, very subtly bowed on left. No focal mucosal lesions were observed on the true vocal folds. Glissade produced appropriate elongation. There was moderate supraglottic recruitment with connected speech. Mucosa of false vocal folds, aryepiglottic folds, piriform sinuses, and posterior glottis unremarkable. Airway was patent.     Sticky secretions.    The addition of stroboscopy allowed evaluation of the mucosal wave.   Amplitude: right: normal; left: normal. Symmetry: intermittent symmetry. Closure pattern: slightly spindle-shaped. Closure plane: at glottic level. Phase distribution: slightly open-phase predominant.                    IMPRESSION AND PLAN:   Reggie Roper returns with persistent left vocal fold paresis, but fortunately has recovered enough tone that his voice is overall doing very well. He is also showing some limited mobility.    We discussed:  1) We could consider repeat laryngeal exam when he is back in town, since we are not quite at one year out, but we also noted that we are nearly at one year and substantial mobility change beyond this timepoint is unlikely. He is still figuring out his travel schedule and would like to let us know what would work for him.  2) We could consider voice therapy if he wishes  to further optimize his function. I encouraged him to make sure to take a good breath before speaking to improve respiratory recruitment. He is not sure if he is interested in voice therapy but will think about it and let us know if he would like to schedule after he returns.  3) I counseled him to make other providers aware of the left vocal fold paresis if he is ever having another neck/chest surgery or intubation, as they may not suspect it given his overall good voice quality. He understood the rationale for this.    I appreciate the opportunity to participate in the care of this pleasant patient.     Today's visit required additional screening time, PPE, and cleaning measures to allow for a safe in-person visit, due to the public health emergency. I spent a total of 24 minutes on 9/26/2022 in chart review, review of tests, patient visit, documentation, care coordination, and/or discussion with other providers about the issues documented above, separate from any documented procedure(s).        Again, thank you for allowing me to participate in the care of your patient.      Sincerely,    Ophelia Ngo MD

## 2022-09-26 NOTE — LETTER
9/26/2022      RE: Reggie Roper  [address]  [facility] [address]       Dear Colleague:    Reggie Roper recently returned for follow-up at the [facility]. My clinic note from our visit is enclosed below.  Speech recognition software may have been used in the documentation below; input is reviewed before signature to the best of my ability.     I appreciate the ongoing opportunity to participate in this patient's care.    Please feel free to contact me with any questions.    Sincerely yours,      Ophelia Ngo M.D., M.P.H.  , Laryngology  Director, [facility]  Otolaryngology- Head & Neck Surgery  [phone]        =====  HISTORY OF PRESENT ILLNESS:  Reggie Roper is a pleasant 67-year-old male with history of carotid endarterectomy in October 9, 2021 and left true vocal fold motion impairment who returns in follow up today.    He underwent flexible laryngoscopy with Prolaryn Gel injection to the left true vocal fold on 12/9/2021.    His voice has been doing well. He does not have to think about it very much. He feels it is 70-75% of normal.    His swallowing and breathing are fine.    He is leaving for [place] in October, and will be back briefly in December and then will be gone again until summer.        MEDICATIONS:     Current Outpatient Medications   Medication Sig Dispense Refill     acetaminophen (TYLENOL) 325 MG tablet Take 2 tablets (650 mg) by mouth every 4 hours as needed for other (For optimal non-opioid multimodal pain management to improve pain control.)       amoxicillin (AMOXIL) 500 MG tablet Take 4 tabs (to equal 2,000 mg) 1 hour prior to dental procedure. 4 tablet 5     aspirin (ASA) 81 MG chewable tablet Take 81 mg by mouth daily       atorvastatin (LIPITOR) 80 MG tablet 1 tablet (80 mg) by Oral or NG Tube route daily 30 tablet 0     blood glucose (RELION GLUCOSE TEST STRIPS) test strip by In Vitro route daily Use to test  blood sugar 1 times daily or as directed.       dapagliflozin (FARXIGA) 10 MG TABS tablet Take 10 mg by mouth daily       Glucose Blood (RELION BLOOD GLUCOSE TEST VI) 1 each by In Vitro route daily       metFORMIN (GLUCOPHAGE) 500 MG tablet Take 1,500 mg by mouth daily       metoprolol succinate ER (TOPROL-XL) 100 MG 24 hr tablet Take 100 mg by mouth daily       nitroGLYcerin (NITROSTAT) 0.4 MG sublingual tablet Place 0.4 mg under the tongue every 5 minutes as needed for chest pain For chest pain place 1 tablet under the tongue every 5 minutes for 3 doses. If symptoms persist 5 minutes after 1st dose call 911.       omeprazole (PRILOSEC) 20 MG capsule [OMEPRAZOLE (PRILOSEC) 20 MG CAPSULE] Take 20 mg by mouth daily.       rivaroxaban ANTICOAGULANT (XARELTO) 20 MG TABS tablet Take 1 tablet (20 mg) by mouth daily (with dinner) 90 tablet 1     sitagliptin (JANUVIA) 100 MG tablet Take 100 mg by mouth daily       TEKTURNA 150 mg tablet [TEKTURNA 150 MG TABLET] TAKE 1 TABLET BY MOUTH ONCE DAILY FOR BLOOD PRESSURE 90 tablet 3       ALLERGIES:    Allergies   Allergen Reactions     Amlodipine      Other reaction(s): Edema, leg swelling at high dose  Leg swelling at high doses       Hydrochlorothiazide      Other reaction(s): hypokalemia, Hypokalemia     Lisinopril Cough     Other reaction(s): cough     Losartan      Other reaction(s): face flushing, Flushing       NEW PMH/PSH: None    REVIEW OF SYSTEMS:  The patient completed a comprehensive 11 point review of systems (below), which was reviewed. Positives are as noted below.  Patient Supplied Answers to Review of Systems       PHYSICAL EXAM:  General: The patient was alert and conversant, and in no acute distress.    Neck: No palpable cervical lymphadenopathy, no significant tenderness to palpation of the thyrohyoid space, which was narrow. No obvious thyroid abnormality. Left CEA incision well-healed.  Resp: Breathing comfortably, no stridor or stertor.  Neuro: Symmetric  facial function. Other cranial nerve function as documented above.  Psych: Normal affect, pleasant and cooperative.  Voice/speech: Mild dysphonia characterized by intermittent mild breathiness.      Intake scores  VHI-10 total today: 10      Procedure:   Flexible fiberoptic laryngoscopy and laryngovideostroboscopy  Indications: This procedure was warranted to evaluate the patient's laryngeal anatomy and function. Risks, benefits, and alternatives were discussed.  Description: After written informed consent was obtained, a time-out was performed to confirm patient identity, procedure, and procedure site. Topical 3% lidocaine with 0.25% phenylephrine was applied to the nasal cavities. I performed the endoscopy and no complications were apparent. Continuous and stroboscopic light were utilized to assess routine phonation and variable frequency phonation.  Performed by: Ophelia Ngo MD MPH  Findings: Normal nasopharynx. Normal base of tongue, valleculae, and epiglottis. Vocal fold mobility: right: normal; left: impaired. Medial edges of the vocal folds: smooth and straight on right, very subtly bowed on left. No focal mucosal lesions were observed on the true vocal folds. Glissade produced appropriate elongation. There was moderate supraglottic recruitment with connected speech. Mucosa of false vocal folds, aryepiglottic folds, piriform sinuses, and posterior glottis unremarkable. Airway was patent.     Sticky secretions.    The addition of stroboscopy allowed evaluation of the mucosal wave.   Amplitude: right: normal; left: normal. Symmetry: intermittent symmetry. Closure pattern: slightly spindle-shaped. Closure plane: at glottic level. Phase distribution: slightly open-phase predominant.                    IMPRESSION AND PLAN:   Reggie Roper returns with persistent left vocal fold paresis, but fortunately has recovered enough tone that his voice is overall doing very well. He is also showing some limited  mobility.    We discussed:  1) We could consider repeat laryngeal exam when he is back in town, since we are not quite at one year out, but we also noted that we are nearly at one year and substantial mobility change beyond this timepoint is unlikely. He is still figuring out his travel schedule and would like to let us know what would work for him.  2) We could consider voice therapy if he wishes to further optimize his function. I encouraged him to make sure to take a good breath before speaking to improve respiratory recruitment. He is not sure if he is interested in voice therapy but will think about it and let us know if he would like to schedule after he returns.  3) I counseled him to make other providers aware of the left vocal fold paresis if he is ever having another neck/chest surgery or intubation, as they may not suspect it given his overall good voice quality. He understood the rationale for this.    I appreciate the opportunity to participate in the care of this pleasant patient.     Today's visit required additional screening time, PPE, and cleaning measures to allow for a safe in-person visit, due to the public health emergency. I spent a total of 24 minutes on 9/26/2022 in chart review, review of tests, patient visit, documentation, care coordination, and/or discussion with other providers about the issues documented above, separate from any documented procedure(s).        Ophelia Ngo MD

## 2022-09-26 NOTE — PATIENT INSTRUCTIONS
1.  You were seen in the ENT Clinic today by . If you have any questions or concerns after your appointment, please call [phone]. Press option #1 for scheduling related needs. Press option #3 for Nurse advice.    2.   has recommended the following:   - consider voice therapy for voice optimization    3.  Plan is to return to clinic as needed.      Alexus Lucero LPN  [phone]  [facility] - Otolaryngology

## 2022-11-10 NOTE — TELEPHONE ENCOUNTER
FUTURE VISIT INFORMATION      FUTURE VISIT INFORMATION:    Date: 11/29/21    Time: 4:30 PM    [place] [facility]  REFERRAL INFORMATION:    Referring provider: Dr. Jaycob Mayo    Referring providers clinic: [facility]    Reason for visit/diagnosis: Hoarseness, potential injury to the vagus nerve or the recurrent laryngeal nerve    RECORDS REQUESTED FROM:       Clinic name Comments Records Status Imaging Status   [place] 11/1/21 - [facility] OV with Dr. Mayo [facility] 10/7/21 - [facility]21 - [facility] Head Neck [facility]                         
Male

## 2022-12-21 ENCOUNTER — LAB REQUISITION (OUTPATIENT)
Dept: LAB | Facility: CLINIC | Age: 67
End: 2022-12-21
Payer: COMMERCIAL

## 2022-12-21 DIAGNOSIS — Z12.5 ENCOUNTER FOR SCREENING FOR MALIGNANT NEOPLASM OF PROSTATE: ICD-10-CM

## 2022-12-21 DIAGNOSIS — E11.9 TYPE 2 DIABETES MELLITUS WITHOUT COMPLICATIONS (H): ICD-10-CM

## 2022-12-21 LAB
CHOLEST SERPL-MCNC: 133 MG/DL
HDLC SERPL-MCNC: 36 MG/DL
LDLC SERPL CALC-MCNC: 56 MG/DL
NONHDLC SERPL-MCNC: 97 MG/DL
PSA SERPL-MCNC: 0.56 NG/ML (ref 0–4.5)
TRIGL SERPL-MCNC: 203 MG/DL

## 2022-12-21 PROCEDURE — 80061 LIPID PANEL: CPT | Mod: ORL | Performed by: FAMILY MEDICINE

## 2022-12-21 PROCEDURE — G0103 PSA SCREENING: HCPCS | Mod: ORL | Performed by: FAMILY MEDICINE

## 2023-03-30 ENCOUNTER — LAB REQUISITION (OUTPATIENT)
Dept: LAB | Facility: CLINIC | Age: 68
End: 2023-03-30
Payer: COMMERCIAL

## 2023-03-30 DIAGNOSIS — I10 ESSENTIAL (PRIMARY) HYPERTENSION: ICD-10-CM

## 2023-03-30 LAB
ALBUMIN SERPL BCG-MCNC: 4.5 G/DL (ref 3.5–5.2)
ALP SERPL-CCNC: 104 U/L (ref 40–129)
ALT SERPL W P-5'-P-CCNC: 38 U/L (ref 10–50)
ANION GAP SERPL CALCULATED.3IONS-SCNC: 14 MMOL/L (ref 7–15)
AST SERPL W P-5'-P-CCNC: 26 U/L (ref 10–50)
BILIRUB SERPL-MCNC: 0.9 MG/DL
BUN SERPL-MCNC: 13.7 MG/DL (ref 8–23)
CALCIUM SERPL-MCNC: 9.4 MG/DL (ref 8.8–10.2)
CHLORIDE SERPL-SCNC: 100 MMOL/L (ref 98–107)
CREAT SERPL-MCNC: 0.85 MG/DL (ref 0.67–1.17)
DEPRECATED HCO3 PLAS-SCNC: 27 MMOL/L (ref 22–29)
GFR SERPL CREATININE-BSD FRML MDRD: >90 ML/MIN/1.73M2
GLUCOSE SERPL-MCNC: 147 MG/DL (ref 70–99)
POTASSIUM SERPL-SCNC: 3.7 MMOL/L (ref 3.4–5.3)
PROT SERPL-MCNC: 6.9 G/DL (ref 6.4–8.3)
SODIUM SERPL-SCNC: 141 MMOL/L (ref 136–145)

## 2023-03-30 PROCEDURE — 80053 COMPREHEN METABOLIC PANEL: CPT | Mod: ORL | Performed by: FAMILY MEDICINE

## 2023-04-03 ENCOUNTER — HOSPITAL ENCOUNTER (OUTPATIENT)
Dept: ULTRASOUND IMAGING | Facility: CLINIC | Age: 68
Discharge: HOME | End: 2023-04-03
Attending: SURGERY
Payer: COMMERCIAL

## 2023-04-03 ENCOUNTER — OFFICE VISIT (OUTPATIENT)
Dept: OTHER | Facility: CLINIC | Age: 68
End: 2023-04-03
Attending: SURGERY
Payer: COMMERCIAL

## 2023-04-03 VITALS — DIASTOLIC BLOOD PRESSURE: 67 MMHG | SYSTOLIC BLOOD PRESSURE: 101 MMHG | HEART RATE: 50 BPM

## 2023-04-03 DIAGNOSIS — I65.22 CAROTID ARTERY STENOSIS, SYMPTOMATIC, LEFT: Primary | ICD-10-CM

## 2023-04-03 DIAGNOSIS — I65.22 CAROTID ARTERY STENOSIS, SYMPTOMATIC, LEFT: ICD-10-CM

## 2023-04-03 PROCEDURE — 93880 EXTRACRANIAL BILAT STUDY: CPT | Mod: 26 | Performed by: SURGERY

## 2023-04-03 PROCEDURE — G0463 HOSPITAL OUTPT CLINIC VISIT: HCPCS | Mod: 25

## 2023-04-03 PROCEDURE — 99213 OFFICE O/P EST LOW 20 MIN: CPT | Performed by: SURGERY

## 2023-04-03 PROCEDURE — G0463 HOSPITAL OUTPT CLINIC VISIT: HCPCS | Mod: 25 | Performed by: SURGERY

## 2023-04-03 PROCEDURE — 93880 EXTRACRANIAL BILAT STUDY: CPT

## 2023-04-03 NOTE — PROGRESS NOTES
[facility]        Patient is here for a  follow up.     Pt is currently taking Aspirin, Statin and Xarelto.    /67 (BP Location: Left arm, Patient Position: Chair, Cuff Size: Adult Regular)   Pulse 50     The provider has been notified that the patient has no concerns.     Questions patient would like addressed today are: N/A.    Refills are needed: N/A    Has homecare services and agency name:  Parul Sanabria MA
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
## 2023-07-11 ENCOUNTER — TRANSFERRED RECORDS (OUTPATIENT)
Dept: HEALTH INFORMATION MANAGEMENT | Facility: CLINIC | Age: 68
End: 2023-07-11

## 2023-10-02 ENCOUNTER — OFFICE VISIT (OUTPATIENT)
Dept: CARDIOLOGY | Facility: CLINIC | Age: 68
End: 2023-10-02
Payer: COMMERCIAL

## 2023-10-02 VITALS
HEIGHT: 70 IN | HEART RATE: 96 BPM | SYSTOLIC BLOOD PRESSURE: 128 MMHG | DIASTOLIC BLOOD PRESSURE: 76 MMHG | BODY MASS INDEX: 32.5 KG/M2 | WEIGHT: 227 LBS | RESPIRATION RATE: 16 BRPM

## 2023-10-02 DIAGNOSIS — I48.0 PAF (PAROXYSMAL ATRIAL FIBRILLATION) (H): ICD-10-CM

## 2023-10-02 DIAGNOSIS — I25.10 ATHEROSCLEROSIS OF NATIVE CORONARY ARTERY OF NATIVE HEART WITHOUT ANGINA PECTORIS: ICD-10-CM

## 2023-10-02 DIAGNOSIS — R60.0 LOCALIZED EDEMA: ICD-10-CM

## 2023-10-02 DIAGNOSIS — E78.2 MIXED HYPERLIPIDEMIA: Primary | ICD-10-CM

## 2023-10-02 DIAGNOSIS — I10 BENIGN ESSENTIAL HYPERTENSION: ICD-10-CM

## 2023-10-02 PROCEDURE — 99214 OFFICE O/P EST MOD 30 MIN: CPT | Performed by: INTERNAL MEDICINE
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
## 2023-10-17 ENCOUNTER — HOSPITAL ENCOUNTER (OUTPATIENT)
Dept: CARDIOLOGY | Facility: CLINIC | Age: 68
Discharge: HOME | End: 2023-10-17
Attending: INTERNAL MEDICINE
Payer: COMMERCIAL

## 2023-10-17 DIAGNOSIS — I25.10 ATHEROSCLEROSIS OF NATIVE CORONARY ARTERY OF NATIVE HEART WITHOUT ANGINA PECTORIS: ICD-10-CM

## 2023-10-17 DIAGNOSIS — E78.2 MIXED HYPERLIPIDEMIA: ICD-10-CM

## 2023-10-17 DIAGNOSIS — I48.0 PAF (PAROXYSMAL ATRIAL FIBRILLATION) (H): ICD-10-CM

## 2023-10-17 DIAGNOSIS — I10 BENIGN ESSENTIAL HYPERTENSION: ICD-10-CM

## 2023-10-17 PROCEDURE — 255N000002 HC RX 255 OP 636: Performed by: INTERNAL MEDICINE

## 2023-10-17 PROCEDURE — 93306 TTE W/DOPPLER COMPLETE: CPT | Mod: 26 | Performed by: INTERNAL MEDICINE

## 2023-10-17 PROCEDURE — 93270 REMOTE 30 DAY ECG REV/REPORT: CPT

## 2023-10-17 RX ADMIN — PERFLUTREN 2.5 ML: 6.52 INJECTION, SUSPENSION INTRAVENOUS at 13:30

## 2023-10-18 ENCOUNTER — TELEPHONE (OUTPATIENT)
Dept: CARDIOLOGY | Facility: CLINIC | Age: 68
End: 2023-10-18
Payer: COMMERCIAL

## 2023-10-18 DIAGNOSIS — I48.0 PAF (PAROXYSMAL ATRIAL FIBRILLATION) (H): Primary | ICD-10-CM

## 2023-10-18 NOTE — TELEPHONE ENCOUNTER
----- Message from YASEMIN Lee sent at 10/18/2023  7:47 AM CDT -----  Regarding: MD notify  Two MD notifies saved for your review, thanks!

## 2023-10-18 NOTE — TELEPHONE ENCOUNTER
Echo available as well, appears Pt is in Afib during study. Hx of Paroxysmal Afib and currently on Xarelto. 7 day monitor placed 10/17. Will notify provider. -ROBBY

## 2023-10-19 NOTE — TELEPHONE ENCOUNTER
Spoke with Pt regarding recommendations from Dr. Salazar. Pt in agreement, message sent to scheduling-ROBBY    From: Nino Salazar MD  Sent: 10/18/2023   5:29 PM CDT  To: Sagrario Zabala,    Would recommend he be evaluated in A-fib clinic.    Thanks,    Nino  ----- Message -----  From: Sagrario Rossi  Sent: 10/18/2023   9:02 AM CDT  To: Nino Salazar MD    ----- Message from Sagrario Rossi sent at 10/18/2023  9:02 AM CDT -----  Hi Dr. Martin Oviedo alerts for your review, thank you-ROBBY      Echocardiogram was unremarkable which is comforting.  Recommend a new CPAP mask which I know he is working on.

## 2023-10-25 PROCEDURE — 93272 ECG/REVIEW INTERPRET ONLY: CPT | Performed by: INTERNAL MEDICINE

## 2023-12-20 ENCOUNTER — LAB REQUISITION (OUTPATIENT)
Dept: LAB | Facility: CLINIC | Age: 68
End: 2023-12-20
Payer: COMMERCIAL

## 2023-12-20 ENCOUNTER — TRANSFERRED RECORDS (OUTPATIENT)
Dept: HEALTH INFORMATION MANAGEMENT | Facility: CLINIC | Age: 68
End: 2023-12-20

## 2023-12-20 DIAGNOSIS — E78.2 MIXED HYPERLIPIDEMIA: ICD-10-CM

## 2023-12-20 DIAGNOSIS — I10 ESSENTIAL (PRIMARY) HYPERTENSION: ICD-10-CM

## 2023-12-20 DIAGNOSIS — Z12.5 ENCOUNTER FOR SCREENING FOR MALIGNANT NEOPLASM OF PROSTATE: ICD-10-CM

## 2023-12-20 LAB — HGB BLD-MCNC: 17 G/DL (ref 13.3–17.7)

## 2023-12-20 PROCEDURE — G0103 PSA SCREENING: HCPCS | Mod: ORL | Performed by: FAMILY MEDICINE

## 2023-12-20 PROCEDURE — 85018 HEMOGLOBIN: CPT | Mod: ORL | Performed by: FAMILY MEDICINE

## 2023-12-20 PROCEDURE — 80061 LIPID PANEL: CPT | Mod: ORL | Performed by: FAMILY MEDICINE

## 2023-12-20 PROCEDURE — 80048 BASIC METABOLIC PNL TOTAL CA: CPT | Mod: ORL | Performed by: FAMILY MEDICINE

## 2023-12-21 LAB
ANION GAP SERPL CALCULATED.3IONS-SCNC: 11 MMOL/L (ref 7–15)
BUN SERPL-MCNC: 20.5 MG/DL (ref 8–23)
CALCIUM SERPL-MCNC: 9.5 MG/DL (ref 8.8–10.2)
CHLORIDE SERPL-SCNC: 101 MMOL/L (ref 98–107)
CHOLEST SERPL-MCNC: 141 MG/DL
CREAT SERPL-MCNC: 0.89 MG/DL (ref 0.67–1.17)
DEPRECATED HCO3 PLAS-SCNC: 28 MMOL/L (ref 22–29)
EGFRCR SERPLBLD CKD-EPI 2021: >90 ML/MIN/1.73M2
FASTING STATUS PATIENT QL REPORTED: ABNORMAL
GLUCOSE SERPL-MCNC: 120 MG/DL (ref 70–99)
HDLC SERPL-MCNC: 38 MG/DL
LDLC SERPL CALC-MCNC: 86 MG/DL
NONHDLC SERPL-MCNC: 103 MG/DL
POTASSIUM SERPL-SCNC: 4.2 MMOL/L (ref 3.4–5.3)
PSA SERPL DL<=0.01 NG/ML-MCNC: 0.59 NG/ML (ref 0–4.5)
SODIUM SERPL-SCNC: 140 MMOL/L (ref 135–145)
TRIGL SERPL-MCNC: 85 MG/DL

## 2024-01-02 ENCOUNTER — TELEPHONE (OUTPATIENT)
Dept: CARDIOLOGY | Facility: CLINIC | Age: 69
End: 2024-01-02

## 2024-01-02 ENCOUNTER — OFFICE VISIT (OUTPATIENT)
Dept: CARDIOLOGY | Facility: CLINIC | Age: 69
End: 2024-01-02
Payer: COMMERCIAL

## 2024-01-02 VITALS
SYSTOLIC BLOOD PRESSURE: 100 MMHG | HEART RATE: 99 BPM | HEIGHT: 70 IN | DIASTOLIC BLOOD PRESSURE: 60 MMHG | RESPIRATION RATE: 16 BRPM | BODY MASS INDEX: 32.35 KG/M2 | WEIGHT: 226 LBS

## 2024-01-02 DIAGNOSIS — I25.10 ATHEROSCLEROSIS OF NATIVE CORONARY ARTERY OF NATIVE HEART WITHOUT ANGINA PECTORIS: ICD-10-CM

## 2024-01-02 DIAGNOSIS — G47.33 OSA ON CPAP: ICD-10-CM

## 2024-01-02 DIAGNOSIS — I48.19 PERSISTENT ATRIAL FIBRILLATION (H): Primary | ICD-10-CM

## 2024-01-02 DIAGNOSIS — I10 BENIGN ESSENTIAL HYPERTENSION: ICD-10-CM

## 2024-01-02 PROCEDURE — 99215 OFFICE O/P EST HI 40 MIN: CPT | Performed by: NURSE PRACTITIONER

## 2024-01-02 PROCEDURE — 99417 PROLNG OP E/M EACH 15 MIN: CPT | Performed by: NURSE PRACTITIONER

## 2024-01-02 RX ORDER — SOTALOL HYDROCHLORIDE 80 MG/1
80 TABLET ORAL 2 TIMES DAILY
Qty: 180 TABLET | Refills: 3 | Status: SHIPPED | OUTPATIENT
Start: 2024-01-02 | End: 2024-02-21 | Stop reason: DRUGHIGH

## 2024-01-02 RX ORDER — FLUOROURACIL 50 MG/G
CREAM TOPICAL DAILY
COMMUNITY
Start: 2023-12-20 | End: 2024-09-10

## 2024-01-02 NOTE — H&P (VIEW-ONLY)
HEART CARE ELECTROPHYSIOLOGY CONSULTATON NOTE      [facility]  [phone]    Thank you, Dr. Salazar, for asking the [facility] Electrophysiology team to see Mr. Reggie Roper to evaluate atrial fibrillation.    Assessment/Recommendations   Assessment/Plan:  1.  Persistent Atrial Fibrillation: Recurrent A-fib documented in October 2023.  No acute awareness of arrhythmia, so onset is unknown, however echo did not show significant left atrial enlargement.  We reviewed the physiology and natural progression of atrial fibrillation and treatment options including rate control versus rhythm control with medications or ablation.  Recommend pursuing rhythm control with antiarrhythmic medication initiation prior to cardioversion.  We discussed the cardioversion procedure, risks, and expected recovery.  He states that his questions were answered to his satisfaction and he wishes to proceed.    He was reassured that atrial fibrillation is not life-threatening, but carries an increased risk for stroke.  He has a [id number] score of 6 for age 65-74, HTN, CVA, vascular disease, DM2.  Continue Xarelto 20 mg daily for stroke prophylaxis.  Shelbiana, he is not taking this at the same time each day, varying from morning to evening, and does not take it with food.  Discussed the importance of taking Xarelto with a full meal at the same time each day to ensure proper absorption.    Discussed doing cardioversion in 3+ weeks versus transesophageal echo prior to cardioversion, so can be done earlier, particularly as he has likely had RVR for several months.  He would like to discuss this with his daughter who is a nurse, then will call with his decision.    -- Plan for cardioversion (in 3 weeks vs KESHA/DCCV)  -- 2 days prior to cardioversion, start sotalol 80 mg twice daily and discontinue metoprolol    2.  Hypertension: Initially hypotensive today, though asymptomatic.  Blood pressures at the lower  end of target on recheck.  Reevaluate with medication changes as above.    3.  Coronary artery disease: Nonobstructive per angiography in 2014.  Denies anginal symptoms.  Sinew statin.    4.  Obstructive sleep apnea: He had not been using CPAP, but has now gotten a new machine and has started using it again.  Discussed the correlation between untreated sleep apnea and atrial arrhythmias.  Strongly recommended compliance with CPAP therapy.    Follow up when he returns from [place]     History of Present Illness/Subjective    HPI: Reggie Roper is a 68 year old male who comes in today for EP consultation of atrial fibrillation.  I last saw him in October 2017.  He has a history of atrial fibrillation, nonobstructive coronary artery disease, CVA, carotid artery disease s/p left CEA, hypertension, type 2 diabetes, JAVON on CPAP.    Arrhythmia history  Dx/date: Paroxysmal atrial fibrillation diagnosed 8/26/2017.  Recurrent AF documented October 2023-asymptomatic.  Sx: Sluggish, lightheaded sensation  [id number] score: 6 for age 65-74, HTN, CVA, vascular disease, DM2  Oral anticoagulation: Xarelto 20 mg daily  Antiarrhythmic medications, AV philippe blocking agents: Medication options limited due to tendency for sinus bradycardia  Procedures  DCCV: N/A  Ablation: N/A    Al states that he feels well.  He has some mild fatigue and dyspnea on exertion which has developed over the past year.  He has no specific awareness of arrhythmia.  He denies chest discomfort, palpitations, abdominal fullness/bloating or peripheral edema, shortness of breath at rest, paroxysmal nocturnal dyspnea, orthopnea, lightheadedness, dizziness, pre-syncope, or syncope.  He has gotten a new CPAP machine and has started using it again.    Cardiographics (EKG personally reviewed):  EKG done 10/7/2021 shows sinus bradycardia at 51 bpm, QRS 94 ms, QT/QTc 452/460 ms    Cardiac event monitor worn 10/17/2023 to 10/23/2023 shows persistent atrial fibrillation  "with rapid ventricular response, average ventricular rate of 122 bpm and a range of 66 to 180 bpm.  No significant bradycardia or pauses.  No significant ventricular ectopy.    ECHO done 10/17/2023:  1. Technically difficult study.  2. Normal left ventricular size and systolic performance with a visually  estimated ejection fraction of 50-55%.  3. There is mild aortic valve sclerosis without significant stenosis.  4. Probable normal right ventricular size with low normal right ventricular  systolic performance (the right ventricle is poorly visualized on the study).  5. There is mild left atrial enlargement.    Coronary angiogram done 6/23/2014:   Calculated LV ejection fraction is 68 %.      Arterial access obtained via right femoral artery.   Right dominant coronary arterial system.   The LAD coronary artery exhibits mild disease in the proximal   - mid segment.   The LCX coronary artery exhibits mild disease in the proximal   segment.   The right coronary artery exhibits mild disease in the mid   segment.      FFR study was performed on the LAD artery because of a   positive Stress test suggesting this lesion in the LAD was   significant.   Fractional flow reserve was assessed by intravenous infusion   of adenosine.   The coronary flow reserve of .92 as obtained at baseline in   the LAD artery.   The coronary flow reserve of .82 as obtained post-adenosine in   the LAD artery.    I have reviewed and updated the patient's Past Medical History, Social History, Family History and Medication List.     Physical Examination  Review of Systems   Vitals: /60 (BP Location: Left arm, Patient Position: Sitting, Cuff Size: Adult Regular)   Pulse 99   Resp 16   Ht 1.778 m (5' 10\")   Wt 102.5 kg (226 lb)   BMI 32.43 kg/m    BMI= Body mass index is 32.43 kg/m .  Wt Readings from Last 3 Encounters:   01/02/24 102.5 kg (226 lb)   10/02/23 103 kg (227 lb)   09/26/22 99.8 kg (220 lb)       General Appearance:   Alert, " well-appearing and in no acute distress.   HEENT: Atraumatic, normocephalic.  No scleral icterus, normal conjunctivae, EOMs intact, PERRL.  Mucous membranes pink and moist.     Chest/Lungs:   Chest symmetric, spine straight.  Respirations unlabored.  Lungs are clear to auscultation.   Cardiovascular:   Irregularly irregular rate and rhythm.  Normal first and second heart sounds with no murmurs, rubs, or gallops; radial and posterior tibial pulses are intact, trace ankle edema.   Abdomen:  Soft, nondistended, bowel sounds present.   Extremities: No cyanosis or clubbing.   Musculoskeletal: Moves all extremities.     Skin: Warm, dry, intact.    Neurologic: Mood and affect are appropriate.  Alert and oriented to person, place, time, and situation.     ROS: 10 point ROS neg other than the symptoms noted above in the HPI.        Medical History  Surgical History Family History Social History   Past Medical History:   Diagnosis Date    Abnormal stress test     Chest pain     Midsternal Pain    Diabetes mellitus (H)     Hyperlipidemia     Hypertension     Left carotid artery stenosis 1/19/2022    Shortness of breath on exertion      Past Surgical History:   Procedure Laterality Date    CATARACT EXTRACTION      ENDARTERECTOMY CAROTID Left 10/9/2021    Procedure: LEFT CAROTID ENDARTERECTOMY;  Surgeon: Jaycob Mayo MD;  Location:  OR    LARYNGOSCOPY, FLEXIBLE WITH INJECTION N/A 12/9/2021    Procedure: LARYNGOSCOPY, FLEXIBLE WITH INJECTION;  Surgeon: Ophelia Ngo MD;  Location: [facility] OR     Family History   Problem Relation Age of Onset    Heart Disease Father     CABG Father 78.00        Social History     Socioeconomic History    Marital status:      Spouse name: Not on file    Number of children: Not on file    Years of education: Not on file    Highest education level: Not on file   Occupational History    Not on file   Tobacco Use    Smoking status: Never    Smokeless tobacco: Never    Substance and Sexual Activity    Alcohol use: Yes     Comment: Alcoholic Drinks/day: occasional    Drug use: No    Sexual activity: Not on file   Other Topics Concern    Parent/sibling w/ CABG, MI or angioplasty before 65F 55M? Not Asked   Social History Narrative    Not on file     Social Determinants of Health     Financial Resource Strain: Not on file   Food Insecurity: Not on file   Transportation Needs: Not on file   Physical Activity: Not on file   Stress: Not on file   Social Connections: Not on file   Interpersonal Safety: Not on file   Housing Stability: Not on file           Medications  Allergies   Current Outpatient Medications   Medication Sig Dispense Refill    amoxicillin (AMOXIL) 500 MG tablet Take 4 tabs (to equal 2,000 mg) 1 hour prior to dental procedure. 4 tablet 5    atorvastatin (LIPITOR) 80 MG tablet 1 tablet (80 mg) by Oral or NG Tube route daily 30 tablet 0    blood glucose (RELION GLUCOSE TEST STRIPS) test strip by In Vitro route daily Use to test blood sugar 1 times daily or as directed.      dapagliflozin (FARXIGA) 10 MG TABS tablet Take 10 mg by mouth daily      fluorouracil (EFUDEX) 5 % external cream Apply topically daily      Glucose Blood (RELION BLOOD GLUCOSE TEST VI) 1 each by In Vitro route daily      metFORMIN (GLUCOPHAGE) 500 MG tablet Take 1,500 mg by mouth daily      metoprolol succinate ER (TOPROL-XL) 100 MG 24 hr tablet Take 100 mg by mouth daily      nitroGLYcerin (NITROSTAT) 0.4 MG sublingual tablet Place 0.4 mg under the tongue every 5 minutes as needed for chest pain For chest pain place 1 tablet under the tongue every 5 minutes for 3 doses. If symptoms persist 5 minutes after 1st dose call 911.      omeprazole (PRILOSEC) 20 MG capsule [OMEPRAZOLE (PRILOSEC) 20 MG CAPSULE] Take 20 mg by mouth daily.      rivaroxaban ANTICOAGULANT (XARELTO) 20 MG TABS tablet Take 1 tablet (20 mg) by mouth daily (with dinner) 90 tablet 1    sitagliptin (JANUVIA) 100 MG tablet Take 100 mg  "by mouth daily      sotalol (BETAPACE) 80 MG tablet Take 1 tablet (80 mg) by mouth 2 times daily 180 tablet 3    TEKTURNA 150 mg tablet [TEKTURNA 150 MG TABLET] TAKE 1 TABLET BY MOUTH ONCE DAILY FOR BLOOD PRESSURE 90 tablet 3       Allergies   Allergen Reactions    Amlodipine      Other reaction(s): Edema, leg swelling at high dose  Leg swelling at high doses      Hydrochlorothiazide      Other reaction(s): hypokalemia, Hypokalemia    Lisinopril Cough     Other reaction(s): cough    Losartan      Other reaction(s): face flushing, Flushing          Lab Results    Chemistry/lipid CBC Cardiac Enzymes/BNP/TSH/INR   Recent Labs   Lab Test 12/20/23  1434   CHOL 141   HDL 38*   LDL 86   TRIG 85     Recent Labs   Lab Test 12/20/23  1434 12/21/22  0928 12/30/21  1127   LDL 86 56 59     Recent Labs   Lab Test 12/20/23  1434      POTASSIUM 4.2   CHLORIDE 101   CO2 28   *   BUN 20.5   CR 0.89   GFRESTIMATED >90   JESSICA 9.5     Recent Labs   Lab Test 12/20/23  1434 03/30/23  1007 09/21/22  0810   CR 0.89 0.85 0.83     Recent Labs   Lab Test 10/07/21  2125   A1C 8.2*      Recent Labs   Lab Test 12/20/23  1434 10/09/21  0621   WBC  --  5.9   HGB 17.0 14.8   HCT  --  44.2   MCV  --  94   PLT  --  141*     Recent Labs   Lab Test 12/20/23  1434 10/09/21  0621 10/07/21  2125   HGB 17.0 14.8 16.2    No results for input(s): \"TROPONINI\" in the last 45676 hours.  No results for input(s): \"BNP\", \"NTBNPI\", \"NTBNP\" in the last 48324 hours.  Recent Labs   Lab Test 10/07/21  2125   TSH 1.79     No results for input(s): \"INR\" in the last 30558 hours.   79 minutes were spent on the date of encounter performing chart review, history and exam, documentation, and further activities as noted above.                                       "

## 2024-01-02 NOTE — TELEPHONE ENCOUNTER
Maddi Shannon, MARIA GUADALUPE CNP  P [facility] Ep Support Pool - Lhe  DCCV in 3+ weeks vs KESHA/DCCV.  He would like to speak with his daughter before making a decision, then will call.  Start sotalol 2 days prior to DCCV and discontinue metoprolol.  Maddi Taylor

## 2024-01-02 NOTE — Clinical Note
DCCV in 3+ weeks vs KESHA/DCCV.  He would like to speak with his daughter before making a decision, then will call.  Start sotalol 2 days prior to DCCV and discontinue metoprolol. Thanks, Maddi

## 2024-01-02 NOTE — LETTER
1/2/2024    Simin Sanderson MD  [address] [facility] [address]    RE: Reggie Roper       Dear Colleague,     I had the [facility] of seeing Reg[facility].    HEART CARE ELECTROPHYSIOLOGY CONSULTATON NOTE      [facility]  [phone]    Thank you, Dr. Salazar, for asking the [facility] Electrophysiology team to see Mr. Reggie Roper to evaluate atrial fibrillation.    Assessment/Recommendations   Assessment/Plan:  1.  Persistent Atrial Fibrillation: Recurrent A-fib documented in October 2023.  No acute awareness of arrhythmia, so onset is unknown, however echo did not show significant left atrial enlargement.  We reviewed the physiology and natural progression of atrial fibrillation and treatment options including rate control versus rhythm control with medications or ablation.  Recommend pursuing rhythm control with antiarrhythmic medication initiation prior to cardioversion.  We discussed the cardioversion procedure, risks, and expected recovery.  He states that his questions were answered to his satisfaction and he wishes to proceed.    He was reassured that atrial fibrillation is not life-threatening, but carries an increased risk for stroke.  He has a [id number] score of 6 for age 65-74, HTN, CVA, vascular disease, DM2.  Continue Xarelto 20 mg daily for stroke prophylaxis.  [place], he is not taking this at the same time each day, varying from morning to evening, and does not take it with food.  Discussed the importance of taking Xarelto with a full meal at the same time each day to ensure proper absorption.    Discussed doing cardioversion in 3+ weeks versus transesophageal echo prior to cardioversion, so can be done earlier, particularly as he has likely had RVR for several months.  He would like to discuss this with his daughter who is a nurse, then will call with his decision.    -- Plan for cardioversion (in 3 weeks vs  KESHA/DCCV)  -- 2 days prior to cardioversion, start sotalol 80 mg twice daily and discontinue metoprolol    2.  Hypertension: Initially hypotensive today, though asymptomatic.  Blood pressures at the lower end of target on recheck.  Reevaluate with medication changes as above.    3.  Coronary artery disease: Nonobstructive per angiography in 2014.  Denies anginal symptoms.  Sinew statin.    4.  Obstructive sleep apnea: He had not been using CPAP, but has now gotten a new machine and has started using it again.  Discussed the correlation between untreated sleep apnea and atrial arrhythmias.  Strongly recommended compliance with CPAP therapy.    Follow up when he returns from [place]     History of Present Illness/Subjective    HPI: Reggie Roper is a 68 year old male who comes in today for EP consultation of atrial fibrillation.  I last saw him in October 2017.  He has a history of atrial fibrillation, nonobstructive coronary artery disease, CVA, carotid artery disease s/p left CEA, hypertension, type 2 diabetes, JAVON on CPAP.    Arrhythmia history  Dx/date: Paroxysmal atrial fibrillation diagnosed 8/26/2017.  Recurrent AF documented October 2023-asymptomatic.  Sx: Sluggish, lightheaded sensation  [id number] score: 6 for age 65-74, HTN, CVA, vascular disease, DM2  Oral anticoagulation: Xarelto 20 mg daily  Antiarrhythmic medications, AV philippe blocking agents: Medication options limited due to tendency for sinus bradycardia  Procedures  DCCV: N/A  Ablation: N/A    Al states that he feels well.  He has some mild fatigue and dyspnea on exertion which has developed over the past year.  He has no specific awareness of arrhythmia.  He denies chest discomfort, palpitations, abdominal fullness/bloating or peripheral edema, shortness of breath at rest, paroxysmal nocturnal dyspnea, orthopnea, lightheadedness, dizziness, pre-syncope, or syncope.  He has gotten a new CPAP machine and has started using it again.    Cardiographics  "(EKG personally reviewed):  EKG done 10/7/2021 shows sinus bradycardia at 51 bpm, QRS 94 ms, QT/QTc 452/460 ms    Cardiac event monitor worn 10/17/2023 to 10/23/2023 shows persistent atrial fibrillation with rapid ventricular response, average ventricular rate of 122 bpm and a range of 66 to 180 bpm.  No significant bradycardia or pauses.  No significant ventricular ectopy.    ECHO done 10/17/2023:  1. Technically difficult study.  2. Normal left ventricular size and systolic performance with a visually  estimated ejection fraction of 50-55%.  3. There is mild aortic valve sclerosis without significant stenosis.  4. Probable normal right ventricular size with low normal right ventricular  systolic performance (the right ventricle is poorly visualized on the study).  5. There is mild left atrial enlargement.    Coronary angiogram done 6/23/2014:   Calculated LV ejection fraction is 68 %.      Arterial access obtained via right femoral artery.   Right dominant coronary arterial system.   The LAD coronary artery exhibits mild disease in the proximal   - mid segment.   The LCX coronary artery exhibits mild disease in the proximal   segment.   The right coronary artery exhibits mild disease in the mid   segment.      FFR study was performed on the LAD artery because of a   positive Stress test suggesting this lesion in the LAD was   significant.   Fractional flow reserve was assessed by intravenous infusion   of adenosine.   The coronary flow reserve of .92 as obtained at baseline in   the LAD artery.   The coronary flow reserve of .82 as obtained post-adenosine in   the LAD artery.    I have reviewed and updated the patient's Past Medical History, Social History, Family History and Medication List.     Physical Examination  Review of Systems   Vitals: /60 (BP Location: Left arm, Patient Position: Sitting, Cuff Size: Adult Regular)   Pulse 99   Resp 16   Ht 1.778 m (5' 10\")   Wt 102.5 kg (226 lb)   BMI 32.43 " kg/m    BMI= Body mass index is 32.43 kg/m .  Wt Readings from Last 3 Encounters:   01/02/24 102.5 kg (226 lb)   10/02/23 103 kg (227 lb)   09/26/22 99.8 kg (220 lb)       General Appearance:   Alert, well-appearing and in no acute distress.   HEENT: Atraumatic, normocephalic.  No scleral icterus, normal conjunctivae, EOMs intact, PERRL.  Mucous membranes pink and moist.     Chest/Lungs:   Chest symmetric, spine straight.  Respirations unlabored.  Lungs are clear to auscultation.   Cardiovascular:   Irregularly irregular rate and rhythm.  Normal first and second heart sounds with no murmurs, rubs, or gallops; radial and posterior tibial pulses are intact, trace ankle edema.   Abdomen:  Soft, nondistended, bowel sounds present.   Extremities: No cyanosis or clubbing.   Musculoskeletal: Moves all extremities.     Skin: Warm, dry, intact.    Neurologic: Mood and affect are appropriate.  Alert and oriented to person, place, time, and situation.     ROS: 10 point ROS neg other than the symptoms noted above in the HPI.        Medical History  Surgical History Family History Social History   Past Medical History:   Diagnosis Date    Abnormal stress test     Chest pain     Midsternal Pain    Diabetes mellitus (H)     Hyperlipidemia     Hypertension     Left carotid artery stenosis 1/19/2022    Shortness of breath on exertion      Past Surgical History:   Procedure Laterality Date    CATARACT EXTRACTION      ENDARTERECTOMY CAROTID Left 10/9/2021    Procedure: LEFT CAROTID ENDARTERECTOMY;  Surgeon: Jaycob Mayo MD;  Location:  OR    LARYNGOSCOPY, FLEXIBLE WITH INJECTION N/A 12/9/2021    Procedure: LARYNGOSCOPY, FLEXIBLE WITH INJECTION;  Surgeon: Ophelia Ngo MD;  [place] [facility] [place]     Family History   Problem Relation Age of Onset    Heart Disease Father     CABG Father 78.00        Social History     Socioeconomic History    Marital status:      Spouse name: Not on file    Number of children:  Not on file    Years of education: Not on file    Highest education level: Not on file   Occupational History    Not on file   Tobacco Use    Smoking status: Never    Smokeless tobacco: Never   Substance and Sexual Activity    Alcohol use: Yes     Comment: Alcoholic Drinks/day: occasional    Drug use: No    Sexual activity: Not on file   Other Topics Concern    Parent/sibling w/ CABG, MI or angioplasty before 65F 55M? Not Asked   Social History Narrative    Not on file     Social Determinants of Health     Financial Resource Strain: Not on file   Food Insecurity: Not on file   Transportation Needs: Not on file   Physical Activity: Not on file   Stress: Not on file   Social Connections: Not on file   Interpersonal Safety: Not on file   Housing Stability: Not on file           Medications  Allergies   Current Outpatient Medications   Medication Sig Dispense Refill    amoxicillin (AMOXIL) 500 MG tablet Take 4 tabs (to equal 2,000 mg) 1 hour prior to dental procedure. 4 tablet 5    atorvastatin (LIPITOR) 80 MG tablet 1 tablet (80 mg) by Oral or NG Tube route daily 30 tablet 0    blood glucose (RELION GLUCOSE TEST STRIPS) test strip by In Vitro route daily Use to test blood sugar 1 times daily or as directed.      dapagliflozin (FARXIGA) 10 MG TABS tablet Take 10 mg by mouth daily      fluorouracil (EFUDEX) 5 % external cream Apply topically daily      Glucose Blood (RELION BLOOD GLUCOSE TEST VI) 1 each by In Vitro route daily      metFORMIN (GLUCOPHAGE) 500 MG tablet Take 1,500 mg by mouth daily      metoprolol succinate ER (TOPROL-XL) 100 MG 24 hr tablet Take 100 mg by mouth daily      nitroGLYcerin (NITROSTAT) 0.4 MG sublingual tablet Place 0.4 mg under the tongue every 5 minutes as needed for chest pain For chest pain place 1 tablet under the tongue every 5 minutes for 3 doses. If symptoms persist 5 minutes after 1st dose call 911.      omeprazole (PRILOSEC) 20 MG capsule [OMEPRAZOLE (PRILOSEC) 20 MG CAPSULE] Take 20  "mg by mouth daily.      rivaroxaban ANTICOAGULANT (XARELTO) 20 MG TABS tablet Take 1 tablet (20 mg) by mouth daily (with dinner) 90 tablet 1    sitagliptin (JANUVIA) 100 MG tablet Take 100 mg by mouth daily      sotalol (BETAPACE) 80 MG tablet Take 1 tablet (80 mg) by mouth 2 times daily 180 tablet 3    TEKTURNA 150 mg tablet [TEKTURNA 150 MG TABLET] TAKE 1 TABLET BY MOUTH ONCE DAILY FOR BLOOD PRESSURE 90 tablet 3       Allergies   Allergen Reactions    Amlodipine      Other reaction(s): Edema, leg swelling at high dose  Leg swelling at high doses      Hydrochlorothiazide      Other reaction(s): hypokalemia, Hypokalemia    Lisinopril Cough     Other reaction(s): cough    Losartan      Other reaction(s): face flushing, Flushing          Lab Results    Chemistry/lipid CBC Cardiac Enzymes/BNP/TSH/INR   Recent Labs   Lab Test 12/20/23  1434   CHOL 141   HDL 38*   LDL 86   TRIG 85     Recent Labs   Lab Test 12/20/23  1434 12/21/22  0928 12/30/21  1127   LDL 86 56 59     Recent Labs   Lab Test 12/20/23  1434      POTASSIUM 4.2   CHLORIDE 101   CO2 28   *   BUN 20.5   CR 0.89   GFRESTIMATED >90   JESSICA 9.5     Recent Labs   Lab Test 12/20/23  1434 03/30/23  1007 09/21/22  0810   CR 0.89 0.85 0.83     Recent Labs   Lab Test 10/07/21  2125   A1C 8.2*      Recent Labs   Lab Test 12/20/23  1434 10/09/21  0621   WBC  --  5.9   HGB 17.0 14.8   HCT  --  44.2   MCV  --  94   PLT  --  141*     Recent Labs   Lab Test 12/20/23  1434 10/09/21  0621 10/07/21  2125   HGB 17.0 14.8 16.2    No results for input(s): \"TROPONINI\" in the last 21553 hours.  No results for input(s): \"BNP\", \"NTBNPI\", \"NTBNP\" in the last 88643 hours.  Recent Labs   Lab Test 10/07/21  2125   TSH 1.79     No results for input(s): \"INR\" in the last 92975 hours.   79 minutes were spent on the date of encounter performing chart review, history and exam, documentation, and further activities as noted above.          Thank you for allowing me to participate " in the care of your patient.      Sincerely,     MARIA GUADALUPE Grier [facility]  cc:   Nino Salazar MD  [address]  [place] [address]

## 2024-01-02 NOTE — PROGRESS NOTES
HEART CARE ELECTROPHYSIOLOGY CONSULTATON NOTE      [facility]  [phone]    Thank you, Dr. Salazar, for asking the [facility] Electrophysiology team to see Mr. Reggie Roper to evaluate atrial fibrillation.    Assessment/Recommendations   Assessment/Plan:  1.  Persistent Atrial Fibrillation: Recurrent A-fib documented in October 2023.  No acute awareness of arrhythmia, so onset is unknown, however echo did not show significant left atrial enlargement.  We reviewed the physiology and natural progression of atrial fibrillation and treatment options including rate control versus rhythm control with medications or ablation.  Recommend pursuing rhythm control with antiarrhythmic medication initiation prior to cardioversion.  We discussed the cardioversion procedure, risks, and expected recovery.  He states that his questions were answered to his satisfaction and he wishes to proceed.    He was reassured that atrial fibrillation is not life-threatening, but carries an increased risk for stroke.  He has a [id number] score of 6 for age 65-74, HTN, CVA, vascular disease, DM2.  Continue Xarelto 20 mg daily for stroke prophylaxis.  [place], he is not taking this at the same time each day, varying from morning to evening, and does not take it with food.  Discussed the importance of taking Xarelto with a full meal at the same time each day to ensure proper absorption.    Discussed doing cardioversion in 3+ weeks versus transesophageal echo prior to cardioversion, so can be done earlier, particularly as he has likely had RVR for several months.  He would like to discuss this with his daughter who is a nurse, then will call with his decision.    -- Plan for cardioversion (in 3 weeks vs KESHA/DCCV)  -- 2 days prior to cardioversion, start sotalol 80 mg twice daily and discontinue metoprolol    2.  Hypertension: Initially hypotensive today, though asymptomatic.  Blood pressures at the lower  end of target on recheck.  Reevaluate with medication changes as above.    3.  Coronary artery disease: Nonobstructive per angiography in 2014.  Denies anginal symptoms.  Sinew statin.    4.  Obstructive sleep apnea: He had not been using CPAP, but has now gotten a new machine and has started using it again.  Discussed the correlation between untreated sleep apnea and atrial arrhythmias.  Strongly recommended compliance with CPAP therapy.    Follow up when he returns from [place]     History of Present Illness/Subjective    HPI: Reggie Roper is a 68 year old male who comes in today for EP consultation of atrial fibrillation.  I last saw him in October 2017.  He has a history of atrial fibrillation, nonobstructive coronary artery disease, CVA, carotid artery disease s/p left CEA, hypertension, type 2 diabetes, JAVON on CPAP.    Arrhythmia history  Dx/date: Paroxysmal atrial fibrillation diagnosed 8/26/2017.  Recurrent AF documented October 2023-asymptomatic.  Sx: Sluggish, lightheaded sensation  [id number] score: 6 for age 65-74, HTN, CVA, vascular disease, DM2  Oral anticoagulation: Xarelto 20 mg daily  Antiarrhythmic medications, AV philippe blocking agents: Medication options limited due to tendency for sinus bradycardia  Procedures  DCCV: N/A  Ablation: N/A    Al states that he feels well.  He has some mild fatigue and dyspnea on exertion which has developed over the past year.  He has no specific awareness of arrhythmia.  He denies chest discomfort, palpitations, abdominal fullness/bloating or peripheral edema, shortness of breath at rest, paroxysmal nocturnal dyspnea, orthopnea, lightheadedness, dizziness, pre-syncope, or syncope.  He has gotten a new CPAP machine and has started using it again.    Cardiographics (EKG personally reviewed):  EKG done 10/7/2021 shows sinus bradycardia at 51 bpm, QRS 94 ms, QT/QTc 452/460 ms    Cardiac event monitor worn 10/17/2023 to 10/23/2023 shows persistent atrial fibrillation  "with rapid ventricular response, average ventricular rate of 122 bpm and a range of 66 to 180 bpm.  No significant bradycardia or pauses.  No significant ventricular ectopy.    ECHO done 10/17/2023:  1. Technically difficult study.  2. Normal left ventricular size and systolic performance with a visually  estimated ejection fraction of 50-55%.  3. There is mild aortic valve sclerosis without significant stenosis.  4. Probable normal right ventricular size with low normal right ventricular  systolic performance (the right ventricle is poorly visualized on the study).  5. There is mild left atrial enlargement.    Coronary angiogram done 6/23/2014:   Calculated LV ejection fraction is 68 %.      Arterial access obtained via right femoral artery.   Right dominant coronary arterial system.   The LAD coronary artery exhibits mild disease in the proximal   - mid segment.   The LCX coronary artery exhibits mild disease in the proximal   segment.   The right coronary artery exhibits mild disease in the mid   segment.      FFR study was performed on the LAD artery because of a   positive Stress test suggesting this lesion in the LAD was   significant.   Fractional flow reserve was assessed by intravenous infusion   of adenosine.   The coronary flow reserve of .92 as obtained at baseline in   the LAD artery.   The coronary flow reserve of .82 as obtained post-adenosine in   the LAD artery.    I have reviewed and updated the patient's Past Medical History, Social History, Family History and Medication List.     Physical Examination  Review of Systems   Vitals: /60 (BP Location: Left arm, Patient Position: Sitting, Cuff Size: Adult Regular)   Pulse 99   Resp 16   Ht 1.778 m (5' 10\")   Wt 102.5 kg (226 lb)   BMI 32.43 kg/m    BMI= Body mass index is 32.43 kg/m .  Wt Readings from Last 3 Encounters:   01/02/24 102.5 kg (226 lb)   10/02/23 103 kg (227 lb)   09/26/22 99.8 kg (220 lb)       General Appearance:   Alert, " well-appearing and in no acute distress.   HEENT: Atraumatic, normocephalic.  No scleral icterus, normal conjunctivae, EOMs intact, PERRL.  Mucous membranes pink and moist.     Chest/Lungs:   Chest symmetric, spine straight.  Respirations unlabored.  Lungs are clear to auscultation.   Cardiovascular:   Irregularly irregular rate and rhythm.  Normal first and second heart sounds with no murmurs, rubs, or gallops; radial and posterior tibial pulses are intact, trace ankle edema.   Abdomen:  Soft, nondistended, bowel sounds present.   Extremities: No cyanosis or clubbing.   Musculoskeletal: Moves all extremities.     Skin: Warm, dry, intact.    Neurologic: Mood and affect are appropriate.  Alert and oriented to person, place, time, and situation.     ROS: 10 point ROS neg other than the symptoms noted above in the HPI.        Medical History  Surgical History Family History Social History   Past Medical History:   Diagnosis Date     Abnormal stress test      Chest pain     Midsternal Pain     Diabetes mellitus (H)      Hyperlipidemia      Hypertension      Left carotid artery stenosis 1/19/2022     Shortness of breath on exertion      Past Surgical History:   Procedure Laterality Date     CATARACT EXTRACTION       ENDARTERECTOMY CAROTID Left 10/9/2021    Procedure: LEFT CAROTID ENDARTERECTOMY;  Surgeon: Jaycob Mayo MD;  Location:  OR     LARYNGOSCOPY, FLEXIBLE WITH INJECTION N/A 12/9/2021    Procedure: LARYNGOSCOPY, FLEXIBLE WITH INJECTION;  Surgeon: Ophelia Ngo MD;  Location: [facility] [place]     Family History   Problem Relation Age of Onset     Heart Disease Father      CABG Father 78.00        Social History     Socioeconomic History     Marital status:      Spouse name: Not on file     Number of children: Not on file     Years of education: Not on file     Highest education level: Not on file   Occupational History     Not on file   Tobacco Use     Smoking status: Never     Smokeless  tobacco: Never   Substance and Sexual Activity     Alcohol use: Yes     Comment: Alcoholic Drinks/day: occasional     Drug use: No     Sexual activity: Not on file   Other Topics Concern     Parent/sibling w/ CABG, MI or angioplasty before 65F 55M? Not Asked   Social History Narrative     Not on file     Social Determinants of Health     Financial Resource Strain: Not on file   Food Insecurity: Not on file   Transportation Needs: Not on file   Physical Activity: Not on file   Stress: Not on file   Social Connections: Not on file   Interpersonal Safety: Not on file   Housing Stability: Not on file           Medications  Allergies   Current Outpatient Medications   Medication Sig Dispense Refill     amoxicillin (AMOXIL) 500 MG tablet Take 4 tabs (to equal 2,000 mg) 1 hour prior to dental procedure. 4 tablet 5     atorvastatin (LIPITOR) 80 MG tablet 1 tablet (80 mg) by Oral or NG Tube route daily 30 tablet 0     blood glucose (RELION GLUCOSE TEST STRIPS) test strip by In Vitro route daily Use to test blood sugar 1 times daily or as directed.       dapagliflozin (FARXIGA) 10 MG TABS tablet Take 10 mg by mouth daily       fluorouracil (EFUDEX) 5 % external cream Apply topically daily       Glucose Blood (RELION BLOOD GLUCOSE TEST VI) 1 each by In Vitro route daily       metFORMIN (GLUCOPHAGE) 500 MG tablet Take 1,500 mg by mouth daily       metoprolol succinate ER (TOPROL-XL) 100 MG 24 hr tablet Take 100 mg by mouth daily       nitroGLYcerin (NITROSTAT) 0.4 MG sublingual tablet Place 0.4 mg under the tongue every 5 minutes as needed for chest pain For chest pain place 1 tablet under the tongue every 5 minutes for 3 doses. If symptoms persist 5 minutes after 1st dose call 911.       omeprazole (PRILOSEC) 20 MG capsule [OMEPRAZOLE (PRILOSEC) 20 MG CAPSULE] Take 20 mg by mouth daily.       rivaroxaban ANTICOAGULANT (XARELTO) 20 MG TABS tablet Take 1 tablet (20 mg) by mouth daily (with dinner) 90 tablet 1     sitagliptin  "(JANUVIA) 100 MG tablet Take 100 mg by mouth daily       sotalol (BETAPACE) 80 MG tablet Take 1 tablet (80 mg) by mouth 2 times daily 180 tablet 3     TEKTURNA 150 mg tablet [TEKTURNA 150 MG TABLET] TAKE 1 TABLET BY MOUTH ONCE DAILY FOR BLOOD PRESSURE 90 tablet 3       Allergies   Allergen Reactions     Amlodipine      Other reaction(s): Edema, leg swelling at high dose  Leg swelling at high doses       Hydrochlorothiazide      Other reaction(s): hypokalemia, Hypokalemia     Lisinopril Cough     Other reaction(s): cough     Losartan      Other reaction(s): face flushing, Flushing          Lab Results    Chemistry/lipid CBC Cardiac Enzymes/BNP/TSH/INR   Recent Labs   Lab Test 12/20/23  1434   CHOL 141   HDL 38*   LDL 86   TRIG 85     Recent Labs   Lab Test 12/20/23  1434 12/21/22  0928 12/30/21  1127   LDL 86 56 59     Recent Labs   Lab Test 12/20/23  1434      POTASSIUM 4.2   CHLORIDE 101   CO2 28   *   BUN 20.5   CR 0.89   GFRESTIMATED >90   JESSICA 9.5     Recent Labs   Lab Test 12/20/23  1434 03/30/23  1007 09/21/22  0810   CR 0.89 0.85 0.83     Recent Labs   Lab Test 10/07/21  2125   A1C 8.2*      Recent Labs   Lab Test 12/20/23  1434 10/09/21  0621   WBC  --  5.9   HGB 17.0 14.8   HCT  --  44.2   MCV  --  94   PLT  --  141*     Recent Labs   Lab Test 12/20/23  1434 10/09/21  0621 10/07/21  2125   HGB 17.0 14.8 16.2    No results for input(s): \"TROPONINI\" in the last 02135 hours.  No results for input(s): \"BNP\", \"NTBNPI\", \"NTBNP\" in the last 69538 hours.  Recent Labs   Lab Test 10/07/21  2125   TSH 1.79     No results for input(s): \"INR\" in the last 20244 hours.   79 minutes were spent on the date of encounter performing chart review, history and exam, documentation, and further activities as noted above.                                       "

## 2024-01-02 NOTE — PATIENT INSTRUCTIONS
[facility]  Cardiac Electrophysiology  [address]  [place] [address]   Office: [phone]  Fax: [phone]       Reggie Roper,    It was a pleasure to see you today at the [facility].     My recommendations after this visit include:    Wear your CPAP every night (especially after you have been drinking)    Hydrate: baseline of 60 ounce daily, more if out in the heat, exercising, having a cocktail    Take Xarelto 20 mg daily at the same time every day and with a FULL meal (dinner)    Plan for cardioversion to get you back into normal sinus rhythm  --Cardioversion in 3 weeks on/after 1/23/2024 vs KESHA (transesophageal echo)/cardioversion sooner    2 days before cardioversion, start sotalol 80 mg twice daily and stop taking metoprolol    Instructions for the day of cardioversion:  This is an outpatient procedure done at [facility].  Plan on being at [facility] for 3-4 hrs.  Nothing to eat or drink for 8 hours before your procedure.  This includes gum and/or hard candies.  Take your morning medications with sips of water.  If you take your blood thinner in the morning, please take it.  Please hold vitamins, supplements and oral diabetes medication the day of cardioversion.  You will need a .      Cardioversion will be ordered today and you will get a call from the  to schedule your cardioversion.      Follow up after you return from [place].    Maddi Shannon, CNP  [facility], Electrophysiology  [phone]  EP nurses [phone]           ATRIAL FIBRILLATION: Patient Information     What is atrial fibrillation?  Atrial fibrillation (AF, A-fib) is a common heart rhythm problem (arrhythmia) occurring within the upper chambers of the heart (the atria).  In normal rhythm, the upper and lower chambers of the heart are electrically driven to contract in a coordinated sequence.  In atrial fibrillation, the atria  lose their ability to contract because of rapid and chaotic electrical activity.  The lower chambers of the heart (the ventricles) continue to pump blood throughout the body, though with irregular and often faster rate due to the chaotic activity within the atria.          How do I know if I have atrial fibrillation?   Some people may feel their heart beating faster, harder, or irregularly while in atrial fibrillation.  Others may be lightheaded, fatigued, feel weak or tired or become more short of breath especially with activities.  Some patients have no symptoms at all.  Atrial fibrillation may be found due to an irregular pulse or on an electrocardiogram (ECG). Atrial fibrillation can start and stop on its own, and episodes can last from seconds to several months.       How common is atrial fibrillation?   An estimated 3-6 million people in the [place] have atrial fibrillation.  Atrial fibrillation is a common heart rhythm problem for older persons, affecting as estimated 12-15% of people over the age of 65 years of age.     What causes atrial fibrillation?   Age is the most important risk factor for atrial fibrillation.  Atrial fibrillation is more common in people with other heart disease, high blood pressure, diabetes, obesity, sleep apnea and in people who regularly consume alcohol.  Surgery, lung disease, or thyroid problems can lead to atrial fibrillation.  Atrial fibrillation has multiple possible causes, and in most cases a single cause cannot be found.  Atrial fibrillation is a progressive condition, usually starting with at an early stage with short and infrequent episodes.  In later stages of disease, more frequent and longer lasting episodes of atrial fibrillation occur, ultimately culminating in episodes which do not spontaneously terminate.  Generally, more enlargement and scarring within the upper chambers of the heart is observed as atrial fibrillation progresses from early to late-stage  disease.     How is atrial fibrillation diagnosed and evaluated?    Because of its start-stop nature, atrial fibrillation can be challenging to diagnose.  Atrial fibrillation is most commonly diagnosed via cardiac rhythm recordings - either an ECG or wearable cardiac rhythm monitor.  For patients with pacemakers, defibrillators or implantable loop recorders, atrial fibrillation may be recorded via these devices.  Recently, commercially available devices (eg. Apple Watch, [employer] device, certain FitBit devices, others) can allow patients to take 30 second cardiac rhythm recordings which may document atrial fibrillation.  Once atrial fibrillation is diagnosed, additional tests include blood tests and an echocardiogram.  The echocardiogram uses ultrasound to look at your heart to assess your cardiac function and evaluate for other heart disease.  Additional evaluation may include CT or MRI studies.     Is atrial fibrillation dangerous?   Atrial fibrillation is not usually a life-threatening arrhythmia.  The most serious consequences of atrial fibrillation including stroke and worsening of overall cardiac function.  While in atrial fibrillation, the upper cardiac chambers do not contract normally, resulting in slower blood flow and increased risk of clot formation.  If this blood clot becomes detached from the heart a stroke can occur.  Unfortunately, stroke can be the first sign of atrial fibrillation for some people.  With a stroke, you may notice abnormal sensation, weakness on one side of the body or face, changes in your vision or speech.  If you have any of these signs, you should contact EMS and be evaluated in an emergency room as soon as possible.       How is atrial fibrillation treated?     Several treatment options exist for suppressing atrial fibrillation - however, it is not an easily curable arrhythmia.  The first goal in managing atrial fibrillation is to minimize stroke risk.  The second goal is to  improve symptoms associated with atrial fibrillation.  Finally, in patients with reduced cardiac function, maintaining normal rhythm can help improve cardiac function.       Blood thinners are used to reduce the risk of stroke in patients with high estimated stroke risk related to atrial fibrillation.  For patients at higher risk of bleeding related to blood thinner use, implantable devices may be an option to reduce stroke risk without the need for long term blood thinner use.       Atrial fibrillation can be managed via two strategies: rate control and rhythm control.  In a rate control strategy, continued atrial fibrillation is accepted and medications (eg. beta-blockers or calcium channel blockers) are used to control the lower chamber rate.  In a rhythm control strategy, anti-arrhythmic medications or catheter ablation are used to maintain normal cardiac rhythm and slow disease progression by suppressing atrial fibrillation.  A procedure called a cardioversion, in which an electric shock is delivered through patches placed on the chest wall while under deep sedation, can be performed to temporarily restore normal cardiac rhythm, though does not address the chance of atrial fibrillation recurrence.  Treatments are more effective for earlier rather than later stage atrial fibrillation.  Lifestyle modifications (maintaining a healthy weight, aerobic exercise, diagnosing and treating sleep apnea, and minimizing alcohol intake) are important elements of atrial fibrillation rhythm control.      What is catheter ablation for atrial fibrillation?  Cardiac catheter ablation is a commonly performed, minimally invasive procedure performed by a cardiac electrophysiologist to treat many different cardiac rhythm abnormalities.  During catheter ablation, long, thin, flexible tubes are advanced into the heart via small sheaths inserted into the femoral veins and thermal energy (either heating or cooling) is applied within the  heart to disrupt abnormal electrical activity.  Atrial fibrillation ablation is performed under general anesthesia, with procedures generally taking approximately 2-3 hours.  Patients are typically observed for 3-5 hours after the ablation, and in most cases can be discharged home the same day.  Atrial fibrillation ablation is associated with better outcomes (mortality, cardiovascular hospitalizations, atrial arrhythmia recurrences) compared to antiarrhythmic drug therapy.  However, atrial fibrillation recurrences are not uncommon, and repeat catheter ablation may be offered.  Your electrophysiology team can review atrial fibrillation ablation, anticipated success rates, risks, and recovery expectations with you.     What are anti-arrhythmic medications?  Anti-arrhythmic medications are specialized drugs which alter cardiac electrical functioning to suppress arrhythmias.  There are several anti-arrhythmic medications available, each with its own success rate and side effects.  Some anti-arrhythmic medications are less effective though safer to use, others are more effective though have serious potential toxicities.  Atrial fibrillation recurrences are common and may require dose adjustment or change in antiarrhythmic therapy.  Your electrophysiology team will carefully consider which medication would be the best and safest for your particular case.       Can I live a normal life?    The goal of atrial fibrillation management is for patients to live normal lives without being limited by symptoms related to atrial fibrillation.     Are any additional educational resources available?  There are a number of excellent atrial fibrillation education resources available to you online.  A few options you may wish to review include:  hrsonline.org/guide-atrial-fibrillation  afibmatters.org  getsmartaboutafib.com  stopaf.com     What comes next?    Consider your management options and let us know how we can help in your  decision process.  Please take medications as they have been prescribed.  You should also get any tests that may have been ordered for you.       When to Call Your Doctor or seek emergency care:  Call your doctor or seek emergency care if you have any significant changes with the following:  Weakness  Dizziness  Fainting  Fatigue  Shortness of breath  Chest pain with increased activity  If you are concerned that your heart rate is too fast or too slow  Bleeding that does not stop in 10 minutes  Coughing or throwing up blood  Bloody diarrhea or bleeding hemorrhoids  Dark-colored urine or black stool  Allergic reactions:  Rash  Itching  Swelling  Trouble breathing or swallowing        Please call the [facility] at [phone] if you have concerns about your symptoms, your medicines, or your follow-up appointments.

## 2024-01-05 ENCOUNTER — PREP FOR PROCEDURE (OUTPATIENT)
Dept: CARDIOLOGY | Facility: CLINIC | Age: 69
End: 2024-01-05
Payer: COMMERCIAL

## 2024-01-05 ENCOUNTER — DOCUMENTATION ONLY (OUTPATIENT)
Dept: CARDIOLOGY | Facility: CLINIC | Age: 69
End: 2024-01-05
Payer: COMMERCIAL

## 2024-01-05 DIAGNOSIS — I48.19 PERSISTENT ATRIAL FIBRILLATION (H): Primary | ICD-10-CM

## 2024-01-05 RX ORDER — POTASSIUM CHLORIDE 1500 MG/1
20 TABLET, EXTENDED RELEASE ORAL
Status: CANCELLED | OUTPATIENT
Start: 2024-01-05

## 2024-01-05 RX ORDER — MAGNESIUM SULFATE HEPTAHYDRATE 40 MG/ML
2 INJECTION, SOLUTION INTRAVENOUS
Status: CANCELLED | OUTPATIENT
Start: 2024-01-05

## 2024-01-05 RX ORDER — POTASSIUM CHLORIDE 1500 MG/1
40 TABLET, EXTENDED RELEASE ORAL
Status: CANCELLED | OUTPATIENT
Start: 2024-01-05

## 2024-01-05 RX ORDER — LIDOCAINE 40 MG/G
CREAM TOPICAL
Status: CANCELLED | OUTPATIENT
Start: 2024-01-05

## 2024-01-05 NOTE — PROGRESS NOTES
H&P  PMD: []  Received [] Card OV: [x]  Date: 1/2 Sent LM  []  Teach  []   Orders  I [x] P  [x]  Letter []   AC: aJrelto NP Med Review: CHANGES- Start sotalol 2 days prior to DCCV and discontinue metoprolol.    DM Meds:  Metfromin Farxiga , Januvia  GLP-1:None       1955  Home:[phone] (home) Cell:[phone] (mobile)  Emergency Contact: Darrius Encarnacion   PCP: Simin Sanderson, [phone]    +++Important patient information for [facility]SC/Cath Lab staff : None+++    [facility] EP Cath Lab Procedure Order   Procedure: KESHA guided Cardioversion for AF vs Cardioversion if taking Xarelto appropriately since 1-2-24 in 3 weeks (ok after 1-23-24)  Ordering Provider: Maddi Shannon NP  Date Ordered and Prepped: 1/5/2024 Brittny Wei RN  Anticipated Case Duration:  Standard  Scheduling Needs/Timeframe:  Next Available  Scheduling Contact: Please contact pt to schedule  Cardiology Follow Up Apt s/p: Standard- EP TOÑO @ 4-6 wks or previously scheduled General Card apt  Current Device/Device Co Needed for Procedure: None NoneNone  Pre-Procedural Testing needed: None  Anesthesia:  General    [facility] EP Cath Lab Prep   H&P:  Compled by cardiology on 1/2/24 if scheduled within 30 days, pt to schedule with PMD if procedure outside of this timeframe  Pre-op Labs: K if pt taking diuretic medication or hx of low Potassium, Beta HcG if appropriate, and INR if on Warfarin will be ordered AM of procedure and Review of most recent labs, WEL for procedure  T&S Pre-Procedure Review: T&S is not required for DCCV/DFT Testing  Medical Records Pertinent for Procedure:  None  Iodinated Contrast Dye Allergies (Does not include Shellfish, Egg, and/or Iodine Allergy): NA  GLP-1 Protocol: If on Dulaglutide (Trulicity) (weekly)- Injection hold 7 days prior to procedure  , Exenatide extended release (Bydureon bcise) (weekly)- Injection hold 7 days prior to procedure, Exenatide (Byetta) (twice daily)- Oral Tablet hold day prior and morning  of procedure and for Injection hold 7 days prior to procedure, Semaglutide (Ozempic) (weekly)- Injection and Oral hold 7 days prior to procedure, Liraglutide (Victoza, Saxenda) (daily)- Injection hold day prior and morning of procedure    Allergies   Allergen Reactions    Amlodipine      Other reaction(s): Edema, leg swelling at high dose  Leg swelling at high doses      Hydrochlorothiazide      Other reaction(s): hypokalemia, Hypokalemia    Lisinopril Cough     Other reaction(s): cough    Losartan      Other reaction(s): face flushing, Flushing       Current Outpatient Medications:     amoxicillin (AMOXIL) 500 MG tablet, Take 4 tabs (to equal 2,000 mg) 1 hour prior to dental procedure., Disp: 4 tablet, Rfl: 5    atorvastatin (LIPITOR) 80 MG tablet, 1 tablet (80 mg) by Oral or NG Tube route daily, Disp: 30 tablet, Rfl: 0    blood glucose (RELION GLUCOSE TEST STRIPS) test strip, by In Vitro route daily Use to test blood sugar 1 times daily or as directed., Disp: , Rfl:     dapagliflozin (FARXIGA) 10 MG TABS tablet, Take 10 mg by mouth daily, Disp: , Rfl:     fluorouracil (EFUDEX) 5 % external cream, Apply topically daily, Disp: , Rfl:     Glucose Blood (RELION BLOOD GLUCOSE TEST VI), 1 each by In Vitro route daily, Disp: , Rfl:     metFORMIN (GLUCOPHAGE) 500 MG tablet, Take 1,500 mg by mouth daily, Disp: , Rfl:     metoprolol succinate ER (TOPROL-XL) 100 MG 24 hr tablet, Take 100 mg by mouth daily, Disp: , Rfl:     nitroGLYcerin (NITROSTAT) 0.4 MG sublingual tablet, Place 0.4 mg under the tongue every 5 minutes as needed for chest pain For chest pain place 1 tablet under the tongue every 5 minutes for 3 doses. If symptoms persist 5 minutes after 1st dose call 911., Disp: , Rfl:     omeprazole (PRILOSEC) 20 MG capsule, [OMEPRAZOLE (PRILOSEC) 20 MG CAPSULE] Take 20 mg by mouth daily., Disp: , Rfl:     rivaroxaban ANTICOAGULANT (XARELTO) 20 MG TABS tablet, Take 1 tablet (20 mg) by mouth daily (with dinner), Disp: 90  tablet, Rfl: 1    sitagliptin (JANUVIA) 100 MG tablet, Take 100 mg by mouth daily, Disp: , Rfl:     sotalol (BETAPACE) 80 MG tablet, Take 1 tablet (80 mg) by mouth 2 times daily, Disp: 180 tablet, Rfl: 3    TEKTURNA 150 mg tablet, [TEKTURNA 150 MG TABLET] TAKE 1 TABLET BY MOUTH ONCE DAILY FOR BLOOD PRESSURE, Disp: 90 tablet, Rfl: 3    Documentation Date:1/5/2024 10:50 AM  Brittny Wei RN

## 2024-01-05 NOTE — TELEPHONE ENCOUNTER
PC back to pt  Pt made the decision to proceed with KESHA DCCV after speaking to his PMD  Orders placed and sent to scheduling  1/5/2024 10:49 AM  SHERIN Orozco Dominique13 minutes ago (10:32 AM)     [facility]     Phone Message     May a detailed message be left on voicemail: yes      Reason for Call: Other: Pt called in to speak with care team about scheduling echo kesha. Please jens pt back for further discussion.      Action Taken: Other: cardiology     Travel Screening: Not Applicable     Thank you!  [facility]

## 2024-01-11 ENCOUNTER — TELEPHONE (OUTPATIENT)
Dept: CARDIOLOGY | Facility: CLINIC | Age: 69
End: 2024-01-11
Payer: COMMERCIAL

## 2024-01-11 NOTE — TELEPHONE ENCOUNTER
Pre-Procedure Education    Procedure: DCCV with Marielle Jacobs NP on 1/25/2024   with arrival time 8 30    PT IS ON XARELTO TAKES AT [employer] NO MISSED DOSES AND IS AWARE TO CALL IN IF HE DOES    COVID: COVID policy- if pt develops COVID like symptoms prior to procedure, he/she would need to complete an at home with a rapid antigen COVID test 1-2 days prior to your procedure date. If COVID + pt is aware the procedure will need to be rescheduled, and to contact CV scheduling as soon as possible    Pre-Op H&P: Completed- Available in Epic    Education:          PT HAS A  FOR PROCEDURE THAT WILL STAY WITH HIM    PT INSTRUCTED TO HOLD ANY VITAMINS MINERALS CALCIUM IRON OR SUPPLEMENTS THE MORNING OF CV  PT INSTRUCTED NO GUM CHEWING MINTS OR CANDY THE MORNING OF CV   PT INSTRUCTED TO LEAVE JEWELRY AT HOME  PT INSTRUCTED TO BATHE OR SHOWER BEFORE COMING IN  PT INSTRUCTED TO LEAVE JEWELRY AT HOME  PT IS ON XARELTO  PT INSTRUCTED PER JACLYN BOATENG CNP TO STOP HIS METOPROLOL 2 DAYS PT IS DIABETIC AND INSTRUCTED TO HOLD HIS ORAL DIABETIC  MEDICATION JANUVIA AND METFORMIN  PT IS JAVON/CPAP  PT DOES NOT HAVE A POST CV FOLLOW UP PT PLANS TO LEAVE ASAP TO GO [facility] TO [place] UNTIL JUNE    Contact:     PT IS STAYING WITH DAUGHTER FROM [place]   IN [place] ALL INSTRUCTIONS REVIEWED WITH DAUGHTER BORA AND PT AT PT'S REQUEST PT CELL     Pre-Procedure Instruction: NPO after midnight pre procedure, Defined NPO with pt, Remove all jewelry and leave all valuables at home, Shower prior to arrival, Sedation plan/orders, Transportation requirements and arrangements post procedure, Post-procedure follow up process, Post-procedure restrictions/expectations, and Pre-procedure letter sent- letter tab  Risks:      Medication:   Instructions regarding anticoagulants: Xarelto- To continue anticoagulation uninterrupted through their procedure    Instructions regarding antiarrhythmic medication: Sotalol;  PT WILL START SOTALOL 80 MG Q  12 HRS  2 DAYS PRIOR CV    Instructions given to pt regarding diuretics medication: NA for DCCV    Instructions given to pt regarding DM/GLP-1 medication:   DM-  PT WILL HOLD HIS ORAL DIABETIC MED MORNING  OF CV  GLP-1- None    Instructions for medication, other than anticoagulants and antiarrhythmics listed above, given to pt: Take all medication AM of procedure with small sips of water     Important patient information for staff:  PT IS NEW SOTALOL START PT IS JAVON PT IS DIABETIC     1/11/2024 8:54 AM  Priyanka Vu LPN

## 2024-01-25 ENCOUNTER — ANESTHESIA EVENT (OUTPATIENT)
Dept: CARDIOLOGY | Facility: HOSPITAL | Age: 69
End: 2024-01-25
Payer: COMMERCIAL

## 2024-01-25 ENCOUNTER — HOSPITAL ENCOUNTER (OUTPATIENT)
Dept: CARDIOLOGY | Facility: HOSPITAL | Age: 69
Discharge: HOME | End: 2024-01-25
Attending: NURSE PRACTITIONER | Admitting: INTERNAL MEDICINE
Payer: COMMERCIAL

## 2024-01-25 ENCOUNTER — ANESTHESIA (OUTPATIENT)
Dept: CARDIOLOGY | Facility: HOSPITAL | Age: 69
End: 2024-01-25
Payer: COMMERCIAL

## 2024-01-25 VITALS
BODY MASS INDEX: 32.35 KG/M2 | WEIGHT: 226 LBS | HEART RATE: 67 BPM | OXYGEN SATURATION: 95 % | DIASTOLIC BLOOD PRESSURE: 71 MMHG | RESPIRATION RATE: 26 BRPM | TEMPERATURE: 98.2 F | HEIGHT: 70 IN | SYSTOLIC BLOOD PRESSURE: 119 MMHG

## 2024-01-25 DIAGNOSIS — I48.19 PERSISTENT ATRIAL FIBRILLATION (H): ICD-10-CM

## 2024-01-25 LAB
ATRIAL RATE - MUSE: 77 BPM
DIASTOLIC BLOOD PRESSURE - MUSE: NORMAL MMHG
INTERPRETATION ECG - MUSE: NORMAL
P AXIS - MUSE: 71 DEGREES
PR INTERVAL - MUSE: 212 MS
QRS DURATION - MUSE: 100 MS
QT - MUSE: 436 MS
QTC - MUSE: 493 MS
R AXIS - MUSE: 75 DEGREES
SYSTOLIC BLOOD PRESSURE - MUSE: NORMAL MMHG
T AXIS - MUSE: 104 DEGREES
VENTRICULAR RATE- MUSE: 77 BPM

## 2024-01-25 PROCEDURE — 370N000003 HC ANESTHESIA WARD SERVICE: Performed by: ANESTHESIOLOGY

## 2024-01-25 PROCEDURE — 93010 ELECTROCARDIOGRAM REPORT: CPT | Performed by: INTERNAL MEDICINE

## 2024-01-25 PROCEDURE — 250N000009 HC RX 250: Performed by: NURSE ANESTHETIST, CERTIFIED REGISTERED

## 2024-01-25 PROCEDURE — 999N000054 HC STATISTIC EKG NON-CHARGEABLE

## 2024-01-25 PROCEDURE — 93005 ELECTROCARDIOGRAM TRACING: CPT

## 2024-01-25 PROCEDURE — 92960 CARDIOVERSION ELECTRIC EXT: CPT | Performed by: NURSE PRACTITIONER

## 2024-01-25 PROCEDURE — 92960 CARDIOVERSION ELECTRIC EXT: CPT

## 2024-01-25 RX ORDER — POTASSIUM CHLORIDE 1500 MG/1
40 TABLET, EXTENDED RELEASE ORAL
Status: DISCONTINUED | OUTPATIENT
Start: 2024-01-25 | End: 2024-01-25 | Stop reason: HOSPADM

## 2024-01-25 RX ORDER — MAGNESIUM SULFATE HEPTAHYDRATE 40 MG/ML
2 INJECTION, SOLUTION INTRAVENOUS
Status: DISCONTINUED | OUTPATIENT
Start: 2024-01-25 | End: 2024-01-25 | Stop reason: HOSPADM

## 2024-01-25 RX ORDER — MULTIVIT WITH MINERALS/LUTEIN
1000 TABLET ORAL DAILY
COMMUNITY

## 2024-01-25 RX ORDER — POTASSIUM CHLORIDE 1500 MG/1
20 TABLET, EXTENDED RELEASE ORAL
Status: DISCONTINUED | OUTPATIENT
Start: 2024-01-25 | End: 2024-01-25 | Stop reason: HOSPADM

## 2024-01-25 RX ORDER — LIDOCAINE 40 MG/G
CREAM TOPICAL
Status: DISCONTINUED | OUTPATIENT
Start: 2024-01-25 | End: 2024-01-25 | Stop reason: HOSPADM

## 2024-01-25 RX ORDER — ROSUVASTATIN CALCIUM 20 MG/1
20 TABLET, COATED ORAL DAILY
COMMUNITY

## 2024-01-25 RX ADMIN — METHOHEXITAL SODIUM 60 MG: 500 INJECTION, POWDER, LYOPHILIZED, FOR SOLUTION INTRAMUSCULAR; INTRAVENOUS; RECTAL at 09:44

## 2024-01-25 ASSESSMENT — ACTIVITIES OF DAILY LIVING (ADL)
ADLS_ACUITY_SCORE: 35
ADLS_ACUITY_SCORE: 35

## 2024-01-25 ASSESSMENT — ENCOUNTER SYMPTOMS: DYSRHYTHMIAS: 1

## 2024-01-25 NOTE — PLAN OF CARE
Goal Outcome Evaluation:         Pt admitted for cardioversion due to atrial fibrillation. Pt converted to NSR after 200J shock. Pt is awake and ate.  Discharge instructions given to pt and daughter. Will discharge home with daughter.      Beth oTrres RN

## 2024-01-25 NOTE — ANESTHESIA PREPROCEDURE EVALUATION
Anesthesia Pre-Procedure Evaluation    Patient: Reggie Roper   MRN: [id number] : 1955        Procedure : * No procedures listed *  Cardioversion External       Past Medical History:   Diagnosis Date    Abnormal stress test     Chest pain     Midsternal Pain    Diabetes mellitus (H)     Hyperlipidemia     Hypertension     Left carotid artery stenosis 2022    Shortness of breath on exertion       Past Surgical History:   Procedure Laterality Date    CATARACT EXTRACTION      ENDARTERECTOMY CAROTID Left 10/9/2021    Procedure: LEFT CAROTID ENDARTERECTOMY;  Surgeon: Jaycob Mayo MD;  Location: [facility] OR    LARYNGOSCOPY, FLEXIBLE WITH INJECTION N/A 2021    Procedure: LARYNGOSCOPY, FLEXIBLE WITH INJECTION;  Surgeon: Ophelia Ngo MD;  Location: [facility] OR      Allergies   Allergen Reactions    Amlodipine      Other reaction(s): Edema, leg swelling at high dose  Leg swelling at high doses      Hydrochlorothiazide      Other reaction(s): hypokalemia, Hypokalemia    Lisinopril Cough     Other reaction(s): cough    Losartan      Other reaction(s): face flushing, Flushing      Social History     Tobacco Use    Smoking status: Never    Smokeless tobacco: Never   Substance Use Topics    Alcohol use: Yes     Comment: Alcoholic Drinks/day: occasional      Wt Readings from Last 1 Encounters:   24 102.5 kg (226 lb)        Anesthesia Evaluation            ROS/MED HX  ENT/Pulmonary:     (+) sleep apnea,                                       Neurologic:     (+)          CVA,                      Cardiovascular:     (+)  hypertension- Peripheral Vascular Disease-- Carotid Stenosis.  CAD -  - -                        dysrhythmias, a-fib,        Previous cardiac testing   Echo: Date: 10/2023 Results:  1. Technically difficult study.  2. Normal left ventricular size and systolic performance with a visually  estimated ejection fraction of 50-55%.  3. There is mild aortic valve sclerosis without  "significant stenosis.  4. Probable normal right ventricular size with low normal right ventricular  systolic performance (the right ventricle is poorly visualized on the study).  5. There is mild left atrial enlargement.    Stress Test:  Date: Results:    ECG Reviewed:  Date: Results:    Cath:  Date: Results:      METS/Exercise Tolerance:     Hematologic:  - neg hematologic  ROS     Musculoskeletal:       GI/Hepatic:     (+) GERD,                   Renal/Genitourinary:       Endo:     (+)  type II DM,                    Psychiatric/Substance Use:       Infectious Disease:       Malignancy:       Other:            Physical Exam    Airway  airway exam normal      Mallampati: II   TM distance: > 3 FB   Neck ROM: full   Mouth opening: > 3 cm    Respiratory Devices and Support         Dental  no notable dental history         Cardiovascular   cardiovascular exam normal       Rhythm and rate: regular and normal     Pulmonary   pulmonary exam normal        breath sounds clear to auscultation           OUTSIDE LABS:  CBC:   Lab Results   Component Value Date    WBC 5.9 10/09/2021    WBC 7.8 10/07/2021    HGB 17.0 12/20/2023    HGB 14.8 10/09/2021    HCT 44.2 10/09/2021    HCT 49.3 10/07/2021     (L) 10/09/2021     10/07/2021     BMP:   Lab Results   Component Value Date     12/20/2023     03/30/2023    POTASSIUM 4.2 12/20/2023    POTASSIUM 3.7 03/30/2023    CHLORIDE 101 12/20/2023    CHLORIDE 100 03/30/2023    CO2 28 12/20/2023    CO2 27 03/30/2023    BUN 20.5 12/20/2023    BUN 13.7 03/30/2023    CR 0.89 12/20/2023    CR 0.85 03/30/2023     (H) 12/20/2023     (H) 03/30/2023     COAGS: No results found for: \"PTT\", \"INR\", \"FIBR\"  POC: No results found for: \"BGM\", \"HCG\", \"HCGS\"  HEPATIC:   Lab Results   Component Value Date    ALBUMIN 4.5 03/30/2023    PROTTOTAL 6.9 03/30/2023    ALT 38 03/30/2023    AST 26 03/30/2023    ALKPHOS 104 03/30/2023    BILITOTAL 0.9 03/30/2023     OTHER:   Lab " "Results   Component Value Date    A1C 8.2 (H) 10/07/2021    JESSICA 9.5 12/20/2023    MAG 1.9 10/09/2021    TSH 1.79 10/07/2021       Anesthesia Plan    ASA Status:  3       Anesthesia Type: General.     - Airway: ETT              Consents    Anesthesia Plan(s) and associated risks, benefits, and realistic alternatives discussed. Questions answered and patient/representative(s) expressed understanding.     - Discussed:     - Discussed with:  Patient            Postoperative Care    Pain management: Multi-modal analgesia.   PONV prophylaxis: Ondansetron (or other 5HT-3), Dexamethasone or Solumedrol     Comments:               Alexander Nassar MD    I have reviewed the pertinent notes and labs in the chart from the past 30 days and (re)examined the patient.  Any updates or changes from those notes are reflected in this note.            # Drug Induced Coagulation Defect: home medication list includes an anticoagulant medication   # Obesity: Estimated body mass index is 32.43 kg/m  as calculated from the following:    Height as of this encounter: 1.778 m (5' 10\").    Weight as of this encounter: 102.5 kg (226 lb).      "

## 2024-01-25 NOTE — DISCHARGE INSTRUCTIONS
Sedation: Care Instructions  Overview     Sedation is the use of medicine to help you feel relaxed and comfortable during a procedure. The medicine is usually given in a vein (by I.V.). It may be used with numbing medicines.  There are different levels of sedation. They range from being awake but relaxed to being completely unconscious. Which level you have will depend on the procedure and your needs. You will be watched closely by a doctor or nurse during sedation.  Common side effects from sedation include:  Feeling sleepy or tired. (Your doctors and nurses will make sure you aren't too sleepy to go home.)  Feeling dizzy or unsteady.  Follow-up care is a key part of your treatment and safety. Be sure to make and go to all appointments, and call your doctor if you are having problems. It's also a good idea to know your test results and keep a list of the medicines you take.  How can you care for yourself at home?  Activity    Don't do anything that requires attention to detail until you recover. This includes going to work or school, making important decisions, and signing any legal documents. It takes time for the medicine effects to completely wear off.     For at least 24 hours, do not drive or operate any machinery.     When you get home, make sure to rest until the anesthesia has worn off. Some people will feel drowsy or dizzy for up to a few hours after leaving the hospital.     Take your time and walk slowly. Sudden changes in position may cause nausea.     Rest when you feel tired. Getting enough sleep will help you recover.     If you have sleep apnea and you have a CPAP machine, be sure to use it.   Diet    Don't drink alcohol for 24 hours.     You can eat your normal diet, unless your doctor gives you other instructions. If your stomach is upset, try clear liquids and bland, low-fat foods like plain toast or rice.     Drink plenty of fluids (unless your doctor tells you not to).   When should you call  "for help?   Call 911 anytime you think you may need emergency care. For example, call if:    You have trouble breathing.     You passed out (lost consciousness).   Call your doctor now or seek immediate medical care if:    You have nausea or vomiting that gets worse or won't stop.     You have a fever.     You have a new or worse headache.     The medicine is not wearing off and you can't think clearly.   Watch closely for changes in your health, and be sure to contact your doctor if:    You do not get better as expected.   Where can you learn more?  Go to https://www.TestCred.net/patiented  Enter G817 in the search box to learn more about \"Sedation: Care Instructions.\"  Current as of: June 25, 2023               Content Version: 13.8    9487-1044 [employer].   Care instructions adapted under license by your healthcare professional. If you have questions about a medical condition or this instruction, always ask your healthcare professional. [employer] disclaims any warranty or liability for your use of this information.    Cardioversion  Cardioversion is a procedure to restore your heart's normal rhythm from a fast or irregular rhythm (arrhythmia) in the top or bottom chambers of your heart. You may have the procedure in a hospital or surgery center. It's often done on an outpatient (same day) basis. During the procedure, your doctor will give you medication to keep you free from pain. Then the doctor gives you a brief electric shock. This helps your heartbeat become normal again. In most cases, you can go home within hours of the procedure.    Before Your Procedure  Tell your doctor what over-the-counter and prescription medications, herbs, and supplements you are taking.  Take medication as directed. Your doctor may prescribe anticoagulants (blood thinners), depending on your situation. They help prevent blood clots from forming.  Ask your doctor about the risks and benefits of " cardioversion.  Sign your consent form.  Don t eat or drink anything for 8  hours before your procedure.  Follow any other instructions your doctor gives you.   Arrange for an adult to drive you home after the procedure.     During Your Procedure  Your health care provider will place small pads (electrodes) on your chest to record your heartbeat at all times.  Your health care provider will place an intravenous (IV) line in your arm. This gives you medication (sedation) that keeps you free of pain. You ll feel sleepy.      After Your Procedure  Your health care provider will monitor you until you are fully awake. Then you ll be able to sit up, walk, and eat.  In most cases, you ll be able to go home after the sedation wears off. This usually takes a few hours.  For a few days, the skin on your chest may feel a little sore, like a mild sunburn.  DO NOT  drive or operate heavy machinery for 24 hours after the procedure.  The day after your procedure, try to take it easy. Take medication as directed.  Call your doctor if you notice skipped beats, a rapid heartbeat, or chest tightness. These may be signs that an irregular heartbeat has returned.

## 2024-01-25 NOTE — ANESTHESIA CARE TRANSFER NOTE
Patient: Reggie Roper    Procedure: * No procedures listed *  Cardioversion External    Diagnosis: * No pre-op diagnosis entered *  Diagnosis Additional Information: No value filed.    Anesthesia Type:   General     Note:    Oropharynx: oropharynx clear of all foreign objects and spontaneously breathing  Level of Consciousness: iatrogenic sedation  Oxygen Supplementation: nasal cannula  Level of Supplemental Oxygen (L/min / FiO2): 4  Independent Airway: airway patency satisfactory and stable  Dentition: dentition unchanged  Vital Signs Stable: post-procedure vital signs reviewed and stable  Report to RN Given: handoff report given  Patient transferred to: Cardiac [facility]      Vitals:  Vitals Value Taken Time   /98 01/25/24 0946   Temp     Pulse 64 01/25/24 0946   Resp 25 01/25/24 0946   SpO2 95 % 01/25/24 0946   Vitals shown include unfiled device data.    Electronically Signed By: MARIA GUADALUPE Holland CRNA  January 25, 2024  9:47 AM

## 2024-01-25 NOTE — INTERVAL H&P NOTE
"I have reviewed the surgical (or preoperative) H&P that is linked to this encounter, and examined the patient. There are no significant changes    Clinical Conditions Present on Arrival:  Clinically Significant Risk Factors Present on Admission                # Drug Induced Coagulation Defect: home medication list includes an anticoagulant medication   # Obesity: Estimated body mass index is 32.43 kg/m  as calculated from the following:    Height as of this encounter: 1.778 m (5' 10\").    Weight as of this encounter: 102.5 kg (226 lb).       "

## 2024-01-25 NOTE — PROCEDURES
[facility]    Procedure: Cardioversion    Date/Time: 1/25/2024 10:16 AM    Performed by: Marielle Jacobs APRN CNP  Authorized by: Maddi Shannon APRN CNP      UNIVERSAL PROTOCOL   Site Marked: NA  Prior Images Obtained and Reviewed:  Yes  Required items: Required blood products, implants, devices and special equipment available    Patient identity confirmed:  Verbally with patient, arm band, provided demographic data and hospital-assigned identification number  Patient was reevaluated immediately before administering moderate or deep sedation or anesthesia  Confirmation Checklist:  Patient's identity using two indicators, relevant allergies, procedure was appropriate and matched the consent or emergent situation and correct equipment/implants were available  Time out: Immediately prior to the procedure a time out was called    Universal Protocol: the Joint Commission Universal Protocol was followed       ANESTHESIA    Anesthesia was administered and monitored by anesthesiology.  See anesthesia documentation for details.    SEDATION  Patient Sedated: Yes    Vital signs: Vital signs monitored during sedation      PROCEDURE DETAILS  Cardioversion basis: elective  Pre-procedure rhythm: atrial fibrillation  Patient position: patient was placed in a supine position  Chest area: chest area exposed  Electrodes: pads  Electrodes placed: anterior-posterior  Number of attempts: 1    Details of Attempts:  At 0946 after administered and confirmation of adequate sedation he received a single synchronous shock at 200 J with prompt restoration of sinus rhythm.  Post-procedure rhythm: normal sinus rhythm  Complications: no complications      PROCEDURE  Describe Procedure: History of paroxysmal AF diagnosed 2017, recurrent 10/2023 with mild symptoms of fatigue and shortness of breath with exertion versus asymptomatic.  Successful DCCV today.  -Continue sotalol 80 mg twice a day -  ms, should avoid  further increase   -Discontinue metoprolol  -Continue Xarelto 20 mg daily with dinner for stroke prophylaxis  -Follow-up as scheduled with Maine Jalloh CNP  Patient Tolerance:  Patient tolerated the procedure well with no immediate complications

## 2024-02-20 NOTE — PROGRESS NOTES
Thank you, Maddi Shannon CNP, for asking the [facility] team to see Mr. Reggie Roper to evaluate persistent AF.    Assessment/Recommendations     Assessment/Plan:    Diagnoses and all orders for this visit:  Persistent atrial fibrillation (H)   Paroxysmal atrial fibrillation diagnosed 8/26/2017.  Recurrent AF documented October 2023-asymptomatic.  Sx: Sluggish, lightheaded sensation  Rhythm control: Sotalol 80 mg BID, started 2 days prior to recent DCCV on 1/25/24  DCCV 1/25/24, successful (200J x1)  Patient returns to clinic today and was found to be in atrial flutter with variable AV block, PVCs versus aberrantly conducted complexes were 3 bpm QRS duration 90 ms QT/QTc 362/474 ms  He has no awareness of his arrhythmia, remains asymptomatic today  we discussed the ongoing importance of lifestyle modification (maintaining a healthy weight, sleep apnea diagnosis and management, alcohol avoidance) as part of a long term strategy for atrial fibrillation management  We reviewed the pathophysiology of atrial fibrillation and management considerations including stroke risk and anticoagulation, rate control, cardioversion, antiarrhythmic drug therapy, and catheter ablation.  Antiarrhythmic drug options discussed. We discussed atrial fibrillation ablation procedures, anticipated success rates, the potential need for re-do ablation vs addition of anti-arrhythmic drugs, procedural risks (including groin bleeding, tamponade, phrenic or esophageal injury, stroke, pulmonary vein stenosis) and recovery expectations.          He has a [id number] score of 6 for age 65-74, HTN, CVA, vascular disease, DM2.  Continue Xarelto 20 mg daily for stroke prophylaxis. No bleeding issues reported.  Reports no missed doses of his Xarelto in the last 4 weeks      Benign essential hypertension  -/82, well controlled on current regimen      JAVON on CPAP  -Compliant with use since receiving new  equipment      PLAN:  Increase sotalol to 120 mg twice daily starting today, he will take an extra half tablet of his 80 mg dose when he gets home today and take 120 mg tonight prior to bedtime.  He will return to the clinic this Friday in the afternoon for EKG check to assess QTc interval  If he remains in atrial fibrillation by Friday, schedule repeat cardioversion  Introduced him to catheter ablation procedure today, he will take some time to consider and review the documentation.  Will discuss ablation again at next follow-up appointment  Continue on Xarelto for stroke prevention  Recommend that he purchase smart watch versus [employer] mobile device so he can monitor his rhythm and heart rate since he has no awareness of his arrhythmia based on symptoms     History of Present Illness/Subjective     Reggie Roper is a very pleasant 69 year old male who comes in today for EP follow up persistent AF     Reggie Roper is a 68 year old male who comes in today for EP consultation of atrial fibrillation.  I last saw him in October 2017.  He has a history of atrial fibrillation, nonobstructive coronary artery disease, CVA, carotid artery disease s/p left CEA, hypertension, type 2 diabetes, JAVON on CPAP.     Arrhythmia history  Dx/date: Paroxysmal atrial fibrillation diagnosed 8/26/2017.  Recurrent AF documented October 2023-asymptomatic.  Sx: Sluggish, lightheaded sensation  [id number] score: 6 for age 65-74, HTN, CVA, vascular disease, DM2  Oral anticoagulation: Xarelto 20 mg daily  Antiarrhythmic medications, AV philippe blocking agents: Medication options limited due to tendency for sinus bradycardia  Procedures  DCCV: 1/25/24 (successful, 200 J x1)  Ablation: N/A      Al follows up today post cardioversion, found to be in atrial fibrillation today at 103 bpm.  He has no awareness of his arrhythmia so uncertain as to when he went into atrial fibrillation.  He denies any recent chest pain, palpitations, shortness of breath,  persistent fatigue, dizziness or lightheadedness or presyncope.  He has not noticed any lower extremity swelling.  He remains on sotalol 80 mg twice daily for rhythm control.  He has not missed any doses of his sotalol or his Xarelto, he is tolerating his sotalol without any side effects.  He denies any bleeding issues on his Xarelto and he is taking his Xarelto with full meal at dinnertime.  He was surprised to hear that he was in atrial flutter today since he has been feeling well since his cardioversion.  He does not monitor his rhythm or heart rate by way of smart watch, [employer] mobile device or blood pressure cuff.  Blood pressure well-controlled today, 106/82.  He remains compliant with use of CPAP since receiving equipment.  He was supposed to head down to [place] this weekend but based on this new information today, he is planning to stay in [place] now.       Cardiographics (reviewed):  EKG done 2/21/24 atrial fibrillation with PVCs versus aberrantly conducted complexes were 3 bpm QRS duration 90 ms QT/QTc 362/474 ms  EKG done 1/25/24 NSR with first degree AV block 77 bpm  ms QT/QTC: 436/493 ms  EKG done 10/7/2021 shows sinus bradycardia at 51 bpm, QRS 94 ms, QT/QTc 452/460 ms     Cardiac event monitor worn 10/17/2023 to 10/23/2023 shows persistent atrial fibrillation with rapid ventricular response, average ventricular rate of 122 bpm and a range of 66 to 180 bpm.  No significant bradycardia or pauses.  No significant ventricular ectopy.     ECHO done 10/17/2023:  1. Technically difficult study.  2. Normal left ventricular size and systolic performance with a visually  estimated ejection fraction of 50-55%.  3. There is mild aortic valve sclerosis without significant stenosis.  4. Probable normal right ventricular size with low normal right ventricular  systolic performance (the right ventricle is poorly visualized on the study).  5. There is mild left atrial enlargement.     Coronary angiogram  done 6/23/2014:   Calculated LV ejection fraction is 68 %.      Arterial access obtained via right femoral artery.   Right dominant coronary arterial system.   The LAD coronary artery exhibits mild disease in the proximal   - mid segment.   The LCX coronary artery exhibits mild disease in the proximal   segment.   The right coronary artery exhibits mild disease in the mid   segment.      FFR study was performed on the LAD artery because of a   positive Stress test suggesting this lesion in the LAD was   significant.   Fractional flow reserve was assessed by intravenous infusion   of adenosine.   The coronary flow reserve of .92 as obtained at baseline in   the LAD artery.   The coronary flow reserve of .82 as obtained post-adenosine in   the LAD artery.            Problem List:  Patient Active Problem List   Diagnosis     H/O left MCA distribution infarction     Benign essential hypertension     Esophageal reflux     Coronary atherosclerosis     Heart murmur     Mixed hyperlipidemia     Obesity     Persistent atrial fibrillation (H)     Type 2 diabetes mellitus without complication (H)     S/P carotid endarterectomy     Unilateral partial vocal cord paralysis     Localized edema     JAVON on CPAP     Revi  e  Physical Examination Review of Systems   w [facility]  There were no vitals taken for this visit.  There is no height or weight on file to calculate BMI.  Wt Readings from Last 3 Encounters:   01/25/24 102.5 kg (226 lb)   01/02/24 102.5 kg (226 lb)   10/02/23 103 kg (227 lb)     General Appearance:   Alert, well-appearing and in no acute distress.   HEENT: Atraumatic, normocephalic.  No scleral icterus, normal conjunctivae; mucous membranes pink and moist.     Chest: Chest symmetric, spine straight.   Lungs:   Respirations unlabored: Lungs are clear to auscultation.   Cardiovascular:   Normal first and second heart sounds with no murmurs, rubs, or gallops.  Irregular, RVR.   Normal JVD, no edema.       Extremities:  No cyanosis or clubbing   Musculoskeletal: Moves all extremities   Skin: Warm, dry, intact.    Neurologic: Mood and affect are appropriate, alert and oriented to person, place, time, and situation     ROS: 10 point ROS neg other than the symptoms noted above in the HPI.     Medical History  Surgical History Family History Social History     Past Medical History:   Diagnosis Date     Abnormal stress test      Chest pain     Midsternal Pain     Diabetes mellitus (H)      Hyperlipidemia      Hypertension      Left carotid artery stenosis 1/19/2022     Shortness of breath on exertion     Past Surgical History:   Procedure Laterality Date     CATARACT EXTRACTION       ENDARTERECTOMY CAROTID Left 10/9/2021    Procedure: LEFT CAROTID ENDARTERECTOMY;  Surgeon: Jaycob Mayo MD;  Location: [facility] OR     LARYNGOSCOPY, FLEXIBLE WITH INJECTION N/A 12/9/2021    Procedure: LARYNGOSCOPY, FLEXIBLE WITH INJECTION;  Surgeon: Ophelia Ngo MD;  Location: [facility] OR    Family History   Problem Relation Age of Onset     Heart Disease Father      CABG Father 78.00    History   Smoking Status     Never   Smokeless Tobacco     Never     Social History    Substance and Sexual Activity      Alcohol use: Yes        Comment: Alcoholic Drinks/day: occasional       Medications  Allergies     Current Outpatient Medications   Medication Sig Dispense Refill     amoxicillin (AMOXIL) 500 MG tablet Take 4 tabs (to equal 2,000 mg) 1 hour prior to dental procedure. 4 tablet 5     atorvastatin (LIPITOR) 80 MG tablet 1 tablet (80 mg) by Oral or NG Tube route daily 30 tablet 0     blood glucose (RELION GLUCOSE TEST STRIPS) test strip by In Vitro route daily Use to test blood sugar 1 times daily or as directed.       cholecalciferol (VITAMIN D3) 10 mcg (400 units) TABS tablet Take by mouth daily       dapagliflozin (FARXIGA) 10 MG TABS tablet Take 10 mg by mouth daily       fluorouracil (EFUDEX) 5 % external cream Apply topically daily        Glucose Blood (RELION BLOOD GLUCOSE TEST VI) 1 each by In Vitro route daily       metFORMIN (GLUCOPHAGE) 500 MG tablet Take 1,500 mg by mouth daily       nitroGLYcerin (NITROSTAT) 0.4 MG sublingual tablet Place 0.4 mg under the tongue every 5 minutes as needed for chest pain For chest pain place 1 tablet under the tongue every 5 minutes for 3 doses. If symptoms persist 5 minutes after 1st dose call 911.       omeprazole (PRILOSEC) 20 MG capsule [OMEPRAZOLE (PRILOSEC) 20 MG CAPSULE] Take 20 mg by mouth daily.       rivaroxaban ANTICOAGULANT (XARELTO) 20 MG TABS tablet Take 1 tablet (20 mg) by mouth daily (with dinner) 90 tablet 1     rosuvastatin (CRESTOR) 20 MG tablet Take 20 mg by mouth daily       sitagliptin (JANUVIA) 100 MG tablet Take 100 mg by mouth daily       sotalol (BETAPACE) 80 MG tablet Take 1 tablet (80 mg) by mouth 2 times daily 180 tablet 3     TEKTURNA 150 mg tablet [TEKTURNA 150 MG TABLET] TAKE 1 TABLET BY MOUTH ONCE DAILY FOR BLOOD PRESSURE 90 tablet 3     vitamin C (ASCORBIC ACID) 1000 MG TABS Take 1,000 mg by mouth daily        Allergies   Allergen Reactions     Amlodipine      Other reaction(s): Edema, leg swelling at high dose  Leg swelling at high doses       Hydrochlorothiazide      Other reaction(s): hypokalemia, Hypokalemia     Lisinopril Cough     Other reaction(s): cough     Losartan      Other reaction(s): face flushing, Flushing      Medical, surgical, family, social history, and medications were all reviewed and updated as necessary.   Lab Results    Chemistry/lipid CBC Cardiac Enzymes/BNP/TSH/INR   Recent Labs   Lab Test 12/20/23  1434   CHOL 141   HDL 38*   LDL 86   TRIG 85     Recent Labs   Lab Test 12/20/23  1434 12/21/22  0928 12/30/21  1127   LDL 86 56 59     Recent Labs   Lab Test 12/20/23  1434      POTASSIUM 4.2   CHLORIDE 101   CO2 28   *   BUN 20.5   CR 0.89   GFRESTIMATED >90   JESSICA 9.5     Recent Labs   Lab Test 12/20/23  1434 03/30/23  1007 09/21/22  0810   CR  "0.89 0.85 0.83     Recent Labs   Lab Test 10/07/21  2125   A1C 8.2*          Recent Labs   Lab Test 12/20/23  1434 10/09/21  0621   WBC  --  5.9   HGB 17.0 14.8   HCT  --  44.2   MCV  --  94   PLT  --  141*     Recent Labs   Lab Test 12/20/23  1434 10/09/21  0621 10/07/21  2125   HGB 17.0 14.8 16.2    No results for input(s): \"TROPONINI\" in the last 45966 hours.  No results for input(s): \"BNP\", \"NTBNPI\", \"NTBNP\" in the last 98477 hours.  Recent Labs   Lab Test 10/07/21  2125   TSH 1.79     No results for input(s): \"INR\" in the last 67143 hours.       Total Time- 52 minutes spent on date of encounter doing chart review, history and exam, documentation and further activities as noted above.  This note has been dictated using voice recognition software. Any grammatical, typographical, or context distortions are unintentional and inherent to the software.    Maine Jalloh [facility]  [phone]                       "

## 2024-02-21 ENCOUNTER — OFFICE VISIT (OUTPATIENT)
Dept: CARDIOLOGY | Facility: CLINIC | Age: 69
End: 2024-02-21
Payer: COMMERCIAL

## 2024-02-21 VITALS
OXYGEN SATURATION: 94 % | DIASTOLIC BLOOD PRESSURE: 82 MMHG | BODY MASS INDEX: 32.23 KG/M2 | HEART RATE: 69 BPM | RESPIRATION RATE: 20 BRPM | SYSTOLIC BLOOD PRESSURE: 106 MMHG | WEIGHT: 224.6 LBS

## 2024-02-21 DIAGNOSIS — Z51.81 ENCOUNTER FOR MONITORING SOTALOL THERAPY: ICD-10-CM

## 2024-02-21 DIAGNOSIS — Z79.899 ENCOUNTER FOR MONITORING SOTALOL THERAPY: ICD-10-CM

## 2024-02-21 DIAGNOSIS — I10 BENIGN ESSENTIAL HYPERTENSION: ICD-10-CM

## 2024-02-21 DIAGNOSIS — I48.3 TYPICAL ATRIAL FLUTTER (H): ICD-10-CM

## 2024-02-21 DIAGNOSIS — G47.33 OSA ON CPAP: ICD-10-CM

## 2024-02-21 DIAGNOSIS — I48.19 PERSISTENT ATRIAL FIBRILLATION (H): Primary | ICD-10-CM

## 2024-02-21 PROCEDURE — G2211 COMPLEX E/M VISIT ADD ON: HCPCS | Performed by: NURSE PRACTITIONER

## 2024-02-21 PROCEDURE — 93000 ELECTROCARDIOGRAM COMPLETE: CPT | Performed by: INTERNAL MEDICINE

## 2024-02-21 PROCEDURE — 99215 OFFICE O/P EST HI 40 MIN: CPT | Performed by: NURSE PRACTITIONER

## 2024-02-21 RX ORDER — SOTALOL HYDROCHLORIDE 120 MG/1
120 TABLET ORAL 2 TIMES DAILY
Qty: 180 TABLET | Refills: 0 | Status: SHIPPED | OUTPATIENT
Start: 2024-02-21 | End: 2024-06-17

## 2024-02-21 NOTE — PATIENT INSTRUCTIONS
Reggie Roper,    It was a pleasure to see you today at the [facility].     My recommendations after this visit include:    Increase Sotalol to 120 mg starting today.   Come back to [facility] on Friday afternoon for EKG check to assess your Qtc interval due to dose increase on Sotalol due to new onset atrial flutter  Will assess at that time need for repeat DCCV   Continue on Xarelto for stroke prevention, don't miss any doses for in case of repeat cardioversion (DCCV)    Maine Jalloh [facility], Electrophysiology  [phone]  EP nurses [phone]

## 2024-02-21 NOTE — LETTER
2/21/2024    Simin Sanderson MD  [address] [facility] [address]    RE: Reggie Roper       Dear Colleague,     I had the pleasure of seeing Reggie Roper in the [facility].    Thank you, Maddi Shannon CNP, for asking the [facility] [place] [facility] team to see Mr. Reggie Roper to evaluate persistent AF.    Assessment/Recommendations     Assessment/Plan:    Diagnoses and all orders for this visit:  Persistent atrial fibrillation (H)   Paroxysmal atrial fibrillation diagnosed 8/26/2017.  Recurrent AF documented October 2023-asymptomatic.  Sx: Sluggish, lightheaded sensation  Rhythm control: Sotalol 80 mg BID, started 2 days prior to recent DCCV on 1/25/24  DCCV 1/25/24, successful (200J x1)  Patient returns to clinic today and was found to be in atrial flutter with variable AV block, PVCs versus aberrantly conducted complexes were 3 bpm QRS duration 90 ms QT/QTc 362/474 ms  He has no awareness of his arrhythmia, remains asymptomatic today  we discussed the ongoing importance of lifestyle modification (maintaining a healthy weight, sleep apnea diagnosis and management, alcohol avoidance) as part of a long term strategy for atrial fibrillation management  We reviewed the pathophysiology of atrial fibrillation and management considerations including stroke risk and anticoagulation, rate control, cardioversion, antiarrhythmic drug therapy, and catheter ablation.  Antiarrhythmic drug options discussed. We discussed atrial fibrillation ablation procedures, anticipated success rates, the potential need for re-do ablation vs addition of anti-arrhythmic drugs, procedural risks (including groin bleeding, tamponade, phrenic or esophageal injury, stroke, pulmonary vein stenosis) and recovery expectations.          He has a [id number] score of 6 for age 65-74, HTN, CVA, vascular disease, DM2.  Continue Xarelto 20 mg daily for stroke prophylaxis. No bleeding issues reported.  Reports no missed doses  of his Xarelto in the last 4 weeks      Benign essential hypertension  -/82, well controlled on current regimen      JAVON on CPAP  -Compliant with use since receiving new equipment      PLAN:  Increase sotalol to 120 mg twice daily starting today, he will take an extra half tablet of his 80 mg dose when he gets home today and take 120 mg tonight prior to bedtime.  He will return to the clinic this Friday in the afternoon for EKG check to assess QTc interval  If he remains in atrial flutter by Friday, schedule repeat cardioversion  Introduced him to catheter ablation procedure today, he will take some time to consider and review the documentation.  Will discuss ablation again at next follow-up appointment  Continue on Xarelto for stroke prevention  Recommend that he purchase smart watch versus Vovici mobile device so he can monitor his rhythm and heart rate since he has no awareness of his arrhythmia based on symptoms     History of Present Illness/Subjective     Reggie Roper is a very pleasant 69 year old male who comes in today for EP follow up persistent AF     Reggie Roper is a 68 year old male who comes in today for EP consultation of atrial fibrillation.  I last saw him in October 2017.  He has a history of atrial fibrillation, nonobstructive coronary artery disease, CVA, carotid artery disease s/p left CEA, hypertension, type 2 diabetes, JAVON on CPAP.     Arrhythmia history  Dx/date: Paroxysmal atrial fibrillation diagnosed 8/26/2017.  Recurrent AF documented October 2023-asymptomatic.  Sx: Sluggish, lightheaded sensation  [id number] score: 6 for age 65-74, HTN, CVA, vascular disease, DM2  Oral anticoagulation: Xarelto 20 mg daily  Antiarrhythmic medications, AV philippe blocking agents: Medication options limited due to tendency for sinus bradycardia  Procedures  DCCV: 1/25/24 (successful, 200 J x1)  Ablation: N/A      Al follows up today post cardioversion, found to be in atrial flutter today at 103 bpm.   He has no awareness of his arrhythmia so uncertain as to when he went into atrial flutter.  He denies any recent chest pain, palpitations, shortness of breath, persistent fatigue, dizziness or lightheadedness or presyncope.  He has not noticed any lower extremity swelling.  Atrial flutter is new for him, he was in atrial fibrillation prior to his cardioversion.  He remains on sotalol 80 mg twice daily for rhythm control.  He has not missed any doses of his sotalol or his Xarelto, he is tolerating his sotalol without any side effects.  He denies any bleeding issues on his Xarelto and he is taking his Xarelto with full meal at dinnertime.  He was surprised to hear that he was in atrial flutter today since he has been feeling well since his cardioversion.  He does not monitor his rhythm or heart rate by way of smart watch, TURN8a mobile device or blood pressure cuff.  Blood pressure well-controlled today, 106/82.  He remains compliant with use of CPAP since receiving equipment.  He was supposed to head down to [place] this weekend but based on this new information today, he is planning to stay in [place] now.       Cardiographics (reviewed):  EKG done 2/21/24 atrial flutter with variable AV block, PVCs versus aberrantly conducted complexes were 3 bpm QRS duration 90 ms QT/QTc 362/474 ms  EKG done 1/25/24 NSR with first degree AV block 77 bpm  ms QT/QTC: 436/493 ms  EKG done 10/7/2021 shows sinus bradycardia at 51 bpm, QRS 94 ms, QT/QTc 452/460 ms     Cardiac event monitor worn 10/17/2023 to 10/23/2023 shows persistent atrial fibrillation with rapid ventricular response, average ventricular rate of 122 bpm and a range of 66 to 180 bpm.  No significant bradycardia or pauses.  No significant ventricular ectopy.     ECHO done 10/17/2023:  1. Technically difficult study.  2. Normal left ventricular size and systolic performance with a visually  estimated ejection fraction of 50-55%.  3. There is mild aortic valve  sclerosis without significant stenosis.  4. Probable normal right ventricular size with low normal right ventricular  systolic performance (the right ventricle is poorly visualized on the study).  5. There is mild left atrial enlargement.     Coronary angiogram done 6/23/2014:   Calculated LV ejection fraction is 68 %.      Arterial access obtained via right femoral artery.   Right dominant coronary arterial system.   The LAD coronary artery exhibits mild disease in the proximal   - mid segment.   The LCX coronary artery exhibits mild disease in the proximal   segment.   The right coronary artery exhibits mild disease in the mid   segment.      FFR study was performed on the LAD artery because of a   positive Stress test suggesting this lesion in the LAD was   significant.   Fractional flow reserve was assessed by intravenous infusion   of adenosine.   The coronary flow reserve of .92 as obtained at baseline in   the LAD artery.   The coronary flow reserve of .82 as obtained post-adenosine in   the LAD artery.            Problem List:  Patient Active Problem List   Diagnosis    H/O left MCA distribution infarction    Benign essential hypertension    Esophageal reflux    Coronary atherosclerosis    Heart murmur    Mixed hyperlipidemia    Obesity    Persistent atrial fibrillation (H)    Type 2 diabetes mellitus without complication (H)    S/P carotid endarterectomy    Unilateral partial vocal cord paralysis    Localized edema    JAVON on CPAP     Revi  e  Physical Examination Review of Systems   w [facility]  There were no vitals taken for this visit.  There is no height or weight on file to calculate BMI.  Wt Readings from Last 3 Encounters:   01/25/24 102.5 kg (226 lb)   01/02/24 102.5 kg (226 lb)   10/02/23 103 kg (227 lb)     General Appearance:   Alert, well-appearing and in no acute distress.   HEENT: Atraumatic, normocephalic.  No scleral icterus, normal conjunctivae; mucous membranes pink and moist.     Chest:  Chest symmetric, spine straight.   Lungs:   Respirations unlabored: Lungs are clear to auscultation.   Cardiovascular:   Normal first and second heart sounds with no murmurs, rubs, or gallops.  Irregular, RVR.   Normal JVD, no edema.       Extremities: No cyanosis or clubbing   Musculoskeletal: Moves all extremities   Skin: Warm, dry, intact.    Neurologic: Mood and affect are appropriate, alert and oriented to person, place, time, and situation     ROS: 10 point ROS neg other than the symptoms noted above in the HPI.     Medical History  Surgical History Family History Social History     Past Medical History:   Diagnosis Date    Abnormal stress test     Chest pain     Midsternal Pain    Diabetes mellitus (H)     Hyperlipidemia     Hypertension     Left carotid artery stenosis 1/19/2022    Shortness of breath on exertion     Past Surgical History:   Procedure Laterality Date    CATARACT EXTRACTION      ENDARTERECTOMY CAROTID Left 10/9/2021    Procedure: LEFT CAROTID ENDARTERECTOMY;  Surgeon: Jaycob Mayo MD;  Location: [facility] OR    LARYNGOSCOPY, FLEXIBLE WITH INJECTION N/A 12/9/2021    Procedure: LARYNGOSCOPY, FLEXIBLE WITH INJECTION;  Surgeon: Ophelia Ngo MD;  Location: [facility] OR    Family History   Problem Relation Age of Onset    Heart Disease Father     CABG Father 78.00    History   Smoking Status    Never   Smokeless Tobacco    Never     Social History    Substance and Sexual Activity      Alcohol use: Yes        Comment: Alcoholic Drinks/day: occasional       Medications  Allergies     Current Outpatient Medications   Medication Sig Dispense Refill    amoxicillin (AMOXIL) 500 MG tablet Take 4 tabs (to equal 2,000 mg) 1 hour prior to dental procedure. 4 tablet 5    atorvastatin (LIPITOR) 80 MG tablet 1 tablet (80 mg) by Oral or NG Tube route daily 30 tablet 0    blood glucose (RELION GLUCOSE TEST STRIPS) test strip by In Vitro route daily Use to test blood sugar 1 times daily or as directed.       cholecalciferol (VITAMIN D3) 10 mcg (400 units) TABS tablet Take by mouth daily      dapagliflozin (FARXIGA) 10 MG TABS tablet Take 10 mg by mouth daily      fluorouracil (EFUDEX) 5 % external cream Apply topically daily      Glucose Blood (RELION BLOOD GLUCOSE TEST VI) 1 each by In Vitro route daily      metFORMIN (GLUCOPHAGE) 500 MG tablet Take 1,500 mg by mouth daily      nitroGLYcerin (NITROSTAT) 0.4 MG sublingual tablet Place 0.4 mg under the tongue every 5 minutes as needed for chest pain For chest pain place 1 tablet under the tongue every 5 minutes for 3 doses. If symptoms persist 5 minutes after 1st dose call 911.      omeprazole (PRILOSEC) 20 MG capsule [OMEPRAZOLE (PRILOSEC) 20 MG CAPSULE] Take 20 mg by mouth daily.      rivaroxaban ANTICOAGULANT (XARELTO) 20 MG TABS tablet Take 1 tablet (20 mg) by mouth daily (with dinner) 90 tablet 1    rosuvastatin (CRESTOR) 20 MG tablet Take 20 mg by mouth daily      sitagliptin (JANUVIA) 100 MG tablet Take 100 mg by mouth daily      sotalol (BETAPACE) 80 MG tablet Take 1 tablet (80 mg) by mouth 2 times daily 180 tablet 3    TEKTURNA 150 mg tablet [TEKTURNA 150 MG TABLET] TAKE 1 TABLET BY MOUTH ONCE DAILY FOR BLOOD PRESSURE 90 tablet 3    vitamin C (ASCORBIC ACID) 1000 MG TABS Take 1,000 mg by mouth daily        Allergies   Allergen Reactions    Amlodipine      Other reaction(s): Edema, leg swelling at high dose  Leg swelling at high doses      Hydrochlorothiazide      Other reaction(s): hypokalemia, Hypokalemia    Lisinopril Cough     Other reaction(s): cough    Losartan      Other reaction(s): face flushing, Flushing      Medical, surgical, family, social history, and medications were all reviewed and updated as necessary.   Lab Results    Chemistry/lipid CBC Cardiac Enzymes/BNP/TSH/INR   Recent Labs   Lab Test 12/20/23  1434   CHOL 141   HDL 38*   LDL 86   TRIG 85     Recent Labs   Lab Test 12/20/23  1434 12/21/22  0928 12/30/21  1127   LDL 86 56 59     Recent  "Labs   Lab Test 12/20/23  1434      POTASSIUM 4.2   CHLORIDE 101   CO2 28   *   BUN 20.5   CR 0.89   GFRESTIMATED >90   JESSICA 9.5     Recent Labs   Lab Test 12/20/23  1434 03/30/23  1007 09/21/22  0810   CR 0.89 0.85 0.83     Recent Labs   Lab Test 10/07/21  2125   A1C 8.2*          Recent Labs   Lab Test 12/20/23  1434 10/09/21  0621   WBC  --  5.9   HGB 17.0 14.8   HCT  --  44.2   MCV  --  94   PLT  --  141*     Recent Labs   Lab Test 12/20/23  1434 10/09/21  0621 10/07/21  2125   HGB 17.0 14.8 16.2    No results for input(s): \"TROPONINI\" in the last 24816 hours.  No results for input(s): \"BNP\", \"NTBNPI\", \"NTBNP\" in the last 78612 hours.  Recent Labs   Lab Test 10/07/21  2125   TSH 1.79     No results for input(s): \"INR\" in the last 82146 hours.       Total Time- 52 minutes spent on date of encounter doing chart review, history and exam, documentation and further activities as noted above.  This note has been dictated using voice recognition software. Any grammatical, typographical, or context distortions are unintentional and inherent to the software.    Maine Jalloh CNP  [facility]  [phone]      Thank you for allowing me to participate in the care of your patient.      Sincerely,     Maine Jalloh NP     [facility]  cc:   MARIA GUADALUPE Grier CNP  [address], [address]  [place] [address]      "

## 2024-02-22 LAB
ATRIAL RATE - MUSE: 326 BPM
DIASTOLIC BLOOD PRESSURE - MUSE: NORMAL MMHG
INTERPRETATION ECG - MUSE: NORMAL
P AXIS - MUSE: 114 DEGREES
PR INTERVAL - MUSE: NORMAL MS
QRS DURATION - MUSE: 98 MS
QT - MUSE: 362 MS
QTC - MUSE: 474 MS
R AXIS - MUSE: 51 DEGREES
SYSTOLIC BLOOD PRESSURE - MUSE: NORMAL MMHG
T AXIS - MUSE: 36 DEGREES
VENTRICULAR RATE- MUSE: 103 BPM

## 2024-02-23 ENCOUNTER — ALLIED HEALTH/NURSE VISIT (OUTPATIENT)
Dept: CARDIOLOGY | Facility: CLINIC | Age: 69
End: 2024-02-23
Payer: COMMERCIAL

## 2024-02-23 ENCOUNTER — TELEPHONE (OUTPATIENT)
Dept: CARDIOLOGY | Facility: CLINIC | Age: 69
End: 2024-02-23

## 2024-02-23 VITALS — SYSTOLIC BLOOD PRESSURE: 129 MMHG | OXYGEN SATURATION: 97 % | HEART RATE: 66 BPM | DIASTOLIC BLOOD PRESSURE: 82 MMHG

## 2024-02-23 DIAGNOSIS — I48.3 TYPICAL ATRIAL FLUTTER (H): ICD-10-CM

## 2024-02-23 DIAGNOSIS — Z51.81 ENCOUNTER FOR MONITORING SOTALOL THERAPY: ICD-10-CM

## 2024-02-23 DIAGNOSIS — Z79.899 ENCOUNTER FOR MONITORING SOTALOL THERAPY: ICD-10-CM

## 2024-02-23 LAB
ATRIAL RATE - MUSE: 65 BPM
DIASTOLIC BLOOD PRESSURE - MUSE: NORMAL MMHG
INTERPRETATION ECG - MUSE: NORMAL
P AXIS - MUSE: 69 DEGREES
PR INTERVAL - MUSE: 152 MS
QRS DURATION - MUSE: 114 MS
QT - MUSE: 474 MS
QTC - MUSE: 492 MS
R AXIS - MUSE: 68 DEGREES
SYSTOLIC BLOOD PRESSURE - MUSE: NORMAL MMHG
T AXIS - MUSE: 73 DEGREES
VENTRICULAR RATE- MUSE: 65 BPM

## 2024-02-23 PROCEDURE — 93000 ELECTROCARDIOGRAM COMPLETE: CPT | Performed by: STUDENT IN AN ORGANIZED HEALTH CARE EDUCATION/TRAINING PROGRAM

## 2024-02-23 NOTE — TELEPHONE ENCOUNTER
Maine Jalloh, Brittny Tatum, RN  Caller: Unspecified (Today, 11:45 AM)  Just make sure he picked up his 3 month refill of the Sotalol 120 mg please. IF he goes out of rhythm before he establishes care with a cardiologist down there, he will need to be seen in the ER down there. We won't be able to manage his care down there, we can only give recommendations.    ThanksMaine

## 2024-02-23 NOTE — TELEPHONE ENCOUNTER
Maine Jalloh, Brittny Tatum RN  Caller: Unspecified (Today, 11:45 AM)  Looks great! Continue on current dose of Sotalol 120 mg BID. Have him follow up in clinic again with Maddi in about 5 weeks to ensure that he is maintaining normal rhythm.    ThanksMaine

## 2024-02-23 NOTE — TELEPHONE ENCOUNTER
[place],   Pc to patient, reviewed recommendations.  He has plans to head to [place] and is agreeable to get a cardiologist there to follow up with in 5 weeks.  He wants to make sure you are ok with this.    Thank you!  Mallory

## 2024-02-23 NOTE — TELEPHONE ENCOUNTER
EKG Visit    Pt here for EKG, QTc check after increasing Sotalol to 120mg BID on 2/21    EKG shows SR with HR of 65 bpm, QT of 474 ms, and QTc of 492 ms  QTc within acceptable limits, pts EKG was compared to previous EKG in EMR, EKG is stable, and there has been minimal change in QTc  Vital signes were reviewed and are within normal limits    Pt reports tolerating medication without complaint, reviewed with pt reasoning for above medication, reviewed and confirmed pt understands current dose, administration, and frequency of medication, most common potential side effects from the medication, s/s to call the clinic with, and when to seek emergency medical care    Pt was instructed to continue current medication as prescribed, results would be sent to ordering provider for review, pt will be contacted with further changes if necessary, and pt verbalized understanding     Results sent to   Maine Jalloh  for further review and recommendations if necessary  2/23/2024 11:46 AM  Brittny Ashley RN

## 2024-06-15 DIAGNOSIS — I48.19 PERSISTENT ATRIAL FIBRILLATION (H): ICD-10-CM

## 2024-06-15 DIAGNOSIS — I48.3 TYPICAL ATRIAL FLUTTER (H): ICD-10-CM

## 2024-06-15 DIAGNOSIS — Z51.81 ENCOUNTER FOR MONITORING SOTALOL THERAPY: ICD-10-CM

## 2024-06-15 DIAGNOSIS — Z79.899 ENCOUNTER FOR MONITORING SOTALOL THERAPY: ICD-10-CM

## 2024-06-17 ENCOUNTER — OFFICE VISIT (OUTPATIENT)
Dept: OTHER | Facility: CLINIC | Age: 69
End: 2024-06-17
Attending: SURGERY
Payer: COMMERCIAL

## 2024-06-17 ENCOUNTER — HOSPITAL ENCOUNTER (OUTPATIENT)
Dept: ULTRASOUND IMAGING | Facility: CLINIC | Age: 69
Discharge: HOME | End: 2024-06-17
Attending: SURGERY
Payer: COMMERCIAL

## 2024-06-17 VITALS — HEART RATE: 62 BPM | DIASTOLIC BLOOD PRESSURE: 75 MMHG | SYSTOLIC BLOOD PRESSURE: 117 MMHG

## 2024-06-17 DIAGNOSIS — I65.22 SYMPTOMATIC CAROTID ARTERY STENOSIS, LEFT: Primary | ICD-10-CM

## 2024-06-17 DIAGNOSIS — I65.22 CAROTID ARTERY STENOSIS, SYMPTOMATIC, LEFT: ICD-10-CM

## 2024-06-17 PROCEDURE — 93880 EXTRACRANIAL BILAT STUDY: CPT

## 2024-06-17 PROCEDURE — 93880 EXTRACRANIAL BILAT STUDY: CPT | Mod: 26 | Performed by: SURGERY

## 2024-06-17 PROCEDURE — 99213 OFFICE O/P EST LOW 20 MIN: CPT | Performed by: SURGERY

## 2024-06-17 PROCEDURE — G0463 HOSPITAL OUTPT CLINIC VISIT: HCPCS | Mod: 25 | Performed by: SURGERY

## 2024-06-17 RX ORDER — SOTALOL HYDROCHLORIDE 120 MG/1
TABLET ORAL
Qty: 180 TABLET | Refills: 0 | Status: SHIPPED | OUTPATIENT
Start: 2024-06-17 | End: 2024-08-13

## 2024-06-17 NOTE — PROGRESS NOTES
[facility]        Patient is here for a  follow up.    Pt is currently taking Statin and Xarelto.    /75 (BP Location: Left arm, Patient Position: Chair, Cuff Size: Adult Regular)   Pulse 62     The provider has been notified that the patient has no concerns.     Questions patient would like addressed today are: N/A.    Refills are needed: N/A    Has homecare services and agency name:  Parul Sanabria MA

## 2024-06-17 NOTE — PROGRESS NOTES
Mr. Reggie Roper is a 69-year-old gentleman with a history of severe left carotid stenosis.  He suffered from a left hemispheric cerebrovascular accident in October 2021.  We found he had 90% stenosis of the left cervical internal carotid artery for which she underwent left carotid endarterectomy on 9 October 2021.    He has no complaints.    This is the first year that he has spent the winter in [facility].  He will be in [place] for the summer.    Duplex sonography shows a widely patent left cervical internal carotid artery endarterectomy site.  There is less than 50% stenosis of the right cervical internal carotid artery.    I have reassured him of the same.    We will see him back in 1 year with bilateral carotid duplex sonography for ongoing surveillance.

## 2024-06-18 NOTE — PATIENT INSTRUCTIONS
----- Message from Debbie Villarreal sent at 11/28/2017 10:06 AM CST -----  Contact: [facility] called regarding discussing follow up for the patient and RxDanisha. Please contact [phone]   Thank you for allowing us to provide your medical care.  Please see below for the follow up instructions pertaining to your medical appointment with.    Follow up plan: It is recommended that you complete a repeat US of your carotid arteries & an office visit with Dr. Mayo in one year.    Our office will send you a letter by mail, closer to your follow up time frame, with a reminded to call to schedule appointment(s).  Please call our office with any concerns or questions [phone].

## 2024-07-30 ENCOUNTER — TRANSFERRED RECORDS (OUTPATIENT)
Dept: HEALTH INFORMATION MANAGEMENT | Facility: CLINIC | Age: 69
End: 2024-07-30

## 2024-07-30 ENCOUNTER — LAB REQUISITION (OUTPATIENT)
Dept: LAB | Facility: CLINIC | Age: 69
End: 2024-07-30
Payer: COMMERCIAL

## 2024-07-30 DIAGNOSIS — E11.65 TYPE 2 DIABETES MELLITUS WITH HYPERGLYCEMIA (H): ICD-10-CM

## 2024-07-30 LAB
ALBUMIN SERPL BCG-MCNC: 4.4 G/DL (ref 3.5–5.2)
ALP SERPL-CCNC: 77 U/L (ref 40–150)
ALT SERPL W P-5'-P-CCNC: 43 U/L (ref 0–70)
ANION GAP SERPL CALCULATED.3IONS-SCNC: 11 MMOL/L (ref 7–15)
AST SERPL W P-5'-P-CCNC: 36 U/L (ref 0–45)
BILIRUB SERPL-MCNC: 0.7 MG/DL
BUN SERPL-MCNC: 14 MG/DL (ref 8–23)
CALCIUM SERPL-MCNC: 9.2 MG/DL (ref 8.8–10.4)
CHLORIDE SERPL-SCNC: 103 MMOL/L (ref 98–107)
CREAT SERPL-MCNC: 0.84 MG/DL (ref 0.67–1.17)
CREAT UR-MCNC: 98.5 MG/DL
EGFRCR SERPLBLD CKD-EPI 2021: >90 ML/MIN/1.73M2
GLUCOSE SERPL-MCNC: 138 MG/DL (ref 70–99)
HCO3 SERPL-SCNC: 27 MMOL/L (ref 22–29)
MICROALBUMIN UR-MCNC: <12 MG/L
MICROALBUMIN/CREAT UR: NORMAL MG/G{CREAT}
POTASSIUM SERPL-SCNC: 4.2 MMOL/L (ref 3.4–5.3)
PROT SERPL-MCNC: 7.2 G/DL (ref 6.4–8.3)
SODIUM SERPL-SCNC: 141 MMOL/L (ref 135–145)

## 2024-07-30 PROCEDURE — 82043 UR ALBUMIN QUANTITATIVE: CPT | Mod: ORL | Performed by: FAMILY MEDICINE

## 2024-07-30 PROCEDURE — 80053 COMPREHEN METABOLIC PANEL: CPT | Mod: ORL | Performed by: FAMILY MEDICINE

## 2024-08-06 ENCOUNTER — TRANSFERRED RECORDS (OUTPATIENT)
Dept: HEALTH INFORMATION MANAGEMENT | Facility: CLINIC | Age: 69
End: 2024-08-06

## 2024-08-13 ENCOUNTER — OFFICE VISIT (OUTPATIENT)
Dept: CARDIOLOGY | Facility: CLINIC | Age: 69
End: 2024-08-13
Payer: COMMERCIAL

## 2024-08-13 VITALS
DIASTOLIC BLOOD PRESSURE: 78 MMHG | RESPIRATION RATE: 16 BRPM | WEIGHT: 225 LBS | BODY MASS INDEX: 32.28 KG/M2 | SYSTOLIC BLOOD PRESSURE: 124 MMHG | HEART RATE: 57 BPM

## 2024-08-13 DIAGNOSIS — Z79.899 ENCOUNTER FOR MONITORING SOTALOL THERAPY: ICD-10-CM

## 2024-08-13 DIAGNOSIS — I25.10 ATHEROSCLEROSIS OF NATIVE CORONARY ARTERY OF NATIVE HEART WITHOUT ANGINA PECTORIS: ICD-10-CM

## 2024-08-13 DIAGNOSIS — I48.3 TYPICAL ATRIAL FLUTTER (H): ICD-10-CM

## 2024-08-13 DIAGNOSIS — G47.33 OSA ON CPAP: ICD-10-CM

## 2024-08-13 DIAGNOSIS — Z51.81 ENCOUNTER FOR MONITORING SOTALOL THERAPY: ICD-10-CM

## 2024-08-13 DIAGNOSIS — I10 BENIGN ESSENTIAL HYPERTENSION: ICD-10-CM

## 2024-08-13 DIAGNOSIS — I48.19 PERSISTENT ATRIAL FIBRILLATION (H): Primary | ICD-10-CM

## 2024-08-13 PROCEDURE — 99214 OFFICE O/P EST MOD 30 MIN: CPT | Performed by: NURSE PRACTITIONER

## 2024-08-13 PROCEDURE — G2211 COMPLEX E/M VISIT ADD ON: HCPCS | Performed by: NURSE PRACTITIONER

## 2024-08-13 RX ORDER — SEMAGLUTIDE 0.68 MG/ML
INJECTION, SOLUTION SUBCUTANEOUS
COMMUNITY
Start: 2024-08-06 | End: 2024-09-10

## 2024-08-13 RX ORDER — SOTALOL HYDROCHLORIDE 120 MG/1
120 TABLET ORAL 2 TIMES DAILY
Qty: 180 TABLET | Refills: 3 | Status: SHIPPED | OUTPATIENT
Start: 2024-08-13

## 2024-08-13 NOTE — PATIENT INSTRUCTIONS
Reggie Roper,    It was a pleasure to see you today at the [facility].     My recommendations after this visit include:    Continue Xarelto 20 mg daily with your evening meal to decrease stroke risk with A fib  Continue sotalol 120 mg twice daily to suppress A fib    Consider ablation to treat A fib    Follow up with EP MD (Dr. Juárez, Sandi, or Aakash) in 6 months    Maddi Shannon CNP  [facility], Electrophysiology  [phone]  EP nurses [phone]     Information on Atrial Fibrillation Ablations    What is Catheter Ablation?  A Catheter Ablation is a procedure that treats certain types of abnormal heart rhythms (arrhythmia). There are several components to the procedure, but the final purpose is target and destroy (ablate) small areas of your heart muscle that are causing the arrhythmia.     Why is an Ablations Done?  A catheter ablation is an effective way to treat some types of abnormal heart rhythms. An ablation is a relatively low risk procedure that may permanently cure your abnormal heart rhythm.  The ablation process damages the heart cells which results in scarring of that area. The scar is electrically inactive and can produce a permanent cure for the abnormal rhythm.  Ablation procedures can help avoid the need for rhythm medications and give patients the ability to return to their normal activity and live an active life. In patients that do not have symptoms, ablations are not typically done as there may still be an increased risk of stroke.    Why is Catheter Ablation Done?  Sometimes, the heart s electrical system does not work properly.  This can cause abnormal heart rhythms, called arrhythmias.  During an arrhythmia, the heart may beat too fast, too slowly, or irregularly.  Your doctor has recommended catheter ablation to treat a rapid (fast) heart rhythm, or tachycardia.      How Catheter Ablation Is Done  Catheter ablation uses thin, flexible  wires called electrode catheters to find and destroy (ablate) problem cells.     Here is how the procedure is done:  The pulmonary veins will be treated first. There are currently two tools used to ablate around the pulmonary veins.  Radiofrequency catheter will heat the tissue.  Cryo-balloon catheter will freeze the tissue.   Testing will be done to confirm that effective treatment has been delivered.   Further testing may be performed to see if a fib is still present or if some other rhythm problem such as atrial flutter is present. If an ongoing rhythm problem is discovered then further ablation can be done to isolate and destroy those areas responsible for the arrhythmia.     Your  Experience during Catheter Ablation    In most cases, catheter ablations are done in an electrophysiology (EP) lab.  The procedural area: You will be transferred back to the procedure room once you have been appropriately prepped by the nursing staff and you are ready for your ablation.   Sedation: You to be put completely asleep for your ablation using general anesthesia.  Inserting the catheters: You will have 3-4 catheters inserted into the veins. Catheter locations can include the shoulder, neck, and groins. Catheters are guided to the heart with the help of ultrasound and x-ray monitors.  Finishing up: When the procedure is finished, the catheters are taken out of your body. A special closure device or suture may be used to help seal these puncture sites. You re then taken to your room to rest, and will be cared for by a nurse during your recovery.    Risks and Complications  The risks of catheter ablation are fairly low compared to the benefits you receive. Possible risks and complications include:  Common (up to 10%)  Bleeding or bruising  Shortness of breath  Heartburn  Uncommon (< 1%)  Blood clots  A slow heart rhythm (requiring a permanent pacemaker)  Perforation of the heart muscle, blood vessel, or lung (may require an  emergency procedure)  Stroke  Damage to a heart valve   Heart attack, also known as acute myocardial infarction, or AMI   Death (extremely rare)    Before your Catheter Ablation  Before your catheter ablation, you will meet with the Electrophysiologist (specially trained heart doctor) who will do the procedure. The provider or a registered nurse will provide you with detailed instructions on how to prepare for this procedure, some of these instructions are listed below.  You will likely be told to stop or change your heart rhythm medications for a period of time before your ablation.   You may have testing done several days prior to your ablation or the morning of your ablation, such as an ECG, x-ray, blood tests, or echocardiogram.   You will not be allowed to eat or drink 8 hours before your ablation. You will be given further instructions by your physician or a registered nurse regarding the medication you will take the morning of your procedure.  You will need to arrange to have a  home from the hospital; you will not be permitted to drive after your procedure due to the sedation that you receive.   You are allowed to bring personal items and clothing to the hospital, but please refrain from bringing any valuables as the hospital is not responsible for any lost or stolen items.  You will need to bring a list of the names and dosages of the medication you are taking to the hospital.  It is important to mention to your doctor or registered nurse if you have any allergies, reactions to anesthesia, or have had history of bleeding problems.    Arriving at the Hospital the morning of your Catheter Ablation  Please check in at the time that was given to you by the , who scheduled your procedure. You do not need to arrive any earlier than the time that you were given.    When you arrive, you will be directed to the area where they will be performing your procedure. The doctor or registered nurse will meet  with you prior to your ablation, this is a good time to ask questions and address any concerns you may have. You will then be asked to sign the consent form for your ablation, if this has not already been done.    The nursing staff will begin to prepare you for your procedure:  A nurse will shave and cleanse the area where the ablation catheters will be placed. These areas are most commonly the left and right groin sites (the fold between your thigh and abdomen), and in some cases the chest, arm, and neck. This is done to reduce the risk of infection.    The nursing staff will start an intravenous (IV) line into a vein in your arm, which allows the staff to give you medication and sedatives to help you relax prior and during your ablation.  In some cases, the nursing staff will need to place a catheter that will drain urine from your bladder (Rodríguez Catheter), which is required due to the length and complexity of the ablation you are having.    After Catheter Ablation  Recovery immediately after your ablation in the hospital  After your catheter ablation procedure, you will be taken to a recovery room. After your ablation, you may be required to lay flat or be on bed rest. During this time, a nurse will monitor you, and you will be given medication to make you comfortable.     Going Home  When it is time to go home, your will need to have an adult family member or friend drive you. Most people can walk, climb stairs, and perform light activity soon after catheter ablation. You can most likely return to your full routine within a few days. However, you may be told to avoid running, heavy lifting, and other strenuous activities for a short time. Please make sure to follow any specific activity restrictions provided by the medical staff at the time of your discharge from the hospital.  Doctor's typically advise that you not drive until your post procedure assessment visit.   Avoid heavy physical activity and heavy  lifting for several days after the procedure to allow your body to heal.  Ask your doctor when you can expect to return to work.  Take your temperature and check your incision for signs of infection (redness, swelling, drainage, or warmth) every day for a week. It is normal to have a small bruise or lump where the catheter was inserted.  Take your medications exactly as directed. Do not skip doses or stop medication without consulting your physician prior.  Learn to take your own pulse and keep a record of your results.    Follow-Up  After your ablations you will have a follow up visit to see how you are doing, to assess your rhythm after your ablation, and to address any medication changes if necessary. In many cases, one ablation is enough to treat an arrhythmia. However, sometimes the problem returns or another is found. If this happens, you may need a second catheter ablation. Tell your healthcare provider if you have any new or returning symptoms.    Common Symptoms after Catheter Ablation  In the first few weeks after catheter ablation, you may feel mild chest fullness or aching. You may also feel as if your heart is skipping beats or your heartbeat may feel faster than normal. You may think that your heart rhythm problem is about to return. These sensations are normal and usually go away with time. Talk to your healthcare provider if you are concerned.      When to Call Your Doctor  Increased bleeding, bruising, or pain at the insertion site  Episodes of atrial fibrillation are common post procedure, call the clinic if episodes are lasting longer than 4-6 hours  Difficulty with your speech or walking, or any visual disturbance  Lightheaded, dizziness, or feeling faint  Shortness of breath or chest pain  Coldness, swelling, or numbness of the arm or leg near the insertion site  A bruise or lump at the insertion site that is larger than a walnut  A fever over 100 F

## 2024-08-13 NOTE — LETTER
8/13/2024    Simin Sanderson MD  [address] [facility] [address]    RE: Reggie Roper       Dear Colleague,     I had the pleasure of seeing Reggie Roper in the [facility].    HEART CARE ELECTROPHYSIOLOGY NOTE      [facility]  [phone]      Assessment/Recommendations   Assessment/Plan:  1.  Persistent Atrial Fibrillation: Maintaining sinus rhythm, but without an obvious symptomatic improvement.  Discussed treatment options of rate control versus rhythm control with antiarrhythmic medications or catheter ablation.  We further discussed the ablation procedure including <1-2% risk for stroke, myocardial or esophageal perforation, phrenic nerve injury, or death.  We also discussed the expected recovery and follow-up.  He was given information to review at home.  -- Continue sotalol 120 mg twice daily  -- Consideration for catheter ablation    He was reassured that atrial fibrillation is not life-threatening, but carries an increased risk for stroke.  He has a [id number] score of 6 for age 65-74, HTN, CVA, vascular disease, DM2.    -- Continue Xarelto 20 mg daily with a full meal at the same time each day to ensure proper absorption.    2.  Hypertension: Controlled with sotalol as above.    3.  Coronary artery disease: Nonobstructive per angiography in 2014.  Denies anginal symptoms.  Sinew statin.    4.  Obstructive sleep apnea: Consistent use of CPAP nightly.  Discussed the correlation between untreated sleep apnea and atrial arrhythmias.  Recommended compliance with CPAP therapy.    Follow up with EP MD in 6 months, after he returns from [place]     History of Present Illness/Subjective    HPI: Reggie Roper is a 69 year old male who comes in today for EP follow-up of atrial fibrillation.  He has a history of atrial fibrillation, nonobstructive coronary artery disease, CVA, carotid artery disease s/p left CEA, hypertension, type 2 diabetes, JAVON on  CPAP.    Arrhythmia history  Dx/date: Paroxysmal atrial fibrillation diagnosed 8/26/2017.  Recurrent AF documented October 2023 and 2/21/2024-asymptomatic.  Sx: Sluggish, lightheaded sensation  [id number] score: 6 for age 65-74, HTN, CVA, vascular disease, DM2  Oral anticoagulation: Xarelto 20 mg daily  Antiarrhythmic medications, AV philippe blocking agents: Sotalol (1/23/2024, increased to 120 mg BID 2/21/2024-present)  Procedures  DCCV: 1/25/2024  Ablation: N/A    Al states that he feels well, but no noticeable difference with maintaining sinus rhythm.  He has some mild fatigue and dyspnea on exertion which has developed over the past year, but has been less active.  He denies chest discomfort, palpitations, abdominal fullness/bloating or peripheral edema, shortness of breath at rest, paroxysmal nocturnal dyspnea, orthopnea, lightheadedness, dizziness, pre-syncope, or syncope.      Cardiographics (EKG personally reviewed):  EKG done 2/23/2024 shows sinus rhythm at 65 bpm,  ms, QT/QTc 474/492 ms  EKG done 2/21/2024 shows atrial fibrillation with rapid ventricular response at 103 bpm  EKG done 1/25/2024 shows sinus rhythm at 77 bpm, first-degree AV delay,  ms, QT/QTc 436/493 ms  EKG done 10/7/2021 shows sinus bradycardia at 51 bpm, QRS 94 ms, QT/QTc 452/460 ms    Cardiac event monitor worn 10/17/2023 to 10/23/2023 shows persistent atrial fibrillation with rapid ventricular response, average ventricular rate of 122 bpm and a range of 66 to 180 bpm.  No significant bradycardia or pauses.  No significant ventricular ectopy.    ECHO done 10/17/2023:  1. Technically difficult study.  2. Normal left ventricular size and systolic performance with a visually  estimated ejection fraction of 50-55%.  3. There is mild aortic valve sclerosis without significant stenosis.  4. Probable normal right ventricular size with low normal right ventricular  systolic performance (the right ventricle is poorly visualized on  the study).  5. There is mild left atrial enlargement.    Coronary angiogram done 6/23/2014:   Calculated LV ejection fraction is 68 %.      Arterial access obtained via right femoral artery.   Right dominant coronary arterial system.   The LAD coronary artery exhibits mild disease in the proximal   - mid segment.   The LCX coronary artery exhibits mild disease in the proximal   segment.   The right coronary artery exhibits mild disease in the mid   segment.      FFR study was performed on the LAD artery because of a   positive Stress test suggesting this lesion in the LAD was   significant.   Fractional flow reserve was assessed by intravenous infusion   of adenosine.   The coronary flow reserve of .92 as obtained at baseline in   the LAD artery.   The coronary flow reserve of .82 as obtained post-adenosine in   the LAD artery.    I have reviewed and updated the patient's Past Medical History, Social History, Family History and Medication List.     Physical Examination  Review of Systems   Vitals: /78 (BP Location: Right arm, Patient Position: Sitting, Cuff Size: Adult Large)   Pulse 57   Resp 16   Wt 102.1 kg (225 lb)   BMI 32.28 kg/m    BMI= Body mass index is 32.28 kg/m .  Wt Readings from Last 3 Encounters:   08/13/24 102.1 kg (225 lb)   02/21/24 101.9 kg (224 lb 9.6 oz)   01/25/24 102.5 kg (226 lb)       General Appearance:   Alert, well-appearing and in no acute distress.   HEENT: Atraumatic, normocephalic.  No scleral icterus, normal conjunctivae, EOMs intact, PERRL.  Mucous membranes pink and moist.     Chest/Lungs:   Chest symmetric, spine straight.  Respirations unlabored.  Lungs are clear to auscultation.   Cardiovascular:   Regular rate and rhythm.  Normal first and second heart sounds with no murmurs, rubs, or gallops; radial pulses are intact, no edema.   Abdomen:  Soft, nondistended.   Extremities: No cyanosis or clubbing.   Musculoskeletal: Moves all extremities.     Skin: Warm, dry,  intact.    Neurologic: Mood and affect are appropriate.  Alert and oriented to person, place, time, and situation.     ROS: 10 point ROS neg other than the symptoms noted above in the HPI.        Medical History  Surgical History Family History Social History   Past Medical History:   Diagnosis Date     Abnormal stress test      Chest pain     Midsternal Pain     Coronary atherosclerosis 12/19/2014     Diabetes mellitus (H)      Hyperlipidemia      Hypertension      Left carotid artery stenosis 01/19/2022     Persistent atrial fibrillation (H) 08/30/2017     Shortness of breath on exertion      Past Surgical History:   Procedure Laterality Date     CATARACT EXTRACTION       ENDARTERECTOMY CAROTID Left 10/9/2021    Procedure: LEFT CAROTID ENDARTERECTOMY;  Surgeon: Jaycob Mayo MD;  Location:  OR     LARYNGOSCOPY, FLEXIBLE WITH INJECTION N/A 12/9/2021    Procedure: LARYNGOSCOPY, FLEXIBLE WITH INJECTION;  Surgeon: Ophelia Ngo MD;  Location: [facility] OR     Family History   Problem Relation Age of Onset     Heart Disease Father      CABG Father 78.00        Social History     Socioeconomic History     Marital status: Single     Spouse name: Not on file     Number of children: Not on file     Years of education: Not on file     Highest education level: Not on file   Occupational History     Not on file   Tobacco Use     Smoking status: Never     Smokeless tobacco: Never   Substance and Sexual Activity     Alcohol use: Yes     Comment: Alcoholic Drinks/day: occasional     Drug use: No     Sexual activity: Not on file   Other Topics Concern     Parent/sibling w/ CABG, MI or angioplasty before 65F 55M? Not Asked   Social History Narrative     Not on file     Social Determinants of Health     Financial Resource Strain: Not on file   Food Insecurity: Not on file   Transportation Needs: Not on file   Physical Activity: Not on file   Stress: Not on file   Social Connections: Not on file   Interpersonal  Safety: Not on file   Housing Stability: Not on file           Medications  Allergies   Current Outpatient Medications   Medication Sig Dispense Refill     blood glucose (RELION GLUCOSE TEST STRIPS) test strip by In Vitro route daily Use to test blood sugar 1 times daily or as directed.       cholecalciferol (VITAMIN D3) 10 mcg (400 units) TABS tablet Take 50 mcg by mouth daily       dapagliflozin (FARXIGA) 10 MG TABS tablet Take 10 mg by mouth daily       Glucose Blood (RELION BLOOD GLUCOSE TEST VI) 1 each by In Vitro route daily       metFORMIN (GLUCOPHAGE) 500 MG tablet Take 1,500 mg by mouth daily       omeprazole (PRILOSEC) 20 MG capsule [OMEPRAZOLE (PRILOSEC) 20 MG CAPSULE] Take 20 mg by mouth daily.       rivaroxaban ANTICOAGULANT (XARELTO) 20 MG TABS tablet Take 1 tablet (20 mg) by mouth daily (with dinner) 90 tablet 3     rosuvastatin (CRESTOR) 20 MG tablet Take 20 mg by mouth daily       sitagliptin (JANUVIA) 100 MG tablet Take 100 mg by mouth daily       sotalol (BETAPACE) 120 MG tablet Take 1 tablet (120 mg) by mouth 2 times daily 180 tablet 3     TEKTURNA 150 mg tablet [TEKTURNA 150 MG TABLET] TAKE 1 TABLET BY MOUTH ONCE DAILY FOR BLOOD PRESSURE 90 tablet 3     vitamin C (ASCORBIC ACID) 1000 MG TABS Take 1,000 mg by mouth daily       amoxicillin (AMOXIL) 500 MG tablet Take 4 tabs (to equal 2,000 mg) 1 hour prior to dental procedure. (Patient not taking: Reported on 8/13/2024) 4 tablet 5     fluorouracil (EFUDEX) 5 % external cream Apply topically daily (Patient not taking: Reported on 8/13/2024)       nitroGLYcerin (NITROSTAT) 0.4 MG sublingual tablet Place 0.4 mg under the tongue every 5 minutes as needed for chest pain For chest pain place 1 tablet under the tongue every 5 minutes for 3 doses. If symptoms persist 5 minutes after 1st dose call 911. (Patient not taking: Reported on 8/13/2024)       OZEMPIC, 0.25 OR 0.5 MG/DOSE, 2 MG/3ML pen 0.25mg weekly for one month, then 0.5mg weekly Subcutaneous  (Patient not taking: Reported on 8/13/2024)         Allergies   Allergen Reactions     Amlodipine      Other reaction(s): Edema, leg swelling at high dose  Leg swelling at high doses       Hydrochlorothiazide      Other reaction(s): hypokalemia, Hypokalemia     Lisinopril Cough     Other reaction(s): cough     Losartan      Other reaction(s): face flushing, Flushing          Lab Results    Chemistry/lipid CBC Cardiac Enzymes/BNP/TSH/INR   Recent Labs   Lab Test 12/20/23  1434 12/21/22  0928   CHOL 141 133   HDL 38* 36*   LDL 86 56   TRIG 85 203*     Recent Labs   Lab Test 07/30/24  0909 12/20/23  1434    140   POTASSIUM 4.2 4.2   CHLORIDE 103 101   CO2 27 28   ANIONGAP 11 11   * 120*   BUN 14.0 20.5   CR 0.84 0.89   JESSICA 9.2 9.5    CBC RESULTS:   Recent Labs   Lab Test 12/20/23  1434 10/09/21  0621   WBC  --  5.9   RBC  --  4.69   HGB 17.0 14.8   HCT  --  44.2   MCV  --  94   MCH  --  31.6   MCHC  --  33.5   RDW  --  12.6   PLT  --  141*        TSH   Date Value Ref Range Status   10/07/2021 1.79 0.40 - 4.00 mU/L Final            The longitudinal plan of care for the diagnosis(es)/condition(s) as documented were addressed during this visit. Due to the added complexity in care, I will continue to support Al in the subsequent management and with ongoing continuity of care.                                           Thank you for allowing me to participate in the care of your patient.      Sincerely,     MARIA GUADALUPE [facility]  cc:   Referred Self, MD  No address on file

## 2024-08-13 NOTE — PROGRESS NOTES
HEART CARE ELECTROPHYSIOLOGY NOTE      [facility]  [phone]      Assessment/Recommendations   Assessment/Plan:  1.  Persistent Atrial Fibrillation: Maintaining sinus rhythm, but without an obvious symptomatic improvement.  Discussed treatment options of rate control versus rhythm control with antiarrhythmic medications or catheter ablation.  We further discussed the ablation procedure including <1-2% risk for stroke, myocardial or esophageal perforation, phrenic nerve injury, or death.  We also discussed the expected recovery and follow-up.  He was given information to review at home.  -- Continue sotalol 120 mg twice daily  -- Consideration for catheter ablation    He was reassured that atrial fibrillation is not life-threatening, but carries an increased risk for stroke.  He has a [id number] score of 6 for age 65-74, HTN, CVA, vascular disease, DM2.    -- Continue Xarelto 20 mg daily with a full meal at the same time each day to ensure proper absorption.    2.  Hypertension: Controlled with sotalol as above.    3.  Coronary artery disease: Nonobstructive per angiography in 2014.  Denies anginal symptoms.  Sinew statin.    4.  Obstructive sleep apnea: Consistent use of CPAP nightly.  Discussed the correlation between untreated sleep apnea and atrial arrhythmias.  Recommended compliance with CPAP therapy.    Follow up with EP MD in 6 months, after he returns from [place]     History of Present Illness/Subjective    HPI: Reggie Roper is a 69 year old male who comes in today for EP follow-up of atrial fibrillation.  He has a history of atrial fibrillation, nonobstructive coronary artery disease, CVA, carotid artery disease s/p left CEA, hypertension, type 2 diabetes, JAVON on CPAP.    Arrhythmia history  Dx/date: Paroxysmal atrial fibrillation diagnosed 8/26/2017.  Recurrent AF documented October 2023 and 2/21/2024-asymptomatic.  Sx: Sluggish, lightheaded sensation  [id number] score: 6 for  age 65-74, HTN, CVA, vascular disease, DM2  Oral anticoagulation: Xarelto 20 mg daily  Antiarrhythmic medications, AV philippe blocking agents: Sotalol (1/23/2024, increased to 120 mg BID 2/21/2024-present)  Procedures  DCCV: 1/25/2024  Ablation: N/A    Al states that he feels well, but no noticeable difference with maintaining sinus rhythm.  He has some mild fatigue and dyspnea on exertion which has developed over the past year, but has been less active.  He denies chest discomfort, palpitations, abdominal fullness/bloating or peripheral edema, shortness of breath at rest, paroxysmal nocturnal dyspnea, orthopnea, lightheadedness, dizziness, pre-syncope, or syncope.      Cardiographics (EKG personally reviewed):  EKG done 2/23/2024 shows sinus rhythm at 65 bpm,  ms, QT/QTc 474/492 ms  EKG done 2/21/2024 shows atrial fibrillation with rapid ventricular response at 103 bpm  EKG done 1/25/2024 shows sinus rhythm at 77 bpm, first-degree AV delay,  ms, QT/QTc 436/493 ms  EKG done 10/7/2021 shows sinus bradycardia at 51 bpm, QRS 94 ms, QT/QTc 452/460 ms    Cardiac event monitor worn 10/17/2023 to 10/23/2023 shows persistent atrial fibrillation with rapid ventricular response, average ventricular rate of 122 bpm and a range of 66 to 180 bpm.  No significant bradycardia or pauses.  No significant ventricular ectopy.    ECHO done 10/17/2023:  1. Technically difficult study.  2. Normal left ventricular size and systolic performance with a visually  estimated ejection fraction of 50-55%.  3. There is mild aortic valve sclerosis without significant stenosis.  4. Probable normal right ventricular size with low normal right ventricular  systolic performance (the right ventricle is poorly visualized on the study).  5. There is mild left atrial enlargement.    Coronary angiogram done 6/23/2014:   Calculated LV ejection fraction is 68 %.      Arterial access obtained via right femoral artery.   Right dominant coronary  arterial system.   The LAD coronary artery exhibits mild disease in the proximal   - mid segment.   The LCX coronary artery exhibits mild disease in the proximal   segment.   The right coronary artery exhibits mild disease in the mid   segment.      FFR study was performed on the LAD artery because of a   positive Stress test suggesting this lesion in the LAD was   significant.   Fractional flow reserve was assessed by intravenous infusion   of adenosine.   The coronary flow reserve of .92 as obtained at baseline in   the LAD artery.   The coronary flow reserve of .82 as obtained post-adenosine in   the LAD artery.    I have reviewed and updated the patient's Past Medical History, Social History, Family History and Medication List.     Physical Examination  Review of Systems   Vitals: /78 (BP Location: Right arm, Patient Position: Sitting, Cuff Size: Adult Large)   Pulse 57   Resp 16   Wt 102.1 kg (225 lb)   BMI 32.28 kg/m    BMI= Body mass index is 32.28 kg/m .  Wt Readings from Last 3 Encounters:   08/13/24 102.1 kg (225 lb)   02/21/24 101.9 kg (224 lb 9.6 oz)   01/25/24 102.5 kg (226 lb)       General Appearance:   Alert, well-appearing and in no acute distress.   HEENT: Atraumatic, normocephalic.  No scleral icterus, normal conjunctivae, EOMs intact, PERRL.  Mucous membranes pink and moist.     Chest/Lungs:   Chest symmetric, spine straight.  Respirations unlabored.  Lungs are clear to auscultation.   Cardiovascular:   Regular rate and rhythm.  Normal first and second heart sounds with no murmurs, rubs, or gallops; radial pulses are intact, no edema.   Abdomen:  Soft, nondistended.   Extremities: No cyanosis or clubbing.   Musculoskeletal: Moves all extremities.     Skin: Warm, dry, intact.    Neurologic: Mood and affect are appropriate.  Alert and oriented to person, place, time, and situation.     ROS: 10 point ROS neg other than the symptoms noted above in the HPI.        Medical History  Surgical  History Family History Social History   Past Medical History:   Diagnosis Date     Abnormal stress test      Chest pain     Midsternal Pain     Coronary atherosclerosis 12/19/2014     Diabetes mellitus (H)      Hyperlipidemia      Hypertension      Left carotid artery stenosis 01/19/2022     Persistent atrial fibrillation (H) 08/30/2017     Shortness of breath on exertion      Past Surgical History:   Procedure Laterality Date     CATARACT EXTRACTION       ENDARTERECTOMY CAROTID Left 10/9/2021    Procedure: LEFT CAROTID ENDARTERECTOMY;  Surgeon: Jaycob Mayo MD;  Location:  OR     LARYNGOSCOPY, FLEXIBLE WITH INJECTION N/A 12/9/2021    Procedure: LARYNGOSCOPY, FLEXIBLE WITH INJECTION;  Surgeon: Ophelia Ngo MD;  Location: [facility] [place]     Family History   Problem Relation Age of Onset     Heart Disease Father      CABG Father 78.00        Social History     Socioeconomic History     Marital status: Single     Spouse name: Not on file     Number of children: Not on file     Years of education: Not on file     Highest education level: Not on file   Occupational History     Not on file   Tobacco Use     Smoking status: Never     Smokeless tobacco: Never   Substance and Sexual Activity     Alcohol use: Yes     Comment: Alcoholic Drinks/day: occasional     Drug use: No     Sexual activity: Not on file   Other Topics Concern     Parent/sibling w/ CABG, MI or angioplasty before 65F 55M? Not Asked   Social History Narrative     Not on file     Social Determinants of Health     Financial Resource Strain: Not on file   Food Insecurity: Not on file   Transportation Needs: Not on file   Physical Activity: Not on file   Stress: Not on file   Social Connections: Not on file   Interpersonal Safety: Not on file   Housing Stability: Not on file           Medications  Allergies   Current Outpatient Medications   Medication Sig Dispense Refill     blood glucose (RELION GLUCOSE TEST STRIPS) test strip by In Vitro  route daily Use to test blood sugar 1 times daily or as directed.       cholecalciferol (VITAMIN D3) 10 mcg (400 units) TABS tablet Take 50 mcg by mouth daily       dapagliflozin (FARXIGA) 10 MG TABS tablet Take 10 mg by mouth daily       Glucose Blood (RELION BLOOD GLUCOSE TEST VI) 1 each by In Vitro route daily       metFORMIN (GLUCOPHAGE) 500 MG tablet Take 1,500 mg by mouth daily       omeprazole (PRILOSEC) 20 MG capsule [OMEPRAZOLE (PRILOSEC) 20 MG CAPSULE] Take 20 mg by mouth daily.       rivaroxaban ANTICOAGULANT (XARELTO) 20 MG TABS tablet Take 1 tablet (20 mg) by mouth daily (with dinner) 90 tablet 3     rosuvastatin (CRESTOR) 20 MG tablet Take 20 mg by mouth daily       sitagliptin (JANUVIA) 100 MG tablet Take 100 mg by mouth daily       sotalol (BETAPACE) 120 MG tablet Take 1 tablet (120 mg) by mouth 2 times daily 180 tablet 3     TEKTURNA 150 mg tablet [TEKTURNA 150 MG TABLET] TAKE 1 TABLET BY MOUTH ONCE DAILY FOR BLOOD PRESSURE 90 tablet 3     vitamin C (ASCORBIC ACID) 1000 MG TABS Take 1,000 mg by mouth daily       amoxicillin (AMOXIL) 500 MG tablet Take 4 tabs (to equal 2,000 mg) 1 hour prior to dental procedure. (Patient not taking: Reported on 8/13/2024) 4 tablet 5     fluorouracil (EFUDEX) 5 % external cream Apply topically daily (Patient not taking: Reported on 8/13/2024)       nitroGLYcerin (NITROSTAT) 0.4 MG sublingual tablet Place 0.4 mg under the tongue every 5 minutes as needed for chest pain For chest pain place 1 tablet under the tongue every 5 minutes for 3 doses. If symptoms persist 5 minutes after 1st dose call 911. (Patient not taking: Reported on 8/13/2024)       OZEMPIC, 0.25 OR 0.5 MG/DOSE, 2 MG/3ML pen 0.25mg weekly for one month, then 0.5mg weekly Subcutaneous (Patient not taking: Reported on 8/13/2024)         Allergies   Allergen Reactions     Amlodipine      Other reaction(s): Edema, leg swelling at high dose  Leg swelling at high doses       Hydrochlorothiazide      Other  reaction(s): hypokalemia, Hypokalemia     Lisinopril Cough     Other reaction(s): cough     Losartan      Other reaction(s): face flushing, Flushing          Lab Results    Chemistry/lipid CBC Cardiac Enzymes/BNP/TSH/INR   Recent Labs   Lab Test 12/20/23  1434 12/21/22  0928   CHOL 141 133   HDL 38* 36*   LDL 86 56   TRIG 85 203*     Recent Labs   Lab Test 07/30/24  0909 12/20/23  1434    140   POTASSIUM 4.2 4.2   CHLORIDE 103 101   CO2 27 28   ANIONGAP 11 11   * 120*   BUN 14.0 20.5   CR 0.84 0.89   JESSICA 9.2 9.5    CBC RESULTS:   Recent Labs   Lab Test 12/20/23  1434 10/09/21  0621   WBC  --  5.9   RBC  --  4.69   HGB 17.0 14.8   HCT  --  44.2   MCV  --  94   MCH  --  31.6   MCHC  --  33.5   RDW  --  12.6   PLT  --  141*        TSH   Date Value Ref Range Status   10/07/2021 1.79 0.40 - 4.00 mU/L Final            The longitudinal plan of care for the diagnosis(es)/condition(s) as documented were addressed during this visit. Due to the added complexity in care, I will continue to support Al in the subsequent management and with ongoing continuity of care.

## 2024-09-10 ENCOUNTER — OFFICE VISIT (OUTPATIENT)
Dept: CARDIOLOGY | Facility: CLINIC | Age: 69
End: 2024-09-10
Payer: COMMERCIAL

## 2024-09-10 VITALS
BODY MASS INDEX: 32.28 KG/M2 | DIASTOLIC BLOOD PRESSURE: 88 MMHG | OXYGEN SATURATION: 99 % | RESPIRATION RATE: 16 BRPM | SYSTOLIC BLOOD PRESSURE: 156 MMHG | WEIGHT: 225 LBS | HEART RATE: 49 BPM

## 2024-09-10 DIAGNOSIS — I48.0 PAF (PAROXYSMAL ATRIAL FIBRILLATION) (H): ICD-10-CM

## 2024-09-10 DIAGNOSIS — I10 BENIGN ESSENTIAL HYPERTENSION: ICD-10-CM

## 2024-09-10 DIAGNOSIS — E78.2 MIXED HYPERLIPIDEMIA: ICD-10-CM

## 2024-09-10 DIAGNOSIS — I25.10 ATHEROSCLEROSIS OF NATIVE CORONARY ARTERY OF NATIVE HEART WITHOUT ANGINA PECTORIS: ICD-10-CM

## 2024-09-10 DIAGNOSIS — R60.0 LOCALIZED EDEMA: ICD-10-CM

## 2024-09-10 LAB
CHOLEST SERPL-MCNC: 121 MG/DL
FASTING STATUS PATIENT QL REPORTED: YES
HDLC SERPL-MCNC: 39 MG/DL
LDLC SERPL CALC-MCNC: 61 MG/DL
NONHDLC SERPL-MCNC: 82 MG/DL
TRIGL SERPL-MCNC: 106 MG/DL

## 2024-09-10 PROCEDURE — 36415 COLL VENOUS BLD VENIPUNCTURE: CPT | Performed by: INTERNAL MEDICINE

## 2024-09-10 PROCEDURE — 99214 OFFICE O/P EST MOD 30 MIN: CPT | Performed by: INTERNAL MEDICINE

## 2024-09-10 PROCEDURE — 80061 LIPID PANEL: CPT | Performed by: INTERNAL MEDICINE

## 2024-09-10 NOTE — LETTER
9/10/2024    Simin Sanderson MD  [address] [facility] [address]    RE: Reggie Roper       Dear Colleague,     I had the pleasure of seeing Reggie Roper in the [facility].      [facility]  [phone]          Assessment/Recommendations   Patient with known vascular disease, status post carotid endarterectomy, atrial fibrillation, hypertension who has been feeling well but admits to being fairly sedentary.  I have strongly encouraged him to get back to a walking program with a minimum of 30 minutes and at least 5 times a week.  Recommended that he get a walking stick as he feels he has some mild neuropathy.    Blood pressure is not at goal today but would like to know what is running at home.  Have asked him to send us 10 blood pressure readings over the course of the next couple of weeks so that we can decide if he needs further antihypertensive medications.    LDL cholesterol crept up at the end of 2023.  I would like his LDL cholesterol less than 70.  He is fasting today so we will check a fasting lipid panel.  Recent blood work and entire did not include a fasting lipid panel.    Thank you for allowing us to participate in his care.       History of Present Illness/Subjective    Mr. Reggie Roper is a 69 year old male with known mild coronary artery, carotid artery disease status post left carotid endarterectomy and paroxysmal atrial fibrillation.  He has been feeling well but admits to being sedentary.  Denies chest pain, orthopnea, paroxysmal nocturnal dyspnea and probably some mild peripheral edema.  No syncope or near syncopal episodes.  He does not feel his atrial fibrillation.    Blood pressures at home generally run in the 140s but he does not check them regularly.  He is not exercising regularly.     Physical Examination Review of Systems   BP (!) 156/88 (BP Location: Right arm, Patient Position: Sitting, Cuff Size: Adult Large)   Pulse (!) 49   Resp  16   Wt 102.1 kg (225 lb)   SpO2 99%   BMI 32.28 kg/m    Body mass index is 32.28 kg/m .  Wt Readings from Last 3 Encounters:   09/10/24 102.1 kg (225 lb)   08/13/24 102.1 kg (225 lb)   02/21/24 101.9 kg (224 lb 9.6 oz)     General Appearance:   Alert, cooperative and in no acute distress.   ENT/Mouth: Pink/moist oral mucosa   EYES:  no scleral icterus, normal conjunctivae   Neck: JVP normal. No Hepatojugular reflux. Thyroid not visualized.   Chest/Lungs:   Lungs are clear to auscultation, equal chest wall expansion.   Cardiovascular:   S1, S2 without murmur ,clicks or rubs. Brachial, radial pulses are intact and symetric. No carotid bruits noted   Abdomen:  Nontender. BS+. No bruits.   Extremities: No cyanosis, clubbing and very mild pretibial edema   Skin: no xanthelasma, warm.    Neurologic: normal arm movement bilateral, no tremors     Psychiatric: Appropriate affect.      Enc Vitals  BP: (!) 156/88  Pulse: (!) 49  Resp: 16  SpO2: 99 %  Weight: 102.1 kg (225 lb)                                           Medical History  Surgical History Family History Social History   Past Medical History:   Diagnosis Date     Abnormal stress test      Chest pain     Midsternal Pain     Coronary atherosclerosis 12/19/2014     Diabetes mellitus (H)      Hyperlipidemia      Hypertension      Left carotid artery stenosis 01/19/2022     Persistent atrial fibrillation (H) 08/30/2017     Shortness of breath on exertion     Past Surgical History:   Procedure Laterality Date     CATARACT EXTRACTION       ENDARTERECTOMY CAROTID Left 10/9/2021    Procedure: LEFT CAROTID ENDARTERECTOMY;  Surgeon: Jaycob Mayo MD;  Location:  OR     LARYNGOSCOPY, FLEXIBLE WITH INJECTION N/A 12/9/2021    Procedure: LARYNGOSCOPY, FLEXIBLE WITH INJECTION;  Surgeon: Ophelia Ngo MD;  Location: [facility] [place] OR    Family History   Problem Relation Age of Onset     Heart Disease Father      CABG Father 78.00    Social History     Socioeconomic  History     Marital status: Single     Spouse name: Not on file     Number of children: Not on file     Years of education: Not on file     Highest education level: Not on file   Occupational History     Not on file   Tobacco Use     Smoking status: Never     Smokeless tobacco: Never   Substance and Sexual Activity     Alcohol use: Yes     Comment: Alcoholic Drinks/day: occasional     Drug use: No     Sexual activity: Not on file   Other Topics Concern     Parent/sibling w/ CABG, MI or angioplasty before 65F 55M? Not Asked   Social History Narrative     Not on file     Social Determinants of Health     Financial Resource Strain: Not on file   Food Insecurity: Not on file   Transportation Needs: Not on file   Physical Activity: Not on file   Stress: Not on file   Social Connections: Not on file   Interpersonal Safety: Not on file   Housing Stability: Not on file          Medications  Allergies   Current Outpatient Medications   Medication Sig Dispense Refill     blood glucose (RELION GLUCOSE TEST STRIPS) test strip by In Vitro route daily Use to test blood sugar 1 times daily or as directed.       cholecalciferol (VITAMIN D3) 10 mcg (400 units) TABS tablet Take 50 mcg by mouth daily       dapagliflozin (FARXIGA) 10 MG TABS tablet Take 10 mg by mouth daily       Glucose Blood (RELION BLOOD GLUCOSE TEST VI) 1 each by In Vitro route daily       metFORMIN (GLUCOPHAGE) 500 MG tablet Take 1,500 mg by mouth daily       omeprazole (PRILOSEC) 20 MG capsule [OMEPRAZOLE (PRILOSEC) 20 MG CAPSULE] Take 20 mg by mouth daily.       rivaroxaban ANTICOAGULANT (XARELTO) 20 MG TABS tablet Take 1 tablet (20 mg) by mouth daily (with dinner) 90 tablet 3     rosuvastatin (CRESTOR) 20 MG tablet Take 20 mg by mouth daily       sitagliptin (JANUVIA) 100 MG tablet Take 100 mg by mouth daily       sotalol (BETAPACE) 120 MG tablet Take 1 tablet (120 mg) by mouth 2 times daily 180 tablet 3     TEKTURNA 150 mg tablet [TEKTURNA 150 MG TABLET] TAKE  1 TABLET BY MOUTH ONCE DAILY FOR BLOOD PRESSURE 90 tablet 3     vitamin C (ASCORBIC ACID) 1000 MG TABS Take 1,000 mg by mouth daily      Allergies   Allergen Reactions     Amlodipine      Other reaction(s): Edema, leg swelling at high dose  Leg swelling at high doses       Hydrochlorothiazide      Other reaction(s): hypokalemia, Hypokalemia     Lisinopril Cough     Other reaction(s): cough     Losartan      Other reaction(s): face flushing, Flushing         Lab Results    Chemistry/lipid CBC Cardiac Enzymes/BNP/TSH/INR   Lab Results   Component Value Date    CHOL 141 12/20/2023    HDL 38 (L) 12/20/2023    TRIG 85 12/20/2023    BUN 14.0 07/30/2024     07/30/2024    CO2 27 07/30/2024    Lab Results   Component Value Date    WBC 5.9 10/09/2021    HGB 17.0 12/20/2023    HCT 44.2 10/09/2021    MCV 94 10/09/2021     (L) 10/09/2021    Lab Results   Component Value Date    TSH 1.79 10/07/2021                                                Thank you for allowing me to participate in the care of your patient.      Sincerely,     Nino Salazar MD     [facility]  cc:   Nino Salazar MD  [address]  [place] [phone]

## 2024-09-10 NOTE — PROGRESS NOTES
[facility]  [phone]          Assessment/Recommendations   Patient with known vascular disease, status post carotid endarterectomy, atrial fibrillation, hypertension who has been feeling well but admits to being fairly sedentary.  I have strongly encouraged him to get back to a walking program with a minimum of 30 minutes and at least 5 times a week.  Recommended that he get a walking stick as he feels he has some mild neuropathy.    Blood pressure is not at goal today but would like to know what is running at home.  Have asked him to send us 10 blood pressure readings over the course of the next couple of weeks so that we can decide if he needs further antihypertensive medications.    LDL cholesterol crept up at the end of 2023.  I would like his LDL cholesterol less than 70.  He is fasting today so we will check a fasting lipid panel.  Recent blood work and entire did not include a fasting lipid panel.    Thank you for allowing us to participate in his care.       History of Present Illness/Subjective    Mr. Reggie Roper is a 69 year old male with known mild coronary artery, carotid artery disease status post left carotid endarterectomy and paroxysmal atrial fibrillation.  He has been feeling well but admits to being sedentary.  Denies chest pain, orthopnea, paroxysmal nocturnal dyspnea and probably some mild peripheral edema.  No syncope or near syncopal episodes.  He does not feel his atrial fibrillation.    Blood pressures at home generally run in the 140s but he does not check them regularly.  He is not exercising regularly.     Physical Examination Review of Systems   BP (!) 156/88 (BP Location: Right arm, Patient Position: Sitting, Cuff Size: Adult Large)   Pulse (!) 49   Resp 16   Wt 102.1 kg (225 lb)   SpO2 99%   BMI 32.28 kg/m    Body mass index is 32.28 kg/m .  Wt Readings from Last 3 Encounters:   09/10/24 102.1 kg (225 lb)   08/13/24 102.1 kg (225 lb)   02/21/24 101.9  kg (224 lb 9.6 oz)     General Appearance:   Alert, cooperative and in no acute distress.   ENT/Mouth: Pink/moist oral mucosa   EYES:  no scleral icterus, normal conjunctivae   Neck: JVP normal. No Hepatojugular reflux. Thyroid not visualized.   Chest/Lungs:   Lungs are clear to auscultation, equal chest wall expansion.   Cardiovascular:   S1, S2 without murmur ,clicks or rubs. Brachial, radial pulses are intact and symetric. No carotid bruits noted   Abdomen:  Nontender. BS+. No bruits.   Extremities: No cyanosis, clubbing and very mild pretibial edema   Skin: no xanthelasma, warm.    Neurologic: normal arm movement bilateral, no tremors     Psychiatric: Appropriate affect.      Enc Vitals  BP: (!) 156/88  Pulse: (!) 49  Resp: 16  SpO2: 99 %  Weight: 102.1 kg (225 lb)                                           Medical History  Surgical History Family History Social History   Past Medical History:   Diagnosis Date     Abnormal stress test      Chest pain     Midsternal Pain     Coronary atherosclerosis 12/19/2014     Diabetes mellitus (H)      Hyperlipidemia      Hypertension      Left carotid artery stenosis 01/19/2022     Persistent atrial fibrillation (H) 08/30/2017     Shortness of breath on exertion     Past Surgical History:   Procedure Laterality Date     CATARACT EXTRACTION       ENDARTERECTOMY CAROTID Left 10/9/2021    Procedure: LEFT CAROTID ENDARTERECTOMY;  Surgeon: Jaycob Mayo MD;  Location:  OR     LARYNGOSCOPY, FLEXIBLE WITH INJECTION N/A 12/9/2021    Procedure: LARYNGOSCOPY, FLEXIBLE WITH INJECTION;  Surgeon: Ophelia Ngo MD;  [place] [facility] [place]    Family History   Problem Relation Age of Onset     Heart Disease Father      CABG Father 78.00    Social History     Socioeconomic History     Marital status: Single     Spouse name: Not on file     Number of children: Not on file     Years of education: Not on file     Highest education level: Not on file   Occupational History      Not on file   Tobacco Use     Smoking status: Never     Smokeless tobacco: Never   Substance and Sexual Activity     Alcohol use: Yes     Comment: Alcoholic Drinks/day: occasional     Drug use: No     Sexual activity: Not on file   Other Topics Concern     Parent/sibling w/ CABG, MI or angioplasty before 65F 55M? Not Asked   Social History Narrative     Not on file     Social Determinants of Health     Financial Resource Strain: Not on file   Food Insecurity: Not on file   Transportation Needs: Not on file   Physical Activity: Not on file   Stress: Not on file   Social Connections: Not on file   Interpersonal Safety: Not on file   Housing Stability: Not on file          Medications  Allergies   Current Outpatient Medications   Medication Sig Dispense Refill     blood glucose (RELION GLUCOSE TEST STRIPS) test strip by In Vitro route daily Use to test blood sugar 1 times daily or as directed.       cholecalciferol (VITAMIN D3) 10 mcg (400 units) TABS tablet Take 50 mcg by mouth daily       dapagliflozin (FARXIGA) 10 MG TABS tablet Take 10 mg by mouth daily       Glucose Blood (RELION BLOOD GLUCOSE TEST VI) 1 each by In Vitro route daily       metFORMIN (GLUCOPHAGE) 500 MG tablet Take 1,500 mg by mouth daily       omeprazole (PRILOSEC) 20 MG capsule [OMEPRAZOLE (PRILOSEC) 20 MG CAPSULE] Take 20 mg by mouth daily.       rivaroxaban ANTICOAGULANT (XARELTO) 20 MG TABS tablet Take 1 tablet (20 mg) by mouth daily (with dinner) 90 tablet 3     rosuvastatin (CRESTOR) 20 MG tablet Take 20 mg by mouth daily       sitagliptin (JANUVIA) 100 MG tablet Take 100 mg by mouth daily       sotalol (BETAPACE) 120 MG tablet Take 1 tablet (120 mg) by mouth 2 times daily 180 tablet 3     TEKTURNA 150 mg tablet [TEKTURNA 150 MG TABLET] TAKE 1 TABLET BY MOUTH ONCE DAILY FOR BLOOD PRESSURE 90 tablet 3     vitamin C (ASCORBIC ACID) 1000 MG TABS Take 1,000 mg by mouth daily      Allergies   Allergen Reactions     Amlodipine      Other  reaction(s): Edema, leg swelling at high dose  Leg swelling at high doses       Hydrochlorothiazide      Other reaction(s): hypokalemia, Hypokalemia     Lisinopril Cough     Other reaction(s): cough     Losartan      Other reaction(s): face flushing, Flushing         Lab Results    Chemistry/lipid CBC Cardiac Enzymes/BNP/TSH/INR   Lab Results   Component Value Date    CHOL 141 12/20/2023    HDL 38 (L) 12/20/2023    TRIG 85 12/20/2023    BUN 14.0 07/30/2024     07/30/2024    CO2 27 07/30/2024    Lab Results   Component Value Date    WBC 5.9 10/09/2021    HGB 17.0 12/20/2023    HCT 44.2 10/09/2021    MCV 94 10/09/2021     (L) 10/09/2021    Lab Results   Component Value Date    TSH 1.79 10/07/2021

## 2024-09-10 NOTE — PATIENT INSTRUCTIONS
[facility]  [phone]    Reggie Roper,    It was a pleasure to see you today at the [facility].     My recommendations after this visit include:    1.  Please check your blood pressure once each day for 10 days.  Very the time of day that you check the blood pressure.  Please call the readings to us after you collect 10 blood pressures.  You could also send them to me on [employer].  To call them, call Sagrario Rossi RN at [phone].    2.  Please start a walking program.  Would recommend 30 minutes of walking and at least 5 times a week.    3.  We will recheck your cholesterol today and call you with the results.    4.  We will plan on seeing you back in 1 year, but of course would be happy to see you sooner if questions or problems arise.        iNno Salazar

## 2024-09-10 NOTE — LETTER
September 12, 2024      Momo Roper  [address] [address]        Dear ,    We are writing to inform you of your test results. Lipids are at goal.  Hopefully started exercising/walking.     Resulted Orders   Lipid panel reflex to direct LDL Fasting   Result Value Ref Range    Cholesterol 121 <200 mg/dL    Triglycerides 106 <150 mg/dL    Direct Measure HDL 39 (L) >=40 mg/dL    LDL Cholesterol Calculated 61 <100 mg/dL    Non HDL Cholesterol 82 <130 mg/dL    Patient Fasting > 8hrs? Yes     Narrative    Cholesterol  Desirable: < 200 mg/dL  Borderline High: 200 - 239 mg/dL  High: >= 240 mg/dL    Triglycerides  Normal: < 150 mg/dL  Borderline High: 150 - 199 mg/dL  High: 200-499 mg/dL  Very High: >= 500 mg/dL    Direct Measure HDL  Female: >= 50 mg/dL   Male: >= 40 mg/dL    LDL Cholesterol  Desirable: < 100 mg/dL  Above Desirable: 100 - 129 mg/dL   Borderline High: 130 - 159 mg/dL   High:  160 - 189 mg/dL   Very High: >= 190 mg/dL    Non HDL Cholesterol  Desirable: < 130 mg/dL  Above Desirable: 130 - 159 mg/dL  Borderline High: 160 - 189 mg/dL  High: 190 - 219 mg/dL  Very High: >= 220 mg/dL       If you have any questions or concerns, please call the clinic at the number listed above.       Sincerely,      Nino Salazar MD

## 2024-09-24 ENCOUNTER — TELEPHONE (OUTPATIENT)
Dept: CARDIOLOGY | Facility: CLINIC | Age: 69
End: 2024-09-24
Payer: COMMERCIAL

## 2024-09-24 NOTE — TELEPHONE ENCOUNTER
Lvmm for Pt-kale    From: Nino Salazar MD  Sent: 9/25/2024   7:40 AM CDT  To: Sagrario Zabala,    All blood pressure is at goal except 1.  Continue current medications.  He should monitor his blood pressure a couple times a week.    Claudia,    Nino        Pt called in, provided BP readings. Pt confirmed asymptomatic and ensures adequate hydration. No medication changes. Will update Dr. Salazar and follow up with further recommendations.-kale    Date BP P   09/11 122/66 59   09/14 106/75 62   09/15  09/16 121/79  136/77 101  59     09/18 129/68 56   09/20 126/72 58   09/21 131/71 57   09/22 107/64 58   09/23 131/71 56   09/24 172/84 61

## 2024-09-25 NOTE — TELEPHONE ENCOUNTER
[facility] Call Center    Phone Message    May a detailed message be left on voicemail: yes     Reason for Call: Other: Pt would like Sagrario would like a call back as he missed a call      Action Taken: Other: Cardio    Travel Screening: Not Applicable     Date of Service:

## 2024-10-25 ENCOUNTER — TELEPHONE (OUTPATIENT)
Dept: CARDIOLOGY | Facility: CLINIC | Age: 69
End: 2024-10-25
Payer: COMMERCIAL

## 2024-10-25 NOTE — TELEPHONE ENCOUNTER
Called patient to review recent elevated blood pressure reading.  Per [place] Community Me[place] guidelines, patients blood pressure is out of parameters and recheck blood pressure is recommended.    Last Blood Pressure: 156/88  Last Heart Rate: 49  Date: 9/10/2024  Location: [facility]    Today's Blood Pressure: 131/77  Today's Heart Rate: 45  Location: Home BP    Patient reported blood pressure updated in Epic. Blood pressure falls within [place] Community Me[place] guidelines.  Patient will follow up as previously advised.   
no

## 2025-01-07 ENCOUNTER — LAB REQUISITION (OUTPATIENT)
Dept: LAB | Facility: CLINIC | Age: 70
End: 2025-01-07
Payer: COMMERCIAL

## 2025-01-07 ENCOUNTER — TRANSFERRED RECORDS (OUTPATIENT)
Dept: HEALTH INFORMATION MANAGEMENT | Facility: CLINIC | Age: 70
End: 2025-01-07

## 2025-01-07 DIAGNOSIS — E78.2 MIXED HYPERLIPIDEMIA: ICD-10-CM

## 2025-01-07 LAB
ALBUMIN SERPL BCG-MCNC: 4.3 G/DL (ref 3.5–5.2)
ALP SERPL-CCNC: 75 U/L (ref 40–150)
ALT SERPL W P-5'-P-CCNC: 47 U/L (ref 0–70)
ANION GAP SERPL CALCULATED.3IONS-SCNC: 13 MMOL/L (ref 7–15)
AST SERPL W P-5'-P-CCNC: 30 U/L (ref 0–45)
BILIRUB SERPL-MCNC: 1 MG/DL
BUN SERPL-MCNC: 17.9 MG/DL (ref 8–23)
CALCIUM SERPL-MCNC: 9.9 MG/DL (ref 8.8–10.4)
CHLORIDE SERPL-SCNC: 100 MMOL/L (ref 98–107)
CHOLEST SERPL-MCNC: 101 MG/DL
CREAT SERPL-MCNC: 0.8 MG/DL (ref 0.67–1.17)
EGFRCR SERPLBLD CKD-EPI 2021: >90 ML/MIN/1.73M2
FASTING STATUS PATIENT QL REPORTED: NO
FASTING STATUS PATIENT QL REPORTED: NO
GLUCOSE SERPL-MCNC: 157 MG/DL (ref 70–99)
HCO3 SERPL-SCNC: 28 MMOL/L (ref 22–29)
HDLC SERPL-MCNC: 38 MG/DL
LDLC SERPL CALC-MCNC: 45 MG/DL
NONHDLC SERPL-MCNC: 63 MG/DL
POTASSIUM SERPL-SCNC: 3.6 MMOL/L (ref 3.4–5.3)
PROT SERPL-MCNC: 6.9 G/DL (ref 6.4–8.3)
SODIUM SERPL-SCNC: 141 MMOL/L (ref 135–145)
TRIGL SERPL-MCNC: 90 MG/DL

## 2025-01-07 PROCEDURE — 80061 LIPID PANEL: CPT | Mod: ORL

## 2025-01-07 PROCEDURE — 80053 COMPREHEN METABOLIC PANEL: CPT | Mod: ORL

## 2025-06-30 ENCOUNTER — OFFICE VISIT (OUTPATIENT)
Dept: OTHER | Facility: CLINIC | Age: 70
End: 2025-06-30
Attending: SURGERY
Payer: COMMERCIAL

## 2025-06-30 ENCOUNTER — HOSPITAL ENCOUNTER (OUTPATIENT)
Dept: ULTRASOUND IMAGING | Facility: CLINIC | Age: 70
Discharge: HOME | End: 2025-06-30
Attending: SURGERY
Payer: COMMERCIAL

## 2025-06-30 VITALS — HEART RATE: 67 BPM | DIASTOLIC BLOOD PRESSURE: 85 MMHG | SYSTOLIC BLOOD PRESSURE: 144 MMHG

## 2025-06-30 DIAGNOSIS — I65.22 SYMPTOMATIC CAROTID ARTERY STENOSIS, LEFT: ICD-10-CM

## 2025-06-30 DIAGNOSIS — I65.22 SYMPTOMATIC CAROTID ARTERY STENOSIS, LEFT: Primary | ICD-10-CM

## 2025-06-30 PROBLEM — E11.40 TYPE 2 DIABETES MELLITUS WITH DIABETIC NEUROPATHY, WITHOUT LONG-TERM CURRENT USE OF INSULIN (H): Status: ACTIVE | Noted: 2025-06-30

## 2025-06-30 PROBLEM — Z79.01 CHRONIC ANTICOAGULATION: Status: ACTIVE | Noted: 2025-06-30

## 2025-06-30 PROBLEM — E11.65 HYPERGLYCEMIA DUE TO TYPE 2 DIABETES MELLITUS (H): Status: ACTIVE | Noted: 2025-06-30

## 2025-06-30 PROBLEM — M54.12 CERVICAL RADICULOPATHY: Status: ACTIVE | Noted: 2025-06-30

## 2025-06-30 PROBLEM — D68.59 THROMBOPHILIA: Status: ACTIVE | Noted: 2025-06-30

## 2025-06-30 PROCEDURE — 93880 EXTRACRANIAL BILAT STUDY: CPT

## 2025-06-30 PROCEDURE — 93880 EXTRACRANIAL BILAT STUDY: CPT | Mod: 26 | Performed by: SURGERY

## 2025-06-30 PROCEDURE — G0463 HOSPITAL OUTPT CLINIC VISIT: HCPCS | Performed by: SURGERY

## 2025-06-30 PROCEDURE — 99213 OFFICE O/P EST LOW 20 MIN: CPT | Performed by: SURGERY

## 2025-06-30 PROCEDURE — 3077F SYST BP >= 140 MM HG: CPT | Performed by: SURGERY

## 2025-06-30 PROCEDURE — 3079F DIAST BP 80-89 MM HG: CPT | Performed by: SURGERY

## 2025-06-30 NOTE — PROGRESS NOTES
It was a pleasure to see Mr. Reggie Roper in the vascular surgery clinic today.  He is a very pleasant and active 70-year-old gentleman with history of severe symptomatic left carotid stenosis.  In October 2021 we did a left carotid endarterectomy.    Since that he has done quite well.    He just returned from [place] where he spends his boogie.    He has no complaints.  His medications include rivaroxaban and rosuvastatin.    Sonography shows widely patent left carotid endarterectomy site with less than 50% stenosis on the right.  I reassured him of the finding.  Will see him back in 1 year with bilateral carotid duplex and review for ongoing surveillance.

## 2025-06-30 NOTE — PROGRESS NOTES
[facility]        Patient is here for a  follow up.    Pt is currently taking Statin and Xarelto.    BP (!) 144/85 (BP Location: Left arm, Patient Position: Chair, Cuff Size: Adult Regular)   Pulse 67     The provider has been notified that the patient has no concerns.     Questions patient would like addressed today are: N/A.    Refills are needed: N/A    Has homecare services and agency name:  Parul Sanabria MA

## 2025-07-13 NOTE — PROGRESS NOTES
[facility] [place] [facility]  Cardiac Electrophysiology  [address]  [place] [address]   Office: [phone]  Fax: [phone]     Patient: Reggie Roper   : 1955       CHIEF COMPLAINT/REASON FOR VISIT  Persistent atrial fibrillation      Assessment/Recommendations     Stage 3B/Persistent atrial fibrillation - minimally symptomatic, associated with increased risk of stroke, heart failure and mortality  [id number]  Sotalol (2024-present)  We reviewed atrial fibrillation, managing stroke risk via anticoagulation, managing heart failure risk, rate control, cardioversion, antiarrhythmic drug therapy, and catheter ablation.    Given he has done well on sotalol thus far, he would prefer to continue We will plan for the following:  - continue sotalol 120mg twice daily (QTC 480ms, Cr/GFR 0.8/>90)  - continue rovaroxaban 20mg daily with meals  - consider a device for intermittent heart rhythm eg. Blood pressure monitor, Kardia, Apple Watch  - follow-up 1 year, sooner if needed    Follow up: as above           History of Present Illness   Reggie Roper is a 70 year old male with persistent atrial fibrillation, non-obstructive CAD, CVA, carotid stenosis with prior CEA, HTN, DM, JAVON on CPAP referred by Maddi Shannon CNP for consultation regarding atrial fibrillation.    Mr. Roper's atrial fibrillation history is as summarized below:  Symptoms: mild  Symptom onset date: unknown  Diagnosis date: pAF 2017.  Persistent AF ~10/2023.  Admissions/ER visits: none  Prior medical therapies: sotalol (2024-present)  Prior DCCVs: 2024  Prior ablations: none  Percutaneous left atrial appendage occlusion: none    He notes no known AF recurrences after 2024 DCCV.  He denies chest pain, syncope.  He has lost 33lbs over hte past year on Ozempic.  He boogie in [place] and also has a cardiologist he follows within in [place].       Physical Examination  Review of Systems   VITALS: /64 (BP  Location: Left arm, Patient Position: Sitting, Cuff Size: Adult Regular)   Pulse 72   Resp 14   Wt 88.5 kg (195 lb)   BMI 27.98 kg/m      Wt Readings from Last 3 Encounters:   09/10/24 102.1 kg (225 lb)   08/13/24 102.1 kg (225 lb)   02/21/24 101.9 kg (224 lb 9.6 oz)     CONSTITUTIONAL: well nourished, comfortable, no distress  EYES:  Conjunctivae pink, sclerae clear.    E/N/T:  Oral mucosa pink  RESPIRATORY:  Respiratory effort is normal  CARDIOVASCULAR:  normal S1 and S2  GASTROINTESTINAL:  Abdomen without masses or tenderness  EXTREMITIES:  No clubbing or cyanosis.    MUSCULOSKELETAL:  Overall grossly normal muscle strength  SKIN:  Overall, skin warm and dry, no lesions.  NEURO/PSYCH:  Oriented x 3 with normal affect.   Constitutional:  No weight loss or loss of appetite    Eyes:  No difficulty with vision, no double vision, no dry eyes  ENT:  No sore throat, difficulty swallowing; changes in hearing or tinnitus  Cardiovascular: As detailed above  Respiratory:  No cough  Musculoskeletal  No joint pain, muscle aches  Neurologic:  No syncope, lightheadedness, fainting spells   Hematologic: No easy bruising, excessive bleeding tendency   Gastrointestinal:  No jaundice, abdominal pain or abdominal bloating  Genitourinary: No changes in urinary habits, no trouble urinating    Psychiatric: No anxiety or depression      Medical History  Surgical History   Past Medical History:   Diagnosis Date    Abnormal stress test     Chest pain     Midsternal Pain    Coronary atherosclerosis 12/19/2014    Diabetes mellitus (H)     Hyperlipidemia     Hypertension     Left carotid artery stenosis 01/19/2022    Persistent atrial fibrillation (H) 08/30/2017    Shortness of breath on exertion     Past Surgical History:   Procedure Laterality Date    CATARACT EXTRACTION      ENDARTERECTOMY CAROTID Left 10/9/2021    Procedure: LEFT CAROTID ENDARTERECTOMY;  Surgeon: Jaycob Mayo MD;  Location: [facility] OR    LARYNGOSCOPY, FLEXIBLE  WITH INJECTION N/A 12/9/2021    Procedure: LARYNGOSCOPY, FLEXIBLE WITH INJECTION;  Surgeon: Ophelia Ngo MD;  Location: [facility] [place] [place]         Family History Social History   Family History   Problem Relation Age of Onset    Heart Disease Father     CABG Father 78.00        Social History     Tobacco Use    Smoking status: Never    Smokeless tobacco: Never   Substance Use Topics    Alcohol use: Yes     Comment: Alcoholic Drinks/day: occasional    Drug use: No         Medications  Allergies     Current Outpatient Medications:     blood glucose (RELION GLUCOSE TEST STRIPS) test strip, by In Vitro route daily Use to test blood sugar 1 times daily or as directed., Disp: , Rfl:     cholecalciferol (VITAMIN D3) 10 mcg (400 units) TABS tablet, Take 50 mcg by mouth daily, Disp: , Rfl:     dapagliflozin (FARXIGA) 10 MG TABS tablet, Take 10 mg by mouth daily, Disp: , Rfl:     Glucose Blood (RELION BLOOD GLUCOSE TEST VI), 1 each by In Vitro route daily, Disp: , Rfl:     metFORMIN (GLUCOPHAGE) 500 MG tablet, Take 1,500 mg by mouth daily, Disp: , Rfl:     omeprazole (PRILOSEC) 20 MG capsule, [OMEPRAZOLE (PRILOSEC) 20 MG CAPSULE] Take 20 mg by mouth daily., Disp: , Rfl:     rivaroxaban ANTICOAGULANT (XARELTO) 20 MG TABS tablet, Take 1 tablet (20 mg) by mouth daily (with dinner), Disp: 90 tablet, Rfl: 3    rosuvastatin (CRESTOR) 20 MG tablet, Take 20 mg by mouth daily, Disp: , Rfl:     sitagliptin (JANUVIA) 100 MG tablet, Take 100 mg by mouth daily, Disp: , Rfl:     sotalol (BETAPACE) 120 MG tablet, Take 1 tablet (120 mg) by mouth 2 times daily, Disp: 180 tablet, Rfl: 3    TEKTURNA 150 mg tablet, [TEKTURNA 150 MG TABLET] TAKE 1 TABLET BY MOUTH ONCE DAILY FOR BLOOD PRESSURE, Disp: 90 tablet, Rfl: 3    vitamin C (ASCORBIC ACID) 1000 MG TABS, Take 1,000 mg by mouth daily, Disp: , Rfl:      Allergies   Allergen Reactions    Amlodipine      Other reaction(s): Edema, leg swelling at high dose  Leg swelling at high doses       "Hydrochlorothiazide      Other reaction(s): hypokalemia, Hypokalemia    Lisinopril Cough     Other reaction(s): cough    Losartan      Other reaction(s): face flushing, Flushing          Lab Results    Chemistry CBC Cardiac Enzymes/BNP/TSH/INR   Recent Labs   Lab Test 01/07/25  1204      POTASSIUM 3.6   CHLORIDE 100   CO2 28   *   BUN 17.9   CR 0.80   GFRESTIMATED >90   JESSICA 9.9     Recent Labs   Lab Test 01/07/25  1204 07/30/24  0909 12/20/23  1434   CR 0.80 0.84 0.89          Recent Labs   Lab Test 12/20/23  1434 10/09/21  0621   WBC  --  5.9   HGB 17.0 14.8   HCT  --  44.2   MCV  --  94   PLT  --  141*     Recent Labs   Lab Test 12/20/23  1434 10/09/21  0621 10/07/21  2125   HGB 17.0 14.8 16.2    No results for input(s): \"TROPONINI\" in the last 46421 hours.  No results for input(s): \"BNP\", \"NTBNPI\", \"NTBNP\" in the last 02541 hours.  Recent Labs   Lab Test 10/07/21  2125   TSH 1.79     No results for input(s): \"INR\" in the last 92938 hours.      Data Review    ECGs (tracings independently reviewed)  7/16/2025 - SR 73bpm, QTC 480ms  2/23/2024 - SR 65bpm, QTC 492ms    Cardiac event monitoring from 10/17/2023 to 10/23/2023 (monitored duration only 2d 10h 34m) (independently reviewed)  Baseline rhythm was atrial fibrillation, ventricular rate 126bpm.    Reported heart rate range 66 to 180bpm, average 122bpm.  1 symptom triggers (other) correlated to atrial fibrillation, ventricular rate 100bpm.   Automated recordings included atrial fibrillation with and without isolated PVCs.  There were no pauses noted.  Ventricular ectopic beat frequency are not reported on this monitoring modality.      10/17/2023 TTE  1. Technically difficult study.  2. Normal left ventricular size and systolic performance with a visually  estimated ejection fraction of 50-55%.  3. There is mild aortic valve sclerosis without significant stenosis.  4. Probable normal right ventricular size with low normal right ventricular  systolic " performance (the right ventricle is poorly visualized on the study).  5. There is mild left atrial enlargement.         Cc: Maddi Shannon CNP, Nino Salazar MD, Simin Sanderson MD Amila Dilusha William, MD  7/15/2025  1:00 PM

## 2025-07-15 ENCOUNTER — OFFICE VISIT (OUTPATIENT)
Dept: CARDIOLOGY | Facility: CLINIC | Age: 70
End: 2025-07-15
Payer: COMMERCIAL

## 2025-07-15 VITALS
SYSTOLIC BLOOD PRESSURE: 100 MMHG | BODY MASS INDEX: 27.98 KG/M2 | HEART RATE: 72 BPM | WEIGHT: 195 LBS | RESPIRATION RATE: 14 BRPM | DIASTOLIC BLOOD PRESSURE: 64 MMHG

## 2025-07-15 DIAGNOSIS — I48.19 PERSISTENT ATRIAL FIBRILLATION (H): Primary | ICD-10-CM

## 2025-07-15 PROCEDURE — 3078F DIAST BP <80 MM HG: CPT | Performed by: INTERNAL MEDICINE

## 2025-07-15 PROCEDURE — 3074F SYST BP LT 130 MM HG: CPT | Performed by: INTERNAL MEDICINE

## 2025-07-15 PROCEDURE — 93000 ELECTROCARDIOGRAM COMPLETE: CPT | Performed by: INTERNAL MEDICINE

## 2025-07-15 PROCEDURE — 99204 OFFICE O/P NEW MOD 45 MIN: CPT | Performed by: INTERNAL MEDICINE

## 2025-07-15 PROCEDURE — G2211 COMPLEX E/M VISIT ADD ON: HCPCS | Performed by: INTERNAL MEDICINE

## 2025-07-15 RX ORDER — METFORMIN HYDROCHLORIDE 500 MG/1
1000 TABLET, EXTENDED RELEASE ORAL 2 TIMES DAILY WITH MEALS
COMMUNITY
Start: 2025-07-14

## 2025-07-15 RX ORDER — SEMAGLUTIDE 0.68 MG/ML
0.5 INJECTION, SOLUTION SUBCUTANEOUS
COMMUNITY

## 2025-07-15 RX ORDER — CHOLECALCIFEROL (VITAMIN D3) 50 MCG
1 TABLET ORAL DAILY
COMMUNITY

## 2025-07-15 NOTE — LETTER
7/15/2025    Simin Sanderson MD  [address] [place] [address]    RE: Reggie Roper       Dear Colleague,     I had the pleasure of seeing Reggie Roper in the [facility].     [facility]  Cardiac Electrophysiology  [address]  [place] [address]   Office: [phone]  Fax: [phone]     Patient: Reggie Roper   : 1955       CHIEF COMPLAINT/REASON FOR VISIT  Persistent atrial fibrillation      Assessment/Recommendations     Stage 3B/Persistent atrial fibrillation - minimally symptomatic, associated with increased risk of stroke, heart failure and mortality  [id number]  Sotalol (2024-present)  We reviewed atrial fibrillation, managing stroke risk via anticoagulation, managing heart failure risk, rate control, cardioversion, antiarrhythmic drug therapy, and catheter ablation.    Given he has done well on sotalol thus far, he would prefer to continue We will plan for the following:  - continue sotalol 120mg twice daily (QTC 480ms, Cr/GFR 0.8/>90)  - continue rovaroxaban 20mg daily with meals  - consider a device for intermittent heart rhythm eg. Blood pressure monitor, [employer], Apple Watch  - follow-up 1 year, sooner if needed    Follow up: as above           History of Present Illness   Reggie Roper is a 70 year old male with persistent atrial fibrillation, non-obstructive CAD, CVA, carotid stenosis with prior CEA, HTN, DM, JAVON on CPAP referred by Maddi Shannon CNP for consultation regarding atrial fibrillation.    Mr. Roper's atrial fibrillation history is as summarized below:  Symptoms: mild  Symptom onset date: unknown  Diagnosis date: pAF 2017.  Persistent AF ~10/2023.  Admissions/ER visits: none  Prior medical therapies: sotalol (2024-present)  Prior DCCVs: 2024  Prior ablations: none  Percutaneous left atrial appendage occlusion: none    He notes no known AF recurrences after 2024 DCCV.  He denies chest pain, syncope.  He  has lost 33lbs over hte past year on Ozempic.  He boogie in [place] and also has a cardiologist he follows within in [place].       Physical Examination  Review of Systems   VITALS: /64 (BP Location: Left arm, Patient Position: Sitting, Cuff Size: Adult Regular)   Pulse 72   Resp 14   Wt 88.5 kg (195 lb)   BMI 27.98 kg/m      Wt Readings from Last 3 Encounters:   09/10/24 102.1 kg (225 lb)   08/13/24 102.1 kg (225 lb)   02/21/24 101.9 kg (224 lb 9.6 oz)     CONSTITUTIONAL: well nourished, comfortable, no distress  EYES:  Conjunctivae pink, sclerae clear.    E/N/T:  Oral mucosa pink  RESPIRATORY:  Respiratory effort is normal  CARDIOVASCULAR:  normal S1 and S2  GASTROINTESTINAL:  Abdomen without masses or tenderness  EXTREMITIES:  No clubbing or cyanosis.    MUSCULOSKELETAL:  Overall grossly normal muscle strength  SKIN:  Overall, skin warm and dry, no lesions.  NEURO/PSYCH:  Oriented x 3 with normal affect.   Constitutional:  No weight loss or loss of appetite    Eyes:  No difficulty with vision, no double vision, no dry eyes  ENT:  No sore throat, difficulty swallowing; changes in hearing or tinnitus  Cardiovascular: As detailed above  Respiratory:  No cough  Musculoskeletal  No joint pain, muscle aches  Neurologic:  No syncope, lightheadedness, fainting spells   Hematologic: No easy bruising, excessive bleeding tendency   Gastrointestinal:  No jaundice, abdominal pain or abdominal bloating  Genitourinary: No changes in urinary habits, no trouble urinating    Psychiatric: No anxiety or depression      Medical History  Surgical History   Past Medical History:   Diagnosis Date     Abnormal stress test      Chest pain     Midsternal Pain     Coronary atherosclerosis 12/19/2014     Diabetes mellitus (H)      Hyperlipidemia      Hypertension      Left carotid artery stenosis 01/19/2022     Persistent atrial fibrillation (H) 08/30/2017     Shortness of breath on exertion     Past Surgical History:   Procedure Laterality  Date     CATARACT EXTRACTION       ENDARTERECTOMY CAROTID Left 10/9/2021    Procedure: LEFT CAROTID ENDARTERECTOMY;  Surgeon: Jaycob Mayo MD;  Location:  OR     LARYNGOSCOPY, FLEXIBLE WITH INJECTION N/A 12/9/2021    Procedure: LARYNGOSCOPY, FLEXIBLE WITH INJECTION;  Surgeon: Ophelia Ngo MD;  Location: [facility] OR         Family History Social History   Family History   Problem Relation Age of Onset     Heart Disease Father      CABG Father 78.00        Social History     Tobacco Use     Smoking status: Never     Smokeless tobacco: Never   Substance Use Topics     Alcohol use: Yes     Comment: Alcoholic Drinks/day: occasional     Drug use: No         Medications  Allergies     Current Outpatient Medications:      blood glucose (RELION GLUCOSE TEST STRIPS) test strip, by In Vitro route daily Use to test blood sugar 1 times daily or as directed., Disp: , Rfl:      cholecalciferol (VITAMIN D3) 10 mcg (400 units) TABS tablet, Take 50 mcg by mouth daily, Disp: , Rfl:      dapagliflozin (FARXIGA) 10 MG TABS tablet, Take 10 mg by mouth daily, Disp: , Rfl:      Glucose Blood (RELION BLOOD GLUCOSE TEST VI), 1 each by In Vitro route daily, Disp: , Rfl:      metFORMIN (GLUCOPHAGE) 500 MG tablet, Take 1,500 mg by mouth daily, Disp: , Rfl:      omeprazole (PRILOSEC) 20 MG capsule, [OMEPRAZOLE (PRILOSEC) 20 MG CAPSULE] Take 20 mg by mouth daily., Disp: , Rfl:      rivaroxaban ANTICOAGULANT (XARELTO) 20 MG TABS tablet, Take 1 tablet (20 mg) by mouth daily (with dinner), Disp: 90 tablet, Rfl: 3     rosuvastatin (CRESTOR) 20 MG tablet, Take 20 mg by mouth daily, Disp: , Rfl:      sitagliptin (JANUVIA) 100 MG tablet, Take 100 mg by mouth daily, Disp: , Rfl:      sotalol (BETAPACE) 120 MG tablet, Take 1 tablet (120 mg) by mouth 2 times daily, Disp: 180 tablet, Rfl: 3     TEKTURNA 150 mg tablet, [TEKTURNA 150 MG TABLET] TAKE 1 TABLET BY MOUTH ONCE DAILY FOR BLOOD PRESSURE, Disp: 90 tablet, Rfl: 3     vitamin C  "(ASCORBIC ACID) 1000 MG TABS, Take 1,000 mg by mouth daily, Disp: , Rfl:      Allergies   Allergen Reactions     Amlodipine      Other reaction(s): Edema, leg swelling at high dose  Leg swelling at high doses       Hydrochlorothiazide      Other reaction(s): hypokalemia, Hypokalemia     Lisinopril Cough     Other reaction(s): cough     Losartan      Other reaction(s): face flushing, Flushing          Lab Results    Chemistry CBC Cardiac Enzymes/BNP/TSH/INR   Recent Labs   Lab Test 01/07/25  1204      POTASSIUM 3.6   CHLORIDE 100   CO2 28   *   BUN 17.9   CR 0.80   GFRESTIMATED >90   JESSICA 9.9     Recent Labs   Lab Test 01/07/25  1204 07/30/24  0909 12/20/23  1434   CR 0.80 0.84 0.89          Recent Labs   Lab Test 12/20/23  1434 10/09/21  0621   WBC  --  5.9   HGB 17.0 14.8   HCT  --  44.2   MCV  --  94   PLT  --  141*     Recent Labs   Lab Test 12/20/23  1434 10/09/21  0621 10/07/21  2125   HGB 17.0 14.8 16.2    No results for input(s): \"TROPONINI\" in the last 05419 hours.  No results for input(s): \"BNP\", \"NTBNPI\", \"NTBNP\" in the last 02639 hours.  Recent Labs   Lab Test 10/07/21  2125   TSH 1.79     No results for input(s): \"INR\" in the last 84717 hours.      Data Review    ECGs (tracings independently reviewed)  7/16/2025 - SR 73bpm, QTC 480ms  2/23/2024 - SR 65bpm, QTC 492ms    Cardiac event monitoring from 10/17/2023 to 10/23/2023 (monitored duration only 2d 10h 34m) (independently reviewed)  Baseline rhythm was atrial fibrillation, ventricular rate 126bpm.    Reported heart rate range 66 to 180bpm, average 122bpm.  1 symptom triggers (other) correlated to atrial fibrillation, ventricular rate 100bpm.   Automated recordings included atrial fibrillation with and without isolated PVCs.  There were no pauses noted.  Ventricular ectopic beat frequency are not reported on this monitoring modality.      10/17/2023 TTE  1. Technically difficult study.  2. Normal left ventricular size and systolic " performance with a visually  estimated ejection fraction of 50-55%.  3. There is mild aortic valve sclerosis without significant stenosis.  4. Probable normal right ventricular size with low normal right ventricular  systolic performance (the right ventricle is poorly visualized on the study).  5. There is mild left atrial enlargement.         Cc: Maddi Shannon CNP, Nino Salazar MD, Simin Sanderson MD Amila Dilusha William, MD  7/15/2025  1:00 PM        Thank you for allowing me to participate in the care of your patient.      Sincerely,     Lukas Finn MD     [facility]  cc:   Lukas Finn MD  [address]  [place] [address]

## 2025-07-15 NOTE — PATIENT INSTRUCTIONS
[facility]  Cardiac Electrophysiology  [address]  [place] [address]   Office: [phone]  Fax: [phone]     Thank you for seeing us in clinic today - it is a pleasure to be a part of your care team.  Below is a summary of our plan from today's visit.       You have atrial fibrillation.  We reviewed atrial fibrillation, treatment options (ablation vs antiarrhythmic drug therapy), and long term stroke risk prevention (blood thinner).    We will plan for the following:  - consider a device for intermittent heart rhythm eg. Blood pressure monitor, [employer], Apple Watch  - continue sotalol 120mg twice daily  - continue rovaroxaban 20mg daily with meals  - follow-up in 1 year, sooner if needed     Please do not hesitate to be in touch with our office at [phone] with any questions that may arise.       Thank you for trusting us with your care,    Lukas Finn MD  Clinical Cardiac Electrophysiology  [facility]  [address]  [place] [address]   Office: [phone]  Fax: [phone]        ATRIAL FIBRILLATION: Patient Information    What is atrial fibrillation?  Atrial fibrillation (AF, A-fib) is a common heart rhythm problem (arrhythmia) occurring within the upper chambers of the heart (the atria).  In normal rhythm, the upper and lower chambers of the heart are electrically driven to contract in a coordinated sequence.  In atrial fibrillation, the atria lose their ability to contract because of rapid and chaotic electrical activity.  The lower chambers of the heart (the ventricles) continue to pump blood throughout the body, though with irregular and often faster rate due to the chaotic activity within the atria.        How do I know if I have atrial fibrillation?   Some people may feel their heart beating faster, harder, or irregularly while in atrial fibrillation.  Others may be lightheaded, fatigued, feel weak or  tired or become more short of breath especially with activities.  Some patients have no symptoms at all.  Atrial fibrillation may be found due to an irregular pulse or on an electrocardiogram (ECG). Atrial fibrillation can start and stop on its own, and episodes can last from seconds to several months.      How common is atrial fibrillation?   An estimated 3-6 million people in the [place] have atrial fibrillation.  Atrial fibrillation is a common heart rhythm problem for older persons, affecting as estimated 12-15% of people over the age of 65 years of age.    What causes atrial fibrillation?   Age is the most important risk factor for atrial fibrillation.  Atrial fibrillation is more common in people with other heart disease, high blood pressure, diabetes, obesity, sleep apnea and in people who regularly consume alcohol.  Surgery, lung disease, or thyroid problems can lead to atrial fibrillation.  Atrial fibrillation has multiple possible causes, and in most cases a single cause cannot be found.  Atrial fibrillation is a progressive condition, usually starting with at an early stage with short and infrequent episodes.  In later stages of disease, more frequent and longer lasting episodes of atrial fibrillation occur, ultimately culminating in episodes which do not spontaneously terminate.  Generally, more enlargement and scarring within the upper chambers of the heart is observed as atrial fibrillation progresses from early to late-stage disease.    How is atrial fibrillation diagnosed and evaluated?    Because of its start-stop nature, atrial fibrillation can be challenging to diagnose.  Atrial fibrillation is most commonly diagnosed via cardiac rhythm recordings - either an ECG or wearable cardiac rhythm monitor.  For patients with pacemakers, defibrillators or implantable loop recorders, atrial fibrillation may be recorded via these devices.  Recently, commercially available devices (eg. Apple Watch,  Kardia device, certain FitBit devices, others) can allow patients to take 30 second cardiac rhythm recordings which may document atrial fibrillation.  Once atrial fibrillation is diagnosed, additional tests include blood tests and an echocardiogram.  The echocardiogram uses ultrasound to look at your heart to assess your cardiac function and evaluate for other heart disease.  Additional evaluation may include CT or MRI studies.    Is atrial fibrillation dangerous?   Atrial fibrillation is not usually a life-threatening arrhythmia.  The most serious consequences of atrial fibrillation including stroke and worsening of overall cardiac function.  While in atrial fibrillation, the upper cardiac chambers do not contract normally, resulting in slower blood flow and increased risk of clot formation.  If this blood clot becomes detached from the heart a stroke can occur.  Unfortunately, stroke can be the first sign of atrial fibrillation for some people.  With a stroke, you may notice abnormal sensation, weakness on one side of the body or face, changes in your vision or speech.  If you have any of these signs, you should contact EMS and be evaluated in an emergency room as soon as possible.      How is atrial fibrillation treated?     Several treatment options exist for suppressing atrial fibrillation - however, it is not an easily curable arrhythmia.  The first goal in managing atrial fibrillation is to minimize stroke risk.  The second goal is to improve symptoms associated with atrial fibrillation.  Finally, in patients with reduced cardiac function, maintaining normal rhythm can help improve cardiac function.      Blood thinners are used to reduce the risk of stroke in patients with high estimated stroke risk related to atrial fibrillation.  For patients at higher risk of bleeding related to blood thinner use, implantable devices may be an option to reduce stroke risk without the need for long term blood thinner use.       Atrial fibrillation can be managed via two strategies: rate control and rhythm control.  In a rate control strategy, continued atrial fibrillation is accepted and medications (eg. beta-blockers or calcium channel blockers) are used to control the lower chamber rate.  In a rhythm control strategy, anti-arrhythmic medications or catheter ablation are used to maintain normal cardiac rhythm and slow disease progression by suppressing atrial fibrillation.  A procedure called a cardioversion, in which an electric shock is delivered through patches placed on the chest wall while under deep sedation, can be performed to temporarily restore normal cardiac rhythm, though does not address the chance of atrial fibrillation recurrence.  Treatments are more effective for earlier rather than later stage atrial fibrillation.  Lifestyle modifications (maintaining a healthy weight, aerobic exercise, diagnosing and treating sleep apnea, and minimizing alcohol intake) are important elements of atrial fibrillation rhythm control.     What is catheter ablation for atrial fibrillation?  Cardiac catheter ablation is a commonly performed, minimally invasive procedure performed by a cardiac electrophysiologist to treat many different cardiac rhythm abnormalities.  During catheter ablation, long, thin, flexible tubes are advanced into the heart via small sheaths inserted into the femoral veins and thermal energy (either heating or cooling) is applied within the heart to disrupt abnormal electrical activity.  Atrial fibrillation ablation is performed under general anesthesia, with procedures generally taking approximately 2-3 hours.  Patients are typically observed for 3-5 hours after the ablation, and in most cases can be discharged home the same day.  Atrial fibrillation ablation is associated with better outcomes (mortality, cardiovascular hospitalizations, atrial arrhythmia recurrences) compared to antiarrhythmic drug therapy.   However, atrial fibrillation recurrences are not uncommon, and repeat catheter ablation may be offered.  Your electrophysiology team can review atrial fibrillation ablation, anticipated success rates, risks, and recovery expectations with you.    What are anti-arrhythmic medications?  Anti-arrhythmic medications are specialized drugs which alter cardiac electrical functioning to suppress arrhythmias.  There are several anti-arrhythmic medications available, each with its own success rate and side effects.  Some anti-arrhythmic medications are less effective though safer to use, others are more effective though have serious potential toxicities.  Atrial fibrillation recurrences are common and may require dose adjustment or change in antiarrhythmic therapy.  Your electrophysiology team will carefully consider which medication would be the best and safest for your particular case.      Can I live a normal life?    The goal of atrial fibrillation management is for patients to live normal lives without being limited by symptoms related to atrial fibrillation.    Are any additional educational resources available?  There are a number of excellent atrial fibrillation education resources available to you online.  A few options you may wish to review include:  hrsonline.org/guide-atrial-fibrillation  afibmatters.org  getsmartaboutafib.com  stopaf.com    What comes next?    Consider your management options and let us know how we can help in your decision process.  Please take medications as they have been prescribed.  You should also get any tests that may have been ordered for you.      When to Call Your Doctor or seek emergency care:  Call your doctor or seek emergency care if you have any significant changes with the following:  Weakness  Dizziness  Fainting  Fatigue  Shortness of breath  Chest pain with increased activity  If you are concerned that your heart rate is too fast or too slow  Bleeding that does not stop in 10  minutes  Coughing or throwing up blood  Bloody diarrhea or bleeding hemorrhoids  Dark-colored urine or black stool  Allergic reactions:  Rash  Itching  Swelling  Trouble breathing or swallowing      Please call the [facility] at [phone] if you have concerns about your symptoms, your medicines, or your follow-up appointments.

## 2025-07-16 LAB
ATRIAL RATE - MUSE: 73 BPM
DIASTOLIC BLOOD PRESSURE - MUSE: NORMAL MMHG
INTERPRETATION ECG - MUSE: NORMAL
P AXIS - MUSE: 70 DEGREES
PR INTERVAL - MUSE: 164 MS
QRS DURATION - MUSE: 102 MS
QT - MUSE: 436 MS
QTC - MUSE: 480 MS
R AXIS - MUSE: 57 DEGREES
SYSTOLIC BLOOD PRESSURE - MUSE: NORMAL MMHG
T AXIS - MUSE: 76 DEGREES
VENTRICULAR RATE- MUSE: 73 BPM

## 2025-07-17 ENCOUNTER — TELEPHONE (OUTPATIENT)
Dept: CARDIOLOGY | Facility: CLINIC | Age: 70
End: 2025-07-17
Payer: COMMERCIAL

## 2025-08-12 ENCOUNTER — LAB REQUISITION (OUTPATIENT)
Dept: LAB | Facility: CLINIC | Age: 70
End: 2025-08-12
Payer: COMMERCIAL

## 2025-08-12 ENCOUNTER — TRANSFERRED RECORDS (OUTPATIENT)
Dept: HEALTH INFORMATION MANAGEMENT | Facility: CLINIC | Age: 70
End: 2025-08-12

## 2025-08-12 DIAGNOSIS — E11.40 TYPE 2 DIABETES MELLITUS WITH DIABETIC NEUROPATHY, UNSPECIFIED (H): ICD-10-CM

## 2025-08-12 LAB
ANION GAP SERPL CALCULATED.3IONS-SCNC: 19 MMOL/L (ref 7–15)
BUN SERPL-MCNC: 14.9 MG/DL (ref 8–23)
CALCIUM SERPL-MCNC: 9.5 MG/DL (ref 8.8–10.4)
CHLORIDE SERPL-SCNC: 97 MMOL/L (ref 98–107)
CREAT SERPL-MCNC: 0.75 MG/DL (ref 0.67–1.17)
CREAT UR-MCNC: 122 MG/DL
EGFRCR SERPLBLD CKD-EPI 2021: >90 ML/MIN/1.73M2
GLUCOSE SERPL-MCNC: 113 MG/DL (ref 70–99)
HCO3 SERPL-SCNC: 24 MMOL/L (ref 22–29)
MICROALBUMIN UR-MCNC: <12 MG/L
MICROALBUMIN/CREAT UR: NORMAL MG/G{CREAT}
POTASSIUM SERPL-SCNC: 3.9 MMOL/L (ref 3.4–5.3)
SODIUM SERPL-SCNC: 140 MMOL/L (ref 135–145)

## 2025-08-18 ENCOUNTER — OFFICE VISIT (OUTPATIENT)
Dept: CARDIOLOGY | Facility: CLINIC | Age: 70
End: 2025-08-18
Payer: COMMERCIAL

## 2025-08-18 VITALS
HEART RATE: 68 BPM | RESPIRATION RATE: 16 BRPM | BODY MASS INDEX: 28.3 KG/M2 | DIASTOLIC BLOOD PRESSURE: 76 MMHG | SYSTOLIC BLOOD PRESSURE: 128 MMHG | WEIGHT: 197.2 LBS

## 2025-08-18 DIAGNOSIS — I25.10 ATHEROSCLEROSIS OF NATIVE CORONARY ARTERY OF NATIVE HEART WITHOUT ANGINA PECTORIS: ICD-10-CM

## 2025-08-18 DIAGNOSIS — I48.19 PERSISTENT ATRIAL FIBRILLATION (H): ICD-10-CM

## 2025-08-18 DIAGNOSIS — R60.0 LOCALIZED EDEMA: ICD-10-CM

## 2025-08-18 DIAGNOSIS — E78.2 MIXED HYPERLIPIDEMIA: ICD-10-CM

## 2025-08-18 DIAGNOSIS — I10 BENIGN ESSENTIAL HYPERTENSION: ICD-10-CM

## 2025-08-18 DIAGNOSIS — I48.0 PAF (PAROXYSMAL ATRIAL FIBRILLATION) (H): ICD-10-CM

## 2025-08-18 DIAGNOSIS — Z98.890 S/P CAROTID ENDARTERECTOMY: ICD-10-CM

## 2025-08-18 DIAGNOSIS — Z79.01 CHRONIC ANTICOAGULATION: Primary | ICD-10-CM

## 2025-08-18 DIAGNOSIS — R01.1 HEART MURMUR: ICD-10-CM

## 2025-08-18 PROCEDURE — 99214 OFFICE O/P EST MOD 30 MIN: CPT | Performed by: INTERNAL MEDICINE

## 2025-08-18 PROCEDURE — 3074F SYST BP LT 130 MM HG: CPT | Performed by: INTERNAL MEDICINE

## 2025-08-18 PROCEDURE — 3078F DIAST BP <80 MM HG: CPT | Performed by: INTERNAL MEDICINE

## 2025-08-18 PROCEDURE — G2211 COMPLEX E/M VISIT ADD ON: HCPCS | Performed by: INTERNAL MEDICINE

## 2025-08-18 RX ORDER — NITROGLYCERIN 0.4 MG/1
0.4 TABLET SUBLINGUAL EVERY 5 MIN PRN
COMMUNITY
Start: 2025-08-12

## 2025-08-18 RX ORDER — MULTIPLE VITAMINS W/ MINERALS TAB 9MG-400MCG
1 TAB ORAL DAILY
COMMUNITY

## (undated) DEVICE — SU PROLENE 5-0 RB-1DA 36"  8556H

## (undated) DEVICE — SUCTION MANIFOLD NEPTUNE 2 SYS 1 PORT 702-025-000

## (undated) DEVICE — SPECIMEN CONTAINER 5OZ STERILE 2600SA

## (undated) DEVICE — SOL WATER IRRIG 500ML BOTTLE 2F7113

## (undated) DEVICE — SYR 10ML LL W/O NDL

## (undated) DEVICE — SOL WATER IRRIG 1000ML BOTTLE 2F7114

## (undated) DEVICE — TRAY EPIDURAL NDL TUOHY PERIFIX 18GA 3.5" W/CATH 332200

## (undated) DEVICE — SPONGE SURGIFOAM 12 1972

## (undated) DEVICE — LINEN TOWEL PACK X5 5464

## (undated) DEVICE — GLOVE PROTEXIS POWDER FREE SMT 6.5  2D72PT65X

## (undated) DEVICE — DECANTER BAG 2002S

## (undated) DEVICE — SUCTION CANISTER MEDIVAC LINER 3000ML W/LID 65651-530

## (undated) DEVICE — IOM SUPPLIES

## (undated) DEVICE — SOL NACL 0.9% INJ 250ML BAG 2B1322Q

## (undated) DEVICE — GLOVE PROTEXIS W/NEU-THERA 8.0  2D73TE80

## (undated) DEVICE — DRAIN JACKSON PRATT 15FR ROUND SU130-1323

## (undated) DEVICE — ESU GROUND PAD UNIVERSAL W/O CORD

## (undated) DEVICE — SU SILK 3-0 TIE 24" SA74H

## (undated) DEVICE — SYR 03ML LL W/O NDL 309657

## (undated) DEVICE — GLOVE PROTEXIS BLUE W/NEU-THERA 8.0  2D73EB80

## (undated) DEVICE — DRAIN JACKSON PRATT RESERVOIR 100ML SU130-1305

## (undated) DEVICE — BLADE KNIFE BEAVER MINI BEAVER6400

## (undated) DEVICE — SU PROLENE 5-0 RB-2DA 30" 8710H

## (undated) DEVICE — SU PROLENE 6-0 C-1DA 30" 8706H

## (undated) DEVICE — BASIN SET SINGLE STERILE 13752-624

## (undated) DEVICE — SU MONOCRYL 4-0 PS-2 18" UND Y496G

## (undated) DEVICE — PREP CHLORAPREP W/ORANGE TINT 10.5ML 260715

## (undated) DEVICE — JELLY LUBRICATING SURGILUBE 2OZ TUBE

## (undated) DEVICE — SURGICEL HEMOSTAT 3X4" NUKNIT 1943

## (undated) DEVICE — DRSG GAUZE 4X4" TRAY 6939

## (undated) DEVICE — NDL 15GA 1.5" 8881200029

## (undated) DEVICE — SU SILK 2-0 TIE 18' A185H

## (undated) DEVICE — PACK VASCULAR SCV15VAFSB

## (undated) DEVICE — SPONGE COTTONOID 2X1/2" 80-1406

## (undated) DEVICE — IONM UP TO 7 HOURS

## (undated) DEVICE — SUCTION TIP YANKAUER W/O VENT K86

## (undated) DEVICE — TUBING SUCTION 10'X3/16" N510

## (undated) DEVICE — NDL 19GA 1.5"

## (undated) DEVICE — SHUNT SUNDT 3X4X10CM NL850-5060

## (undated) DEVICE — DRSG GAUZE 4X4" 3033

## (undated) DEVICE — CLIP APPLIER 09 3/8" SM LIGACLIP MCS20

## (undated) DEVICE — SOL NACL 0.9% IRRIG 1000ML BOTTLE 2F7124

## (undated) DEVICE — SU PDS II 3-0 SH 27" Z316H

## (undated) DEVICE — SPONGE RAY-TEC 4X8" 7318

## (undated) RX ORDER — LIDOCAINE HYDROCHLORIDE 20 MG/ML
INJECTION, SOLUTION EPIDURAL; INFILTRATION; INTRACAUDAL; PERINEURAL
Status: DISPENSED
Start: 2021-10-09

## (undated) RX ORDER — HEPARIN SODIUM 1000 [USP'U]/ML
INJECTION, SOLUTION INTRAVENOUS; SUBCUTANEOUS
Status: DISPENSED
Start: 2021-10-09

## (undated) RX ORDER — PROPOFOL 10 MG/ML
INJECTION, EMULSION INTRAVENOUS
Status: DISPENSED
Start: 2021-10-09

## (undated) RX ORDER — LIDOCAINE HYDROCHLORIDE 10 MG/ML
INJECTION, SOLUTION EPIDURAL; INFILTRATION; INTRACAUDAL; PERINEURAL
Status: DISPENSED
Start: 2021-10-09

## (undated) RX ORDER — LIDOCAINE HYDROCHLORIDE AND EPINEPHRINE 10; 10 MG/ML; UG/ML
INJECTION, SOLUTION INFILTRATION; PERINEURAL
Status: DISPENSED
Start: 2021-12-09

## (undated) RX ORDER — NEOSTIGMINE METHYLSULFATE 1 MG/ML
VIAL (ML) INJECTION
Status: DISPENSED
Start: 2021-10-09

## (undated) RX ORDER — ONDANSETRON 2 MG/ML
INJECTION INTRAMUSCULAR; INTRAVENOUS
Status: DISPENSED
Start: 2021-10-09

## (undated) RX ORDER — LABETALOL HYDROCHLORIDE 5 MG/ML
INJECTION, SOLUTION INTRAVENOUS
Status: DISPENSED
Start: 2021-10-09

## (undated) RX ORDER — HYDROMORPHONE HYDROCHLORIDE 1 MG/ML
INJECTION, SOLUTION INTRAMUSCULAR; INTRAVENOUS; SUBCUTANEOUS
Status: DISPENSED
Start: 2021-10-09

## (undated) RX ORDER — FENTANYL CITRATE 50 UG/ML
INJECTION, SOLUTION INTRAMUSCULAR; INTRAVENOUS
Status: DISPENSED
Start: 2021-10-09

## (undated) RX ORDER — HYDRALAZINE HYDROCHLORIDE 20 MG/ML
INJECTION INTRAMUSCULAR; INTRAVENOUS
Status: DISPENSED
Start: 2021-10-09

## (undated) RX ORDER — PROTAMINE SULFATE 10 MG/ML
INJECTION, SOLUTION INTRAVENOUS
Status: DISPENSED
Start: 2021-10-09

## (undated) RX ORDER — DEXAMETHASONE SODIUM PHOSPHATE 4 MG/ML
INJECTION, SOLUTION INTRA-ARTICULAR; INTRALESIONAL; INTRAMUSCULAR; INTRAVENOUS; SOFT TISSUE
Status: DISPENSED
Start: 2021-10-09

## (undated) RX ORDER — CEFAZOLIN SODIUM 2 G/100ML
INJECTION, SOLUTION INTRAVENOUS
Status: DISPENSED
Start: 2021-10-09

## (undated) RX ORDER — GLYCOPYRROLATE 0.2 MG/ML
INJECTION, SOLUTION INTRAMUSCULAR; INTRAVENOUS
Status: DISPENSED
Start: 2021-10-09